# Patient Record
Sex: FEMALE | Race: OTHER | NOT HISPANIC OR LATINO | URBAN - METROPOLITAN AREA
[De-identification: names, ages, dates, MRNs, and addresses within clinical notes are randomized per-mention and may not be internally consistent; named-entity substitution may affect disease eponyms.]

---

## 2021-06-06 ENCOUNTER — INPATIENT (INPATIENT)
Facility: HOSPITAL | Age: 71
LOS: 16 days | Discharge: NURSING, REHAB OR LONG-TERM CARE | DRG: 872 | End: 2021-06-23
Attending: INTERNAL MEDICINE | Admitting: INTERNAL MEDICINE
Payer: MEDICARE

## 2021-06-06 VITALS
OXYGEN SATURATION: 97 % | WEIGHT: 293 LBS | HEART RATE: 94 BPM | HEIGHT: 65 IN | RESPIRATION RATE: 20 BRPM | TEMPERATURE: 100 F

## 2021-06-06 LAB
ALBUMIN SERPL ELPH-MCNC: 4.1 G/DL — SIGNIFICANT CHANGE UP (ref 3.3–5)
ALP SERPL-CCNC: 77 U/L — SIGNIFICANT CHANGE UP (ref 40–120)
ALT FLD-CCNC: 15 U/L — SIGNIFICANT CHANGE UP (ref 10–45)
ANION GAP SERPL CALC-SCNC: 13 MMOL/L — SIGNIFICANT CHANGE UP (ref 5–17)
APTT BLD: 31.4 SEC — SIGNIFICANT CHANGE UP (ref 27.5–35.5)
AST SERPL-CCNC: 39 U/L — SIGNIFICANT CHANGE UP (ref 10–40)
BASE EXCESS BLDV CALC-SCNC: 1.4 MMOL/L — SIGNIFICANT CHANGE UP (ref -2–2)
BASOPHILS # BLD AUTO: 0 K/UL — SIGNIFICANT CHANGE UP (ref 0–0.2)
BASOPHILS NFR BLD AUTO: 0 % — SIGNIFICANT CHANGE UP (ref 0–2)
BILIRUB SERPL-MCNC: 0.6 MG/DL — SIGNIFICANT CHANGE UP (ref 0.2–1.2)
BUN SERPL-MCNC: 14 MG/DL — SIGNIFICANT CHANGE UP (ref 7–23)
CA-I SERPL-SCNC: 1.1 MMOL/L — LOW (ref 1.12–1.3)
CALCIUM SERPL-MCNC: 9.4 MG/DL — SIGNIFICANT CHANGE UP (ref 8.4–10.5)
CHLORIDE BLDV-SCNC: 103 MMOL/L — SIGNIFICANT CHANGE UP (ref 96–108)
CHLORIDE SERPL-SCNC: 99 MMOL/L — SIGNIFICANT CHANGE UP (ref 96–108)
CO2 BLDV-SCNC: 27 MMOL/L — SIGNIFICANT CHANGE UP (ref 22–30)
CO2 SERPL-SCNC: 20 MMOL/L — LOW (ref 22–31)
CREAT SERPL-MCNC: 0.65 MG/DL — SIGNIFICANT CHANGE UP (ref 0.5–1.3)
EOSINOPHIL # BLD AUTO: 0 K/UL — SIGNIFICANT CHANGE UP (ref 0–0.5)
EOSINOPHIL NFR BLD AUTO: 0 % — SIGNIFICANT CHANGE UP (ref 0–6)
GAS PNL BLDV: 132 MMOL/L — LOW (ref 135–145)
GAS PNL BLDV: SIGNIFICANT CHANGE UP
GAS PNL BLDV: SIGNIFICANT CHANGE UP
GLUCOSE BLDV-MCNC: 145 MG/DL — HIGH (ref 70–99)
GLUCOSE SERPL-MCNC: 144 MG/DL — HIGH (ref 70–99)
HCO3 BLDV-SCNC: 26 MMOL/L — SIGNIFICANT CHANGE UP (ref 21–29)
HCT VFR BLD CALC: 40.1 % — SIGNIFICANT CHANGE UP (ref 34.5–45)
HCT VFR BLDA CALC: 41 % — SIGNIFICANT CHANGE UP (ref 39–50)
HGB BLD CALC-MCNC: 13.4 G/DL — SIGNIFICANT CHANGE UP (ref 11.5–15.5)
HGB BLD-MCNC: 12.7 G/DL — SIGNIFICANT CHANGE UP (ref 11.5–15.5)
INR BLD: 1.29 RATIO — HIGH (ref 0.88–1.16)
LACTATE BLDV-MCNC: 1.5 MMOL/L — SIGNIFICANT CHANGE UP (ref 0.7–2)
LYMPHOCYTES # BLD AUTO: 0 % — LOW (ref 13–44)
LYMPHOCYTES # BLD AUTO: 0 K/UL — LOW (ref 1–3.3)
MANUAL SMEAR VERIFICATION: SIGNIFICANT CHANGE UP
MCHC RBC-ENTMCNC: 28.6 PG — SIGNIFICANT CHANGE UP (ref 27–34)
MCHC RBC-ENTMCNC: 31.7 GM/DL — LOW (ref 32–36)
MCV RBC AUTO: 90.3 FL — SIGNIFICANT CHANGE UP (ref 80–100)
MONOCYTES # BLD AUTO: 1.83 K/UL — HIGH (ref 0–0.9)
MONOCYTES NFR BLD AUTO: 8.4 % — SIGNIFICANT CHANGE UP (ref 2–14)
NEUTROPHILS # BLD AUTO: 19.44 K/UL — HIGH (ref 1.8–7.4)
NEUTROPHILS NFR BLD AUTO: 89.1 % — HIGH (ref 43–77)
NRBC # BLD: 2 /100 — HIGH (ref 0–0)
PCO2 BLDV: 41 MMHG — SIGNIFICANT CHANGE UP (ref 35–50)
PH BLDV: 7.41 — SIGNIFICANT CHANGE UP (ref 7.35–7.45)
PLAT MORPH BLD: NORMAL — SIGNIFICANT CHANGE UP
PLATELET # BLD AUTO: 216 K/UL — SIGNIFICANT CHANGE UP (ref 150–400)
PO2 BLDV: 50 MMHG — HIGH (ref 25–45)
POTASSIUM BLDV-SCNC: 4.2 MMOL/L — SIGNIFICANT CHANGE UP (ref 3.5–5.3)
POTASSIUM SERPL-MCNC: 6.6 MMOL/L — CRITICAL HIGH (ref 3.5–5.3)
POTASSIUM SERPL-SCNC: 6.6 MMOL/L — CRITICAL HIGH (ref 3.5–5.3)
PROT SERPL-MCNC: 7.9 G/DL — SIGNIFICANT CHANGE UP (ref 6–8.3)
PROTHROM AB SERPL-ACNC: 15.3 SEC — HIGH (ref 10.6–13.6)
RBC # BLD: 4.44 M/UL — SIGNIFICANT CHANGE UP (ref 3.8–5.2)
RBC # FLD: 13.7 % — SIGNIFICANT CHANGE UP (ref 10.3–14.5)
RBC BLD AUTO: NORMAL — SIGNIFICANT CHANGE UP
SAO2 % BLDV: 86 % — SIGNIFICANT CHANGE UP (ref 67–88)
SODIUM SERPL-SCNC: 132 MMOL/L — LOW (ref 135–145)
VARIANT LYMPHS # BLD: 2.5 % — SIGNIFICANT CHANGE UP (ref 0–6)
WBC # BLD: 21.82 K/UL — HIGH (ref 3.8–10.5)
WBC # FLD AUTO: 21.82 K/UL — HIGH (ref 3.8–10.5)

## 2021-06-06 PROCEDURE — 73562 X-RAY EXAM OF KNEE 3: CPT | Mod: 26,LT

## 2021-06-06 PROCEDURE — 99284 EMERGENCY DEPT VISIT MOD MDM: CPT

## 2021-06-06 PROCEDURE — 71045 X-RAY EXAM CHEST 1 VIEW: CPT | Mod: 26

## 2021-06-06 RX ORDER — ACETAMINOPHEN 500 MG
975 TABLET ORAL ONCE
Refills: 0 | Status: COMPLETED | OUTPATIENT
Start: 2021-06-06 | End: 2021-06-06

## 2021-06-06 RX ORDER — SODIUM CHLORIDE 9 MG/ML
500 INJECTION INTRAMUSCULAR; INTRAVENOUS; SUBCUTANEOUS ONCE
Refills: 0 | Status: COMPLETED | OUTPATIENT
Start: 2021-06-06 | End: 2021-06-06

## 2021-06-06 RX ORDER — MORPHINE SULFATE 50 MG/1
4 CAPSULE, EXTENDED RELEASE ORAL ONCE
Refills: 0 | Status: DISCONTINUED | OUTPATIENT
Start: 2021-06-06 | End: 2021-06-06

## 2021-06-06 RX ADMIN — SODIUM CHLORIDE 500 MILLILITER(S): 9 INJECTION INTRAMUSCULAR; INTRAVENOUS; SUBCUTANEOUS at 23:13

## 2021-06-06 RX ADMIN — Medication 975 MILLIGRAM(S): at 23:12

## 2021-06-06 RX ADMIN — MORPHINE SULFATE 4 MILLIGRAM(S): 50 CAPSULE, EXTENDED RELEASE ORAL at 23:28

## 2021-06-06 NOTE — ED ADULT TRIAGE NOTE - BMI (KG/M2)
74.9
Constitutional: No fever, chills, unintended weight loss.  Eyes:  No visual changes, eye pain or discharge.  ENMT:  No hearing changes, pain, no sore throat or runny nose, no difficulty swallowing  Cardiac:  No chest pain, SOB or edema. No chest pain with exertion.  Respiratory:  No cough or respiratory distress. No hemoptysis. No history of asthma or RAD.  GI:  see hpi  : chronic indwelling us  MS:  No myalgia, muscle weakness, joint pain or back pain.  Neuro:  No headache or weakness.  No LOC.  Skin:  No skin rash.   Endocrine: +dm

## 2021-06-06 NOTE — ED PROVIDER NOTE - CLINICAL SUMMARY MEDICAL DECISION MAKING FREE TEXT BOX
70yo female lymphedema p/w 2 days L knee pain and fever. no warmth, TTP or focal swelling of L knee. Febrile here without other symptoms to suggest focal source, skin rash of LE's unchanged from baseline. Unlikely septic joint. Low suspicion for cellulitis. Possible viral syndrome such as covid. Knee pain possible 2/2 fx. XR's, labs, cultures, fluids and reassess. If unable to ambulate will need admission.

## 2021-06-06 NOTE — ED PROVIDER NOTE - PROGRESS NOTE DETAILS
Rosalina PGY2: XR reviewed, concern for distal femur fx and possible dislocation, strong dopplerable DP pulses of L foot, ortho paged Rosalina PGY2: discussed with radiology and ortho, XR findings consistent with old distal femur fx with OA and chronic remodeling. Will admit for inability to ambulate

## 2021-06-06 NOTE — ED PROVIDER NOTE - OBJECTIVE STATEMENT
72yo female b/l lymphedema, morbid obesity, HTN, hypothyrodism, ambulates with walker p/w 2 days L knee pain preventing her from walking today. Noted fever last night that resolved with tylenol. 1st dose moderna on 5/26, awaiting 2nd dose. Denies chills, abdominal pain, N/V/D, skin changes from baseline, cough, sick contacts, trauma/falls, prior knee surgeries, h/o DVT/PE.

## 2021-06-06 NOTE — ED PROVIDER NOTE - PHYSICAL EXAMINATION
Physical Exam:  Gen: NAD, AOx3, non-toxic appearing  Head: NCAT  HEENT: EOMI, PEERLA, normal conjunctiva, tongue midline, oral mucosa moist  Lung: CTAB, no respiratory distress, no wheezes/rhonchi/rales B/L, speaking in full sentences  CV: RRR, no murmurs, rubs or gallops, distal pulses 2+ b/l  Abd: soft, NT, ND, no guarding, no rigidity, no rebound tenderness, no CVA tenderness   MSK: no visible deformities, no warmth or erythema around L knee. no back TTP  Skin: Warm, well perfused, mild LE rash around ankles and shin's b/l (chronic per patient), significantly lymphedema b/l  Psych: normal affect, calm Physical Exam:  Gen: NAD, AOx3, non-toxic appearing  Head: NCAT  HEENT: EOMI, PEERLA, normal conjunctiva, tongue midline, oral mucosa moist  Lung: CTAB, no respiratory distress, no wheezes/rhonchi/rales B/L, speaking in full sentences  CV: RRR, no murmurs, rubs or gallops, distal pulses 2+ b/l  Abd: soft, NT, ND, no guarding, no rigidity, no rebound tenderness, no CVA tenderness   MSK: no visible deformities, no warmth, TTP or erythema around L knee, no hip TTP, no back TTP  Skin: Warm, well perfused, mild LE rash around ankles and shin's b/l (chronic per patient), significantly lymphedema b/l  Psych: normal affect, calm

## 2021-06-06 NOTE — ED ADULT TRIAGE NOTE - CHIEF COMPLAINT QUOTE
left leg pain, recent hospitalization of cellulitis to b/l legs  unable to obtain BP at triage d/t pt being in rigid moving sled, unable to remove arm from sled on EMS stretcher safely.

## 2021-06-06 NOTE — ED PROVIDER NOTE - ATTENDING CONTRIBUTION TO CARE
72yo female lymphedema p/w 2 days L knee pain and fever. no warmth, TTP or focal swelling of L knee, exam very limited by morbid obesity, will check labs, imaging, likely tba as patient will not be able to manage in outpatient setting. HILTON Mcintyre

## 2021-06-07 DIAGNOSIS — M25.562 PAIN IN LEFT KNEE: ICD-10-CM

## 2021-06-07 LAB
ANION GAP SERPL CALC-SCNC: 13 MMOL/L — SIGNIFICANT CHANGE UP (ref 5–17)
ANION GAP SERPL CALC-SCNC: 14 MMOL/L — SIGNIFICANT CHANGE UP (ref 5–17)
APPEARANCE UR: CLEAR — SIGNIFICANT CHANGE UP
APTT BLD: 33.5 SEC — SIGNIFICANT CHANGE UP (ref 27.5–35.5)
BACTERIA # UR AUTO: NEGATIVE — SIGNIFICANT CHANGE UP
BILIRUB UR-MCNC: NEGATIVE — SIGNIFICANT CHANGE UP
BLD GP AB SCN SERPL QL: NEGATIVE — SIGNIFICANT CHANGE UP
BLD GP AB SCN SERPL QL: NEGATIVE — SIGNIFICANT CHANGE UP
BUN SERPL-MCNC: 11 MG/DL — SIGNIFICANT CHANGE UP (ref 7–23)
BUN SERPL-MCNC: 13 MG/DL — SIGNIFICANT CHANGE UP (ref 7–23)
CALCIUM SERPL-MCNC: 9.2 MG/DL — SIGNIFICANT CHANGE UP (ref 8.4–10.5)
CALCIUM SERPL-MCNC: 9.2 MG/DL — SIGNIFICANT CHANGE UP (ref 8.4–10.5)
CHLORIDE SERPL-SCNC: 101 MMOL/L — SIGNIFICANT CHANGE UP (ref 96–108)
CHLORIDE SERPL-SCNC: 97 MMOL/L — SIGNIFICANT CHANGE UP (ref 96–108)
CK SERPL-CCNC: 26 U/L — SIGNIFICANT CHANGE UP (ref 25–170)
CO2 SERPL-SCNC: 22 MMOL/L — SIGNIFICANT CHANGE UP (ref 22–31)
CO2 SERPL-SCNC: 23 MMOL/L — SIGNIFICANT CHANGE UP (ref 22–31)
COLOR SPEC: SIGNIFICANT CHANGE UP
CREAT SERPL-MCNC: 0.65 MG/DL — SIGNIFICANT CHANGE UP (ref 0.5–1.3)
CREAT SERPL-MCNC: 0.72 MG/DL — SIGNIFICANT CHANGE UP (ref 0.5–1.3)
CRP SERPL-MCNC: 161 MG/L — HIGH (ref 0–4)
DIFF PNL FLD: NEGATIVE — SIGNIFICANT CHANGE UP
EPI CELLS # UR: 1 /HPF — SIGNIFICANT CHANGE UP
ERYTHROCYTE [SEDIMENTATION RATE] IN BLOOD: 61 MM/HR — HIGH (ref 0–20)
GLUCOSE SERPL-MCNC: 113 MG/DL — HIGH (ref 70–99)
GLUCOSE SERPL-MCNC: 141 MG/DL — HIGH (ref 70–99)
GLUCOSE UR QL: NEGATIVE — SIGNIFICANT CHANGE UP
GRAM STN FLD: SIGNIFICANT CHANGE UP
HCT VFR BLD CALC: 38.7 % — SIGNIFICANT CHANGE UP (ref 34.5–45)
HGB BLD-MCNC: 12.3 G/DL — SIGNIFICANT CHANGE UP (ref 11.5–15.5)
HYALINE CASTS # UR AUTO: 1 /LPF — SIGNIFICANT CHANGE UP (ref 0–2)
INR BLD: 1.32 RATIO — HIGH (ref 0.88–1.16)
KETONES UR-MCNC: ABNORMAL
LEUKOCYTE ESTERASE UR-ACNC: ABNORMAL
MCHC RBC-ENTMCNC: 29.1 PG — SIGNIFICANT CHANGE UP (ref 27–34)
MCHC RBC-ENTMCNC: 31.8 GM/DL — LOW (ref 32–36)
MCV RBC AUTO: 91.7 FL — SIGNIFICANT CHANGE UP (ref 80–100)
METHOD TYPE: SIGNIFICANT CHANGE UP
MSSA DNA SPEC QL NAA+PROBE: SIGNIFICANT CHANGE UP
NITRITE UR-MCNC: NEGATIVE — SIGNIFICANT CHANGE UP
NRBC # BLD: 0 /100 WBCS — SIGNIFICANT CHANGE UP (ref 0–0)
PH UR: 6.5 — SIGNIFICANT CHANGE UP (ref 5–8)
PLATELET # BLD AUTO: 211 K/UL — SIGNIFICANT CHANGE UP (ref 150–400)
POTASSIUM SERPL-MCNC: 3.8 MMOL/L — SIGNIFICANT CHANGE UP (ref 3.5–5.3)
POTASSIUM SERPL-MCNC: 4.5 MMOL/L — SIGNIFICANT CHANGE UP (ref 3.5–5.3)
POTASSIUM SERPL-SCNC: 3.8 MMOL/L — SIGNIFICANT CHANGE UP (ref 3.5–5.3)
POTASSIUM SERPL-SCNC: 4.5 MMOL/L — SIGNIFICANT CHANGE UP (ref 3.5–5.3)
PROT UR-MCNC: SIGNIFICANT CHANGE UP
PROTHROM AB SERPL-ACNC: 15.6 SEC — HIGH (ref 10.6–13.6)
RAPID RVP RESULT: SIGNIFICANT CHANGE UP
RBC # BLD: 4.22 M/UL — SIGNIFICANT CHANGE UP (ref 3.8–5.2)
RBC # FLD: 13.9 % — SIGNIFICANT CHANGE UP (ref 10.3–14.5)
RBC CASTS # UR COMP ASSIST: 4 /HPF — SIGNIFICANT CHANGE UP (ref 0–4)
RH IG SCN BLD-IMP: POSITIVE — SIGNIFICANT CHANGE UP
RH IG SCN BLD-IMP: POSITIVE — SIGNIFICANT CHANGE UP
SARS-COV-2 RNA SPEC QL NAA+PROBE: SIGNIFICANT CHANGE UP
SARS-COV-2 RNA SPEC QL NAA+PROBE: SIGNIFICANT CHANGE UP
SODIUM SERPL-SCNC: 134 MMOL/L — LOW (ref 135–145)
SODIUM SERPL-SCNC: 136 MMOL/L — SIGNIFICANT CHANGE UP (ref 135–145)
SP GR SPEC: 1.01 — SIGNIFICANT CHANGE UP (ref 1.01–1.02)
SPECIMEN SOURCE: SIGNIFICANT CHANGE UP
SPECIMEN SOURCE: SIGNIFICANT CHANGE UP
UROBILINOGEN FLD QL: NEGATIVE — SIGNIFICANT CHANGE UP
WBC # BLD: 22.58 K/UL — HIGH (ref 3.8–10.5)
WBC # FLD AUTO: 22.58 K/UL — HIGH (ref 3.8–10.5)
WBC UR QL: 3 /HPF — SIGNIFICANT CHANGE UP (ref 0–5)

## 2021-06-07 RX ORDER — MORPHINE SULFATE 50 MG/1
4 CAPSULE, EXTENDED RELEASE ORAL ONCE
Refills: 0 | Status: DISCONTINUED | OUTPATIENT
Start: 2021-06-07 | End: 2021-06-07

## 2021-06-07 RX ORDER — ACETAMINOPHEN 500 MG
1000 TABLET ORAL ONCE
Refills: 0 | Status: COMPLETED | OUTPATIENT
Start: 2021-06-07 | End: 2021-06-07

## 2021-06-07 RX ORDER — MEROPENEM 1 G/30ML
1000 INJECTION INTRAVENOUS EVERY 8 HOURS
Refills: 0 | Status: DISCONTINUED | OUTPATIENT
Start: 2021-06-07 | End: 2021-06-08

## 2021-06-07 RX ORDER — VANCOMYCIN HCL 1 G
1000 VIAL (EA) INTRAVENOUS ONCE
Refills: 0 | Status: COMPLETED | OUTPATIENT
Start: 2021-06-07 | End: 2021-06-07

## 2021-06-07 RX ORDER — SODIUM CHLORIDE 9 MG/ML
1000 INJECTION, SOLUTION INTRAVENOUS ONCE
Refills: 0 | Status: COMPLETED | OUTPATIENT
Start: 2021-06-07 | End: 2021-06-07

## 2021-06-07 RX ORDER — MORPHINE SULFATE 50 MG/1
2 CAPSULE, EXTENDED RELEASE ORAL ONCE
Refills: 0 | Status: DISCONTINUED | OUTPATIENT
Start: 2021-06-07 | End: 2021-06-07

## 2021-06-07 RX ORDER — ACETAMINOPHEN 500 MG
650 TABLET ORAL EVERY 6 HOURS
Refills: 0 | Status: DISCONTINUED | OUTPATIENT
Start: 2021-06-07 | End: 2021-06-23

## 2021-06-07 RX ADMIN — MEROPENEM 100 MILLIGRAM(S): 1 INJECTION INTRAVENOUS at 09:00

## 2021-06-07 RX ADMIN — MORPHINE SULFATE 4 MILLIGRAM(S): 50 CAPSULE, EXTENDED RELEASE ORAL at 16:39

## 2021-06-07 RX ADMIN — Medication 975 MILLIGRAM(S): at 05:20

## 2021-06-07 RX ADMIN — MORPHINE SULFATE 4 MILLIGRAM(S): 50 CAPSULE, EXTENDED RELEASE ORAL at 03:20

## 2021-06-07 RX ADMIN — MORPHINE SULFATE 4 MILLIGRAM(S): 50 CAPSULE, EXTENDED RELEASE ORAL at 19:33

## 2021-06-07 RX ADMIN — Medication 400 MILLIGRAM(S): at 05:38

## 2021-06-07 RX ADMIN — MORPHINE SULFATE 4 MILLIGRAM(S): 50 CAPSULE, EXTENDED RELEASE ORAL at 12:45

## 2021-06-07 RX ADMIN — SODIUM CHLORIDE 500 MILLILITER(S): 9 INJECTION INTRAMUSCULAR; INTRAVENOUS; SUBCUTANEOUS at 00:00

## 2021-06-07 RX ADMIN — Medication 250 MILLIGRAM(S): at 03:20

## 2021-06-07 RX ADMIN — MORPHINE SULFATE 4 MILLIGRAM(S): 50 CAPSULE, EXTENDED RELEASE ORAL at 12:28

## 2021-06-07 RX ADMIN — MORPHINE SULFATE 2 MILLIGRAM(S): 50 CAPSULE, EXTENDED RELEASE ORAL at 09:00

## 2021-06-07 RX ADMIN — MORPHINE SULFATE 4 MILLIGRAM(S): 50 CAPSULE, EXTENDED RELEASE ORAL at 05:20

## 2021-06-07 RX ADMIN — Medication 400 MILLIGRAM(S): at 18:38

## 2021-06-07 RX ADMIN — MEROPENEM 100 MILLIGRAM(S): 1 INJECTION INTRAVENOUS at 01:51

## 2021-06-07 RX ADMIN — MORPHINE SULFATE 2 MILLIGRAM(S): 50 CAPSULE, EXTENDED RELEASE ORAL at 09:15

## 2021-06-07 NOTE — CONSULT NOTE ADULT - SUBJECTIVE AND OBJECTIVE BOX
----------------------------------------------------------  Interventional Radiology Brief Consult Note  -----------------------------------------------------------    Reason for Referral:   Left knee aspiration     Clinical Summary: 71y Female with pmh of morbid obesity, chronic left distal femur fracture managed nonoperatively from many years ago, now presents with acute onset of atraumatic left knee pain. Subjective fevers at home. Unable to ambulate, although minimal ambulator at baseline. Elevated white count upon admission without clear etiology, orthopedics consulted for concerns of a septic joint. Unable to do bedside aspiration due to body habitus. IR is consulted for left knee aspiration.     Vitals:  T(C): 37.1 (06-07-21 @ 08:20), Max: 38.3 (06-07-21 @ 07:11)  HR: 87 (06-07-21 @ 08:20) (76 - 108)  BP: 123/57 (06-07-21 @ 08:20) (92/75 - 159/70)  RR: 20 (06-07-21 @ 08:20) (14 - 20)  SpO2: 95% (06-07-21 @ 08:20) (95% - 100%)    Labs:           12.7  21.82)-----(216     (06-06-21 @ 22:58)         40.1     136 | 101 | 13  --------------------< 141     (06-07-21 @ 01:33)  4.5 | 22 | 0.72       PT: 15.3<H> 06-06-21 @ 23:42  aPTT: 31.4 06-06-21 @ 23:42   INR: 1.29<H> 06-06-21 @ 23:42      Assessment: 71y Female with left knee pain. pending MRI. IR is consulted for left knee aspiration.     Recommendations:  - IR to follow MRI results. Potentially plan for procedure tomorrow pending MRI results from today. May put IR procedure order under Dr. Allred.   - NPO at midnight. Morning coags, cbc and bmp. Hold anticoagulation.   - Please call IR back with MRI results.        ----------------------------------------------------------  Interventional Radiology Brief Consult Note  -----------------------------------------------------------    Reason for Referral:   Left knee aspiration     Clinical Summary: 71y Female with pmh of morbid obesity, chronic left distal femur fracture managed nonoperatively from many years ago, now presents with acute onset of atraumatic left knee pain. Subjective fevers at home. Unable to ambulate, although minimal ambulator at baseline. Elevated white count upon admission without clear etiology, orthopedics consulted for concerns of a septic joint. Unable to do bedside aspiration due to body habitus. IR is consulted for left knee aspiration.     Vitals:  T(C): 37.1 (06-07-21 @ 08:20), Max: 38.3 (06-07-21 @ 07:11)  HR: 87 (06-07-21 @ 08:20) (76 - 108)  BP: 123/57 (06-07-21 @ 08:20) (92/75 - 159/70)  RR: 20 (06-07-21 @ 08:20) (14 - 20)  SpO2: 95% (06-07-21 @ 08:20) (95% - 100%)    Labs:           12.7  21.82)-----(216     (06-06-21 @ 22:58)         40.1     136 | 101 | 13  --------------------< 141     (06-07-21 @ 01:33)  4.5 | 22 | 0.72       PT: 15.3<H> 06-06-21 @ 23:42  aPTT: 31.4 06-06-21 @ 23:42   INR: 1.29<H> 06-06-21 @ 23:42      Assessment: 71y Female with left knee pain. pending MRI. IR is consulted for left knee aspiration.     Recommendations:  - IR to follow MRI results. Potentially plan for procedure tomorrow pending MRI results from today. May put IR procedure order under Dr. Allred.   - NPO at midnight. Morning coags, cbc and bmp. Hold anticoagulation.   - Please call IR back with MRI results.

## 2021-06-07 NOTE — CONSULT NOTE ADULT - SUBJECTIVE AND OBJECTIVE BOX
71yFemale, morbid obesity, chronic left distal femur fracture managed nonoperatively from many years ago, now presents with acute onset of atraumatic left knee pain. Subjective fevers at home. Unable to ambulate, although minimal ambulator at baseline. Elevated white count upon admission without clear etiology, orthopedics consulted for concerns of a septic joint. Patient denies falls/trauma. Denies numbness/tingling/weakness. No other orthopedic concerns.    PAST MEDICAL & SURGICAL HISTORY:  Lymphedema    HTN (hypertension)    Hypothyroidism    Morbid obesity    No significant past surgical history      Home Medications:    Allergies    No Known Allergies    Intolerances      Vital Signs Last 24 Hrs  T(C): 37.1 (07 Jun 2021 04:47), Max: 38 (06 Jun 2021 22:23)  T(F): 98.7 (07 Jun 2021 04:47), Max: 100.4 (06 Jun 2021 22:23)  HR: 76 (07 Jun 2021 06:00) (76 - 108)  BP: 111/40 (07 Jun 2021 06:00) (92/75 - 159/70)  BP(mean): 54 (07 Jun 2021 06:00) (54 - 98)  RR: 20 (07 Jun 2021 06:00) (16 - 20)  SpO2: 96% (07 Jun 2021 06:00) (95% - 100%)    PE  Gen: NAD, resting comfortably, morbid obesity  LLE:  Exam limited by body habitus, difficult to appreciate any bony anatomy  Skin intact, no erythema or drainage, lower extremity cellulitic changes  TTP over knee  ROM limited by body habitus, poor ROM at baseline, pain with micromotion  +TA/EHL/FHL/GS  SILT L2-S1  + pulses  Compartments soft and compressible      Radiology  XR L Knee: Chronic L DF Fracture, poorly visualized anatomy 2/2 deformity, Severe OA    A/P 71yFemale with atraumatic left knee pain concern for septic joint    - Medical admission  - Difficult examination 2' body habitus, however given elevated ESR/CRP, negative remaining w/u and severe knee pain compared c/l, would have moderate concern for infection  - Needs an MRI of the knee to evaluate for an effusion  - Would recommend IR aspiration pending effusion, body habitus to large for bedside aspiration. Please send cell count, crystals, gram stain, culture  - Would ideally hold abx for possible IR aspiration  - Multimodal analgesia  - WBAT  - PT/OT  - Care per primary team  - Manny attending

## 2021-06-07 NOTE — ED ADULT NURSE REASSESSMENT NOTE - NS ED NURSE REASSESS COMMENT FT1
BETTINA Gleason called at 0530 as pt requesting pain medications. NP ordered Ofirmev for the pt. Pt repositioned in bed and has no complaints at this time. Pt updated on POC and is awaiting a bed upstairs. Pt comfort measures in place, call bell is at bedside, and pt safety maintained.

## 2021-06-07 NOTE — ED ADULT NURSE NOTE - OBJECTIVE STATEMENT
Pt is a 71y Female c/o L knee pain and inability to ambulate x 2 days. Pt sent in by PMD due to pt inability to ambulate in her house with walker at home. Pt is a 71y Female c/o L knee pain and inability to ambulate with her walker x 2 days. Pt PMHx Lymphedema, Morbid Obesity, HTN, Hypothyroidism. Pt sent in by PMD due to pt inability to ambulate in her house with walker at home. Pt states she has 2x 24/7 aides at home who assist her with daily living. Pt states that 2 days ago she was going to he bathroom and felt a pop in her L knee and has since been unable to walk. Pt states her pain has not been well managed at home with Tylenol. Pt also noted a fever last night. Pt denies CP, NVD, abdominal pain, sick contacts, falls, trauma to site, SOB. Pt was brought in on 2L NC but pt O2 sat 95% upon arrival on RA. Pt transferred to hospital bariatric bed with transport mat and placed on AirTap. Pt prefers to go by Shantelle and uses She/Her/Hers pronouns. Pt resting comfortably in bed with MD, EMS, RN at bedside. Pt educated on call bell use and call bell placed at bedside. Pt safety maintained.

## 2021-06-07 NOTE — H&P ADULT - HISTORY OF PRESENT ILLNESS
70yo female b/l lymphedema, morbid obesity, HTN, hypothyrodism, ambulates with walker p/w 2 days L knee pain preventing her from walking today. 72yo female b/l lymphedema, morbid obesity, HTN, hypothyrodism, ambulates with walker p/w 2 days L knee pain preventing her from walking today.  No hx trauma/  fall/ fevers.

## 2021-06-07 NOTE — ED ADULT NURSE REASSESSMENT NOTE - NS ED NURSE REASSESS COMMENT FT1
Received report from Deondre BRITTON. pt. A&Ox3, sitting up in stretcher at this time, pt. does not appear to be in any acute distress - nonlabored breathing noted. Pt. repositioned for comfort. Pt. oral temp of 101 at this time, as per night shift RN pt. received IV Tylenol at 0538 and MD is aware, pt is being admitted for fever and l knee pain. Safety and comfort provided.

## 2021-06-07 NOTE — ED ADULT NURSE NOTE - INTERVENTIONS DEFINITIONS
[place] to call system/Call bell, personal items and telephone within reach/Instruct patient to call for assistance/Physically safe environment: no spills, clutter or unnecessary equipment/Provide visual cue, wrist band, yellow gown, etc./Reinforce activity limits and safety measures with patient and family/Provide visual clues: red socks

## 2021-06-07 NOTE — H&P ADULT - ASSESSMENT
70yo female b/l lymphedema, morbid obesity, HTN, hypothyrodism, ambulates with walker p/w 2 days L knee pain     Left knee pain- Reviewed imaging. Ortho consult appreciated.   Recommend MRI Left knee r/o septic arthritis     Hypothyroidism Continue with Synthroid     HTN

## 2021-06-07 NOTE — CHART NOTE - NSCHARTNOTEFT_GEN_A_CORE
Medicine Progress Note    cc fever and + BC    Patient is a 71y old  Female who presents with a chief complaint of Left knee pain today (2021 11:01) concerning for septic joint  arrived from ED with T102 and + BC gram positive cocci      SUBJECTIVE / OVERNIGHT EVENTS:    ADDITIONAL REVIEW OF SYSTEMS:    MEDICATIONS  (STANDING):  meropenem  IVPB 1000 milliGRAM(s) IV Intermittent every 8 hours  vancomycin  IVPB 1000 milliGRAM(s) IV Intermittent once    MEDICATIONS  (PRN):  acetaminophen   Tablet .. 650 milliGRAM(s) Oral every 6 hours PRN Temp greater or equal to 38C (100.4F)    CAPILLARY BLOOD GLUCOSE        I&O's Summary      PHYSICAL EXAM:  Vital Signs Last 24 Hrs  T(C): 39.2 (2021 17:08), Max: 39.2 (2021 17:08)  T(F): 102.5 (2021 17:08), Max: 102.5 (2021 17:08)  HR: 92 (2021 17:08) (76 - 108)  BP: 117/65 (2021 17:08) (92/75 - 165/85)  BP(mean): 74 (2021 07:11) (54 - 98)  RR: 18 (2021 17:08) (14 - 20)  SpO2: 96% (2021 17:08) (95% - 100%)  CONSTITUTIONAL:   Morbid obesity BMI 74.9  RESPIRATORY: decreased BS, no rales  CARDIOVASCULAR: Regular rate and rhythm,  ABDOMEN: Obese, nontender   Ext- Bilateral lymphedema  PSYCH: A+O to person, place, and time; affect appropriate  SKIN: No rashes; no palpable lesions, no erythema    LABS:                        12.3   22.58 )-----------( 211      ( 2021 11:16 )             38.7     06-07    134<L>  |  97  |  11  ----------------------------<  113<H>  3.8   |  23  |  0.65    Ca    9.2      2021 11:16    TPro  7.9  /  Alb  4.1  /  TBili  0.6  /  DBili  x   /  AST  39  /  ALT  15  /  AlkPhos  77  06-06    PT/INR - ( 2021 11:16 )   PT: 15.6 sec;   INR: 1.32 ratio         PTT - ( 2021 11:16 )  PTT:33.5 sec  CARDIAC MARKERS ( 2021 23:57 )  x     / x     / 26 U/L / x     / x          Urinalysis Basic - ( 2021 01:33 )    Color: Light Yellow / Appearance: Clear / S.012 / pH: x  Gluc: x / Ketone: Small  / Bili: Negative / Urobili: Negative   Blood: x / Protein: Trace / Nitrite: Negative   Leuk Esterase: Small / RBC: 4 /hpf / WBC 3 /HPF   Sq Epi: x / Non Sq Epi: 1 /hpf / Bacteria: Negative        Culture - Blood (collected 2021 00:33)  Source: .Blood Blood-Venous  Gram Stain (2021 18:38):    Growth in aerobic bottle: Gram Positive Cocci in Clusters  Preliminary Report (2021 18:38):    Growth in aerobic bottle: Gram Positive Cocci in Clusters    ***Blood Panel PCR results on this specimen are available    approximately 3 hours after the Gram stain result.***    Gram stain, PCR, and/or culture results may not always    correspond due to difference in methodologies.    ************************************************************    This PCR assay was performed by multiplex PCR. This    Assay tests for 66 bacterial and resistance gene targets.    Please refer to the [facility] test directory    at [email or web address] for details.    SARS-CoV-2: NotDetec (2021 23:59)  COVID-19 PCR: NotDetec (2021 23:42)    RADIOLOGY & ADDITIONAL TESTS: pending MRI left knee  Imaging from Last 24 Hours:    Assessment:  71 year old Female with PMH morbid obesity, BLE lymphedema, HTN, hypothyroidism, ambulates with walker presented to ED with  2 days L knee pain preventing her from walking. On arrival to medical floor, pt noted with T102 and + BC gram positive cocci, Leukocytosis, Sepsis, possible septic joint    Plan:  Sepsis protocol and labs ordered  pending MRI knee then IR drain, ortho following, ID consulted: Give another dose of Vanco IV and continue Meropenum, Tylenol IV for fever/pain. IVF's.  Repeat another two sets of BC x 2 in am       Care Discussed with Consultants/Other Providers: Dr Angulo and ID Dr Vazquez

## 2021-06-08 LAB
ALBUMIN SERPL ELPH-MCNC: 3.6 G/DL — SIGNIFICANT CHANGE UP (ref 3.3–5)
ALBUMIN SERPL ELPH-MCNC: 3.8 G/DL — SIGNIFICANT CHANGE UP (ref 3.3–5)
ALP SERPL-CCNC: 82 U/L — SIGNIFICANT CHANGE UP (ref 40–120)
ALP SERPL-CCNC: 90 U/L — SIGNIFICANT CHANGE UP (ref 40–120)
ALT FLD-CCNC: 11 U/L — SIGNIFICANT CHANGE UP (ref 10–45)
ALT FLD-CCNC: 11 U/L — SIGNIFICANT CHANGE UP (ref 10–45)
ANION GAP SERPL CALC-SCNC: 15 MMOL/L — SIGNIFICANT CHANGE UP (ref 5–17)
ANION GAP SERPL CALC-SCNC: 15 MMOL/L — SIGNIFICANT CHANGE UP (ref 5–17)
APTT BLD: 30.5 SEC — SIGNIFICANT CHANGE UP (ref 27.5–35.5)
APTT BLD: 33.9 SEC — SIGNIFICANT CHANGE UP (ref 27.5–35.5)
AST SERPL-CCNC: 16 U/L — SIGNIFICANT CHANGE UP (ref 10–40)
AST SERPL-CCNC: 21 U/L — SIGNIFICANT CHANGE UP (ref 10–40)
BASOPHILS # BLD AUTO: 0.05 K/UL — SIGNIFICANT CHANGE UP (ref 0–0.2)
BASOPHILS NFR BLD AUTO: 0.2 % — SIGNIFICANT CHANGE UP (ref 0–2)
BILIRUB SERPL-MCNC: 0.7 MG/DL — SIGNIFICANT CHANGE UP (ref 0.2–1.2)
BILIRUB SERPL-MCNC: 0.8 MG/DL — SIGNIFICANT CHANGE UP (ref 0.2–1.2)
BUN SERPL-MCNC: 12 MG/DL — SIGNIFICANT CHANGE UP (ref 7–23)
BUN SERPL-MCNC: 12 MG/DL — SIGNIFICANT CHANGE UP (ref 7–23)
CALCIUM SERPL-MCNC: 9.3 MG/DL — SIGNIFICANT CHANGE UP (ref 8.4–10.5)
CALCIUM SERPL-MCNC: 9.5 MG/DL — SIGNIFICANT CHANGE UP (ref 8.4–10.5)
CHLORIDE SERPL-SCNC: 94 MMOL/L — LOW (ref 96–108)
CHLORIDE SERPL-SCNC: 96 MMOL/L — SIGNIFICANT CHANGE UP (ref 96–108)
CO2 SERPL-SCNC: 20 MMOL/L — LOW (ref 22–31)
CO2 SERPL-SCNC: 21 MMOL/L — LOW (ref 22–31)
COVID-19 SPIKE DOMAIN AB INTERP: POSITIVE
COVID-19 SPIKE DOMAIN ANTIBODY RESULT: 3.9 U/ML — HIGH
CREAT SERPL-MCNC: 0.61 MG/DL — SIGNIFICANT CHANGE UP (ref 0.5–1.3)
CREAT SERPL-MCNC: 0.64 MG/DL — SIGNIFICANT CHANGE UP (ref 0.5–1.3)
CRP SERPL-MCNC: 290 MG/L — HIGH (ref 0–4)
EOSINOPHIL # BLD AUTO: 0 K/UL — SIGNIFICANT CHANGE UP (ref 0–0.5)
EOSINOPHIL NFR BLD AUTO: 0 % — SIGNIFICANT CHANGE UP (ref 0–6)
ERYTHROCYTE [SEDIMENTATION RATE] IN BLOOD: 119 MM/HR — HIGH (ref 0–20)
GLUCOSE SERPL-MCNC: 105 MG/DL — HIGH (ref 70–99)
GLUCOSE SERPL-MCNC: 127 MG/DL — HIGH (ref 70–99)
GRAM STN FLD: SIGNIFICANT CHANGE UP
GRAM STN FLD: SIGNIFICANT CHANGE UP
HCT VFR BLD CALC: 37.9 % — SIGNIFICANT CHANGE UP (ref 34.5–45)
HCT VFR BLD CALC: 38.1 % — SIGNIFICANT CHANGE UP (ref 34.5–45)
HGB BLD-MCNC: 12.3 G/DL — SIGNIFICANT CHANGE UP (ref 11.5–15.5)
HGB BLD-MCNC: 12.4 G/DL — SIGNIFICANT CHANGE UP (ref 11.5–15.5)
IMM GRANULOCYTES NFR BLD AUTO: 1.1 % — SIGNIFICANT CHANGE UP (ref 0–1.5)
INR BLD: 1.34 RATIO — HIGH (ref 0.88–1.16)
INR BLD: 1.4 RATIO — HIGH (ref 0.88–1.16)
LACTATE SERPL-SCNC: 1.2 MMOL/L — SIGNIFICANT CHANGE UP (ref 0.7–2)
LYMPHOCYTES # BLD AUTO: 1.08 K/UL — SIGNIFICANT CHANGE UP (ref 1–3.3)
LYMPHOCYTES # BLD AUTO: 4.4 % — LOW (ref 13–44)
MCHC RBC-ENTMCNC: 29.3 PG — SIGNIFICANT CHANGE UP (ref 27–34)
MCHC RBC-ENTMCNC: 29.9 PG — SIGNIFICANT CHANGE UP (ref 27–34)
MCHC RBC-ENTMCNC: 32.3 GM/DL — SIGNIFICANT CHANGE UP (ref 32–36)
MCHC RBC-ENTMCNC: 32.7 GM/DL — SIGNIFICANT CHANGE UP (ref 32–36)
MCV RBC AUTO: 90.7 FL — SIGNIFICANT CHANGE UP (ref 80–100)
MCV RBC AUTO: 91.3 FL — SIGNIFICANT CHANGE UP (ref 80–100)
MONOCYTES # BLD AUTO: 2.66 K/UL — HIGH (ref 0–0.9)
MONOCYTES NFR BLD AUTO: 10.8 % — SIGNIFICANT CHANGE UP (ref 2–14)
MRSA PCR RESULT.: SIGNIFICANT CHANGE UP
NEUTROPHILS # BLD AUTO: 20.56 K/UL — HIGH (ref 1.8–7.4)
NEUTROPHILS NFR BLD AUTO: 83.5 % — HIGH (ref 43–77)
NRBC # BLD: 0 /100 WBCS — SIGNIFICANT CHANGE UP (ref 0–0)
NRBC # BLD: 0 /100 WBCS — SIGNIFICANT CHANGE UP (ref 0–0)
PLATELET # BLD AUTO: 211 K/UL — SIGNIFICANT CHANGE UP (ref 150–400)
PLATELET # BLD AUTO: 229 K/UL — SIGNIFICANT CHANGE UP (ref 150–400)
POTASSIUM SERPL-MCNC: 4.4 MMOL/L — SIGNIFICANT CHANGE UP (ref 3.5–5.3)
POTASSIUM SERPL-MCNC: 4.8 MMOL/L — SIGNIFICANT CHANGE UP (ref 3.5–5.3)
POTASSIUM SERPL-SCNC: 4.4 MMOL/L — SIGNIFICANT CHANGE UP (ref 3.5–5.3)
POTASSIUM SERPL-SCNC: 4.8 MMOL/L — SIGNIFICANT CHANGE UP (ref 3.5–5.3)
PROCALCITONIN SERPL-MCNC: 0.43 NG/ML — HIGH (ref 0.02–0.1)
PROT SERPL-MCNC: 7.6 G/DL — SIGNIFICANT CHANGE UP (ref 6–8.3)
PROT SERPL-MCNC: 7.6 G/DL — SIGNIFICANT CHANGE UP (ref 6–8.3)
PROTHROM AB SERPL-ACNC: 15.9 SEC — HIGH (ref 10.6–13.6)
PROTHROM AB SERPL-ACNC: 16.5 SEC — HIGH (ref 10.6–13.6)
RBC # BLD: 4.15 M/UL — SIGNIFICANT CHANGE UP (ref 3.8–5.2)
RBC # BLD: 4.2 M/UL — SIGNIFICANT CHANGE UP (ref 3.8–5.2)
RBC # FLD: 13.7 % — SIGNIFICANT CHANGE UP (ref 10.3–14.5)
RBC # FLD: 13.7 % — SIGNIFICANT CHANGE UP (ref 10.3–14.5)
S AUREUS DNA NOSE QL NAA+PROBE: SIGNIFICANT CHANGE UP
SARS-COV-2 IGG+IGM SERPL QL IA: 3.9 U/ML — HIGH
SARS-COV-2 IGG+IGM SERPL QL IA: POSITIVE
SODIUM SERPL-SCNC: 130 MMOL/L — LOW (ref 135–145)
SODIUM SERPL-SCNC: 131 MMOL/L — LOW (ref 135–145)
SPECIMEN SOURCE: SIGNIFICANT CHANGE UP
VANCOMYCIN TROUGH SERPL-MCNC: 4.5 UG/ML — LOW (ref 10–20)
WBC # BLD: 24.62 K/UL — HIGH (ref 3.8–10.5)
WBC # BLD: 26.96 K/UL — HIGH (ref 3.8–10.5)
WBC # FLD AUTO: 24.62 K/UL — HIGH (ref 3.8–10.5)
WBC # FLD AUTO: 26.96 K/UL — HIGH (ref 3.8–10.5)

## 2021-06-08 PROCEDURE — 93306 TTE W/DOPPLER COMPLETE: CPT | Mod: 26

## 2021-06-08 PROCEDURE — 20611 DRAIN/INJ JOINT/BURSA W/US: CPT | Mod: LT

## 2021-06-08 PROCEDURE — 99222 1ST HOSP IP/OBS MODERATE 55: CPT

## 2021-06-08 PROCEDURE — 73701 CT LOWER EXTREMITY W/DYE: CPT | Mod: 26,LT

## 2021-06-08 RX ORDER — DULOXETINE HYDROCHLORIDE 30 MG/1
60 CAPSULE, DELAYED RELEASE ORAL DAILY
Refills: 0 | Status: DISCONTINUED | OUTPATIENT
Start: 2021-06-08 | End: 2021-06-23

## 2021-06-08 RX ORDER — LEVOTHYROXINE SODIUM 125 MCG
25 TABLET ORAL DAILY
Refills: 0 | Status: DISCONTINUED | OUTPATIENT
Start: 2021-06-08 | End: 2021-06-23

## 2021-06-08 RX ORDER — CEFAZOLIN SODIUM 1 G
2000 VIAL (EA) INJECTION ONCE
Refills: 0 | Status: COMPLETED | OUTPATIENT
Start: 2021-06-08 | End: 2021-06-08

## 2021-06-08 RX ORDER — HEPARIN SODIUM 5000 [USP'U]/ML
5000 INJECTION INTRAVENOUS; SUBCUTANEOUS EVERY 8 HOURS
Refills: 0 | Status: COMPLETED | OUTPATIENT
Start: 2021-06-08 | End: 2021-06-08

## 2021-06-08 RX ORDER — MORPHINE SULFATE 50 MG/1
2 CAPSULE, EXTENDED RELEASE ORAL EVERY 6 HOURS
Refills: 0 | Status: DISCONTINUED | OUTPATIENT
Start: 2021-06-08 | End: 2021-06-09

## 2021-06-08 RX ORDER — ATORVASTATIN CALCIUM 80 MG/1
10 TABLET, FILM COATED ORAL AT BEDTIME
Refills: 0 | Status: DISCONTINUED | OUTPATIENT
Start: 2021-06-08 | End: 2021-06-23

## 2021-06-08 RX ORDER — CEFAZOLIN SODIUM 1 G
VIAL (EA) INJECTION
Refills: 0 | Status: DISCONTINUED | OUTPATIENT
Start: 2021-06-08 | End: 2021-06-09

## 2021-06-08 RX ORDER — MORPHINE SULFATE 50 MG/1
2 CAPSULE, EXTENDED RELEASE ORAL ONCE
Refills: 0 | Status: DISCONTINUED | OUTPATIENT
Start: 2021-06-08 | End: 2021-06-08

## 2021-06-08 RX ORDER — CEFAZOLIN SODIUM 1 G
2000 VIAL (EA) INJECTION EVERY 8 HOURS
Refills: 0 | Status: DISCONTINUED | OUTPATIENT
Start: 2021-06-08 | End: 2021-06-09

## 2021-06-08 RX ADMIN — MORPHINE SULFATE 2 MILLIGRAM(S): 50 CAPSULE, EXTENDED RELEASE ORAL at 16:03

## 2021-06-08 RX ADMIN — MEROPENEM 100 MILLIGRAM(S): 1 INJECTION INTRAVENOUS at 08:34

## 2021-06-08 RX ADMIN — ATORVASTATIN CALCIUM 10 MILLIGRAM(S): 80 TABLET, FILM COATED ORAL at 22:14

## 2021-06-08 RX ADMIN — Medication 250 MILLIGRAM(S): at 00:07

## 2021-06-08 RX ADMIN — MORPHINE SULFATE 2 MILLIGRAM(S): 50 CAPSULE, EXTENDED RELEASE ORAL at 16:30

## 2021-06-08 RX ADMIN — Medication 100 MILLIGRAM(S): at 22:14

## 2021-06-08 RX ADMIN — MORPHINE SULFATE 2 MILLIGRAM(S): 50 CAPSULE, EXTENDED RELEASE ORAL at 02:39

## 2021-06-08 RX ADMIN — MORPHINE SULFATE 2 MILLIGRAM(S): 50 CAPSULE, EXTENDED RELEASE ORAL at 23:54

## 2021-06-08 RX ADMIN — MORPHINE SULFATE 2 MILLIGRAM(S): 50 CAPSULE, EXTENDED RELEASE ORAL at 09:00

## 2021-06-08 RX ADMIN — MORPHINE SULFATE 2 MILLIGRAM(S): 50 CAPSULE, EXTENDED RELEASE ORAL at 08:35

## 2021-06-08 RX ADMIN — SODIUM CHLORIDE 1000 MILLILITER(S): 9 INJECTION, SOLUTION INTRAVENOUS at 00:06

## 2021-06-08 RX ADMIN — Medication 100 MILLIGRAM(S): at 17:17

## 2021-06-08 RX ADMIN — MORPHINE SULFATE 2 MILLIGRAM(S): 50 CAPSULE, EXTENDED RELEASE ORAL at 22:15

## 2021-06-08 RX ADMIN — HEPARIN SODIUM 5000 UNIT(S): 5000 INJECTION INTRAVENOUS; SUBCUTANEOUS at 22:22

## 2021-06-08 RX ADMIN — MORPHINE SULFATE 2 MILLIGRAM(S): 50 CAPSULE, EXTENDED RELEASE ORAL at 04:44

## 2021-06-08 RX ADMIN — MEROPENEM 100 MILLIGRAM(S): 1 INJECTION INTRAVENOUS at 01:48

## 2021-06-08 RX ADMIN — Medication 100 MILLIGRAM(S): at 09:58

## 2021-06-08 NOTE — CHART NOTE - NSCHARTNOTEFT_GEN_A_CORE
Awaiting IR aspiration results.    Patient can resume diet for now.  Please keep NPO after midnight tonight.  Re-preop'd for 6/9, possible OR (pending IR aspiration results).

## 2021-06-08 NOTE — ADVANCED PRACTICE NURSE CONSULT - ASSESSMENT
The pt was encountered on 4DSU- she is in a Bariatric support surface. The Mobility team was at the bedside to assist with turning the pt for skin assessment- primary RN at bedside as well.  Staff have applied a Prima-fit catheter to divert urine from the skin. Pt reports comfort with this.  Pt reports that she ambulates at home with a walker but was unable to do so of late because of the left knee pain. She reports " I haven't been out of my house in 5 years."  Pt has lymphedema but denies having any treatment such as massage or wearing compression garments. Unable to palpate the pedal pulses due to swelling.  Ms Jaramillo was hesitant to turn but did so with encouragement,  Upon assessment she presents with the following:  Left lateral leg near the ankle was an area of purple discoloration measuring 16cm x 14cm x0cm- the skin is intact- question if this may be a deep tissue injury. A similar area was noted on the RLE.  Proximal to this area on the left lateral thigh is an intact deep purple bulla measuring 6cm x 4cm x0cm - it is intact at present- question if this may also be a deep tissue injury.  On the sacrum and b/l buttocks was an area of deep purple discoloration measuring 16cm x 14cm x0cm. The skin is intact with surrounding blanchable erythema. Will classify as a deep tissue injury as pt has been unable to move and walk at home for several days and the damage may further present itself.   Pericare was provided and Cavilon barrier film applied.   The pt was turned using a device from home- will recommend changing to the Air-tap.  The pt is morbidly obese with a BMI of 74.9. A nutrition consult is pending for further evaluation.  Education was provided to the pt regarding the condition of her skin and the tx used- education was provided regarding pressure injury prevention.

## 2021-06-08 NOTE — CONSULT NOTE ADULT - SUBJECTIVE AND OBJECTIVE BOX
Patient is a 71y old  Female who presents with a chief complaint of Left knee pain today (08 Jun 2021 13:23)      HPI:  72yo female b/l lymphedema, morbid obesity, HTN, hypothyrodism, ambulates with walker p/w 2 days L knee pain preventing her from walking today.  No hx trauma/  fall/ fevers.   Above reviewed:   Pt says she felt fine until 2-3 days ago, when all of a sudden she could not get up to go to the bathroom with severe pain in the knee. She usually uses a walker at baseline. + fever and chills at home initial day then only low grade temp. She tends to get cellulitis every 3 months but this time she has not had cellulitis for almost 9 months. No other injuries, cuts or boils or pustules. She is able to bend her rt knee but unable to move the lt knee.       PAST MEDICAL & SURGICAL HISTORY:  Lymphedema    HTN (hypertension)    Hypothyroidism    Morbid obesity    No significant past surgical history        REVIEW OF SYSTEMS    General: + Fevers     Skin: No rash  	  Ophthalmologic: Denies any discharge, redness.  	  ENT: No nasal congestion or throat pain.     Respiratory and Thorax: No cough, sputum. Denies shortness of breath.  	  Cardiovascular: No chest pain,     Gastrointestinal: No nausea, abdominal pain or diarrhea.    Genitourinary: No dysuria, frequency.    Musculoskeletal: per HPI     Neurological: No extremity weakness.    Psychiatric: No hallucinations	    Extremities: chronic lymphedema     Endocrine: No polyuria or polydipsia     Allergic/Immunologic: No hives         Social history:  lives alone, denies smoking       FAMILY HISTORY:  denies any family hx of DM in first degree relatives.         Allergies  No Known Allergies      Antimicrobials:  ceFAZolin   IVPB 2000 milliGRAM(s) IV Intermittent every 8 hours        Vital Signs Last 24 Hrs  T(C): 37.6 (08 Jun 2021 12:24), Max: 39.2 (07 Jun 2021 17:08)  T(F): 99.6 (08 Jun 2021 12:24), Max: 102.5 (07 Jun 2021 17:08)  HR: 95 (08 Jun 2021 12:24) (91 - 95)  BP: 147/64 (08 Jun 2021 12:24) (117/65 - 151/73)  BP(mean): --  RR: 18 (08 Jun 2021 12:24) (18 - 18)  SpO2: 94% (08 Jun 2021 12:24) (92% - 96%)      PHYSICAL EXAM: Patient in no acute distress.    Constitutional: Comfortable. Awake and alert, thirsty     Eyes: No discharge or conjunctival injection    ENT: No thrush. No pharyngeal exudate     Neck: Supple,     Respiratory: Good air entry bilaterally.    Cardiovascular: S1 S2 wnl, tachy    Gastrointestinal: Soft BS(+) no tenderness, non distended.    Genitourinary: No CVA tenderness     Extremities: + lymphedema. chronic skin changes with erythema, not much increased warmth of leg     Vascular: peripheral pulses felt    Neurological: AAO X 3. No grossly focal deficits.    Skin: No rash     Musculoskeletal: lt knee ROM minimal due pain. rt knee able to move without any pain.     Psychiatric: Affect normal.                              12.3   24.62 )-----------( 229      ( 08 Jun 2021 10:48 )             38.1       06-08    130<L>  |  94<L>  |  12  ----------------------------<  105<H>  4.4   |  21<L>  |  0.61    Ca    9.3      08 Jun 2021 10:52    TPro  7.6  /  Alb  3.8  /  TBili  0.7  /  DBili  x   /  AST  16  /  ALT  11  /  AlkPhos  90  06-08        Culture - Urine (collected 07 Jun 2021 06:27)  Source: .Urine Clean Catch (Midstream)  Preliminary Report (08 Jun 2021 10:52):    10,000 - 49,000 CFU/mL Pseudomonas aeruginosa    <10,000 CFU/ml Normal Urogenital armen present    Culture - Blood (collected 07 Jun 2021 00:33)  Source: .Blood Blood-Venous  Gram Stain (08 Jun 2021 00:49):    Growth in aerobic bottle: Gram Positive Cocci in Clusters    Growth in anaerobic bottle: Gram Positive Cocci in Clusters  Preliminary Report (08 Jun 2021 15:28):    Growth in aerobic and anaerobic bottles: Staphylococcus aureus    ***Blood Panel PCR results on this specimen are available    approximately 3 hours after the Gram stain result.***    Gram stain, PCR, and/or culture results may not always    correspond dueto difference in methodologies.    ************************************************************    This PCR assay was performed by multiplex PCR. This    Assay tests for 66 bacterial and resistance gene targets.    Please refer to the [facility] test directory    at [email or web address] for details.  Organism: Blood Culture PCR (07 Jun 2021 20:38)  Organism: Blood Culture PCR (07 Jun 2021 20:38)    Culture - Blood (collected 07 Jun 2021 00:33)  Source: .Blood Blood  Gram Stain (07 Jun 2021 22:14):    Growth in aerobic bottle: Gram Positive Cocci in Clusters    Growth in anaerobic bottle: Gram Positive Cocci in Clusters  Preliminary Report (08 Jun 2021 15:32):    Growth in aerobic and anaerobic bottles: Staphylococcus aureus    See previous culture [id number]        Radiology: Imaging reviewed and visualized personally [ x]    < from: Xray Knee 3 Views, Left (06.06.21 @ 23:33) >  IMPRESSION:  1. Chronic appearing deformity of the left knee with external rotation ofthe lower leg and chronic appearing lateral dislocation of the patella.  2. Severe associated osteoarthritis is present with osseous remodeling and osteophytes.  3. Correlation with prior imaging is suggested if available. CT may be helpful for further evaluation if warranted.  4. This represents a change in interpretation from the preliminary report. This was communicated electronically by [employer] to the emergency room providers as per departmental protocol.      < from: Xray Chest 1 View- PORTABLE-Urgent (Xray Chest 1 View- PORTABLE-Urgent .) (06.06.21 @ 23:33) >  IMPRESSION:    Clear lungs.      < from: CT Knee w/ IV Cont, Left (06.08.21 @ 11:07) >    IMPRESSION:    Severe tricompartmental arthrosis of the knee. Subchondral cystic change and nonspecific erosions along the lateral tibiofemoral compartment. Small knee joint effusion without synovial enhancement to suggest synovitis.    Nonspecific synovitis about the knee, most prominent medially. No peripherally enhancing fluid collection to suggest abscess. No subcutaneous air.

## 2021-06-08 NOTE — CHART NOTE - NSCHARTNOTEFT_GEN_A_CORE
Plan for US guided aspiration of the L knee today. Procedure order under Dr. Allred. Plan for US guided aspiration of the L knee today for diagnostic purposes. Procedure order under Dr. Allred.

## 2021-06-08 NOTE — PROGRESS NOTE ADULT - ASSESSMENT
72yo female b/l lymphedema, morbid obesity, HTN, hypothyrodism, ambulates with walker p/w 2 days L knee pain     Left knee pain- Reviewed imaging. Ortho consult appreciated.   Recommend MRI Left knee   s/p IR aspiration of Left Knee today, follow up.    Hypothyroidism Continue with Synthroid     HTN Continue with Lisinopril. Hold Norvasc.

## 2021-06-08 NOTE — ADVANCED PRACTICE NURSE CONSULT - RECOMMEDATIONS
Will recommend the followin. b/l thighs: apply Cavilon daily  2. B/l buttocks: Advanced Cavilon --, routine pericare between applications.  3. Continue to monitor for changes.  4. Continue to encourage mobility  5. complete CAir boots for off-loading  6. Continue with Prima-Fit  7. nutrition support as pt condition allows  8. consider Air-Tap for turning and positioning  Tx plan discussed with RN

## 2021-06-08 NOTE — ADVANCED PRACTICE NURSE CONSULT - REASON FOR CONSULT
Requested by staff to assess skin status of pt a/w multiple pressure injuries. PMH is noted:  70yo female b/l lymphedema, morbid obesity, HTN, hypothyrodism, ambulates with walker p/w 2 days L knee pain preventing her from walking today.  No hx trauma/  fall/ fevers.

## 2021-06-08 NOTE — PROGRESS NOTE ADULT - SUBJECTIVE AND OBJECTIVE BOX
Patient is a 71y old  Female who presents with a chief complaint of Left knee pain today (08 Jun 2021 17:01)      SUBJECTIVE / OVERNIGHT EVENTS: no events over night       T(C): 37.6 (06-08-21 @ 12:24), Max: 37.6 (06-08-21 @ 12:15)  HR: 95 (06-08-21 @ 12:24) (95 - 95)  BP: 147/64 (06-08-21 @ 12:24) (147/64 - 147/64)  RR: 18 (06-08-21 @ 12:24) (18 - 18)  SpO2: 94% (06-08-21 @ 12:24) (94% - 94%)      MEDICATIONS  (STANDING):  ceFAZolin   IVPB      ceFAZolin   IVPB 2000 milliGRAM(s) IV Intermittent every 8 hours    MEDICATIONS  (PRN):  acetaminophen   Tablet .. 650 milliGRAM(s) Oral every 6 hours PRN Temp greater or equal to 38C (100.4F)  morphine  - Injectable 2 milliGRAM(s) IV Push every 6 hours PRN Severe Pain (7 - 10)        PHYSICAL EXAM:  GENERAL: NAD, well-developed  HEAD:  Atraumatic, Normocephalic  EYES: EOMI, PERRLA, conjunctiva and sclera clear  NECK: Supple, No JVD  CHEST/LUNG: Clear to auscultation bilaterally; No wheeze  HEART: Regular rate and rhythm; No murmurs, rubs, or gallops  ABDOMEN: Soft, Nontender, Nondistended; Bowel sounds present  EXTREMITIES:  Left knee swollen, erythematous   2+ Peripheral Pulses, No clubbing, cyanosis, or edema  PSYCH: AAOx3  NEUROLOGY: non-focal  SKIN: No rashes or lesions                           12.3   24.62 )-----------( 229      ( 08 Jun 2021 10:48 )             38.1       CARDIAC MARKERS ( 06 Jun 2021 23:57 )  x     / x     / 26 U/L / x     / x          LIVER FUNCTIONS - ( 08 Jun 2021 10:52 )  Alb: 3.8 g/dL / Pro: 7.6 g/dL / ALK PHOS: 90 U/L / ALT: 11 U/L / AST: 16 U/L / GGT: x           PT/INR - ( 08 Jun 2021 10:52 )   PT: 15.9 sec;   INR: 1.34 ratio         PTT - ( 08 Jun 2021 10:52 )  PTT:33.9 sec  130|94|12<105  4.4|21|0.61  9.3,--,--  06-08 @ 10:52  131|96|12<127  4.8|20|0.64  9.5,--,--  06-08 @ 01:44      RADIOLOGY & ADDITIONAL TESTS:    Imaging Personally Reviewed:    Consultant(s) Notes Reviewed:      Care Discussed with Consultants/Other Providers:

## 2021-06-08 NOTE — CONSULT NOTE ADULT - SUBJECTIVE AND OBJECTIVE BOX
HISTORY OF PRESENT ILLNESS: HPI:  Patient is a 72 y/o female with PMH of b/l lymphedema, morbid obesity, HTN, and hypothyroidism who presented with L knee pain and inability to walk concerning for septic joint and found to have staph aureus bacteremia. Cardiology consulted for preop clearance. Patient c/o left knee pain. Denies prior cardiac history such as CAD/MI/CHF. Reports normal stress test many years ago. Denies chest pain, SOB, palpitations, dizziness, or syncope.     PAST MEDICAL & SURGICAL HISTORY:  Lymphedema    HTN (hypertension)    Hypothyroidism    Morbid obesity    No significant past surgical history      MEDICATIONS:  MEDICATIONS  (STANDING):  ceFAZolin   IVPB      ceFAZolin   IVPB 2000 milliGRAM(s) IV Intermittent every 8 hours      Allergies    No Known Allergies    Intolerances        FAMILY HISTORY:    Non-contributary for premature coronary disease or sudden cardiac death    SOCIAL HISTORY:    [ x] Non-smoker  [ ] Smoker  [ ] Alcohol    FLU VACCINE THIS YEAR STARTS IN AUGUST:  [ ] Yes    [ ] No    IF OVER 65 HAVE YOU EVER HAD A PNA VACCINE:  [ ] Yes    [ ] No       [ ] N/A      REVIEW OF SYSTEMS:  [ ]chest pain  [  ]shortness of breath  [  ]palpitations  [  ]syncope  [ ]near syncope [ ]upper extremity weakness   [ ] lower extremity weakness  [  ]diplopia  [  ]altered mental status   [  ]fevers  [ ]chills [ ]nausea  [ ]vomitting  [  ]dysphagia    [ ]abdominal pain  [ ]melena  [ ]BRBPR    [  ]epistaxis  [  ]rash    [x ]lower extremity edema    +L knee pain    [x ] All others negative	  [ ] Unable to obtain      LABS:	 	    CARDIAC MARKERS:  CARDIAC MARKERS ( 06 Jun 2021 23:57 )  x     / x     / 26 U/L / x     / x                                  12.3   24.62 )-----------( 229      ( 08 Jun 2021 10:48 )             38.1     Hb Trend: 12.3<--, 12.4<--    06-08    130<L>  |  94<L>  |  12  ----------------------------<  105<H>  4.4   |  21<L>  |  0.61    Ca    9.3      08 Jun 2021 10:52    TPro  7.6  /  Alb  3.8  /  TBili  0.7  /  DBili  x   /  AST  16  /  ALT  11  /  AlkPhos  90  06-08    Creatinine Trend: 0.61<--, 0.64<--, 0.65<--, 0.72<--, 0.65<--    Coags:  PT/INR - ( 08 Jun 2021 10:52 )   PT: 15.9 sec;   INR: 1.34 ratio         PTT - ( 08 Jun 2021 10:52 )  PTT:33.9 sec    proBNP:   Lipid Profile:   HgA1c:   TSH:         PHYSICAL EXAM:  T(C): 37.6 (06-08-21 @ 12:24), Max: 39.2 (06-07-21 @ 17:08)  HR: 95 (06-08-21 @ 12:24) (91 - 101)  BP: 147/64 (06-08-21 @ 12:24) (117/65 - 165/85)  RR: 18 (06-08-21 @ 12:24) (18 - 20)  SpO2: 94% (06-08-21 @ 12:24) (92% - 97%)  Wt(kg): --   BMI (kg/m2): 74.9 (06-07-21 @ 00:24)  I&O's Summary    07 Jun 2021 07:01  -  08 Jun 2021 07:00  --------------------------------------------------------  IN: 0 mL / OUT: 100 mL / NET: -100 mL      Gen: Appears well in NAD  HEENT:  (-)icterus (-)pallor  CV: N S1 S2 1/6 DIETER (+)2 Pulses B/l  Resp:  Clear to auscultation B/L, normal effort  GI: (+) BS Soft, NT, ND  Lymph:  (+) B/L chronic LE Edema, (-)obvious lymphadenopathy  Skin: Warm to touch, Normal turgor  Psych: Appropriate mood and affect    TELEMETRY: None	      ECG: NSR, low voltage QRS 	    RADIOLOGY:         CXR: Clear lungs    ASSESSMENT/PLAN: Patient is a 72 y/o female with PMH of b/l lymphedema, morbid obesity, HTN, and hypothyroidism who presented with L knee pain and inability to walk concerning for septic joint and found to have staph aureus bacteremia. Cardiology consulted for preop clearance.     - No evidence of clinical HF or anginal symptoms  - Ortho/IR f/u for L knee aspiration  - Abx per ID  - Check TTE to eval LV function and valvular abnormalities given gram positive bacteremia (may need to consider DENIS if high clinical suspicion for endocarditis pending ID recs)  - Further recs pending above    Paras Perez PA-C  Pager: [phone]   HISTORY OF PRESENT ILLNESS: HPI:  Patient is a 70 y/o female with PMH of b/l lymphedema, morbid obesity, HTN, and hypothyroidism who presented with L knee pain and inability to walk concerning for septic joint and found to have staph aureus bacteremia. Cardiology consulted for preop clearance. Patient c/o left knee pain. Denies prior cardiac history such as CAD/MI/CHF. Reports normal stress test many years ago. Denies chest pain, SOB, palpitations, dizziness, or syncope.     PAST MEDICAL & SURGICAL HISTORY:  Lymphedema    HTN (hypertension)    Hypothyroidism    Morbid obesity    No significant past surgical history      MEDICATIONS:  MEDICATIONS  (STANDING):  ceFAZolin   IVPB      ceFAZolin   IVPB 2000 milliGRAM(s) IV Intermittent every 8 hours      Allergies    No Known Allergies    Intolerances        FAMILY HISTORY:    Non-contributary for premature coronary disease or sudden cardiac death    SOCIAL HISTORY:    [ x] Non-smoker  [ ] Smoker  [ ] Alcohol    FLU VACCINE THIS YEAR STARTS IN AUGUST:  [ ] Yes    [ ] No    IF OVER 65 HAVE YOU EVER HAD A PNA VACCINE:  [ ] Yes    [ ] No       [ ] N/A      REVIEW OF SYSTEMS:  [ ]chest pain  [  ]shortness of breath  [  ]palpitations  [  ]syncope  [ ]near syncope [ ]upper extremity weakness   [ ] lower extremity weakness  [  ]diplopia  [  ]altered mental status   [  ]fevers  [ ]chills [ ]nausea  [ ]vomitting  [  ]dysphagia    [ ]abdominal pain  [ ]melena  [ ]BRBPR    [  ]epistaxis  [  ]rash    [x ]lower extremity edema    +L knee pain    [x ] All others negative	  [ ] Unable to obtain      LABS:	 	    CARDIAC MARKERS:  CARDIAC MARKERS ( 06 Jun 2021 23:57 )  x     / x     / 26 U/L / x     / x                                  12.3   24.62 )-----------( 229      ( 08 Jun 2021 10:48 )             38.1     Hb Trend: 12.3<--, 12.4<--    06-08    130<L>  |  94<L>  |  12  ----------------------------<  105<H>  4.4   |  21<L>  |  0.61    Ca    9.3      08 Jun 2021 10:52    TPro  7.6  /  Alb  3.8  /  TBili  0.7  /  DBili  x   /  AST  16  /  ALT  11  /  AlkPhos  90  06-08    Creatinine Trend: 0.61<--, 0.64<--, 0.65<--, 0.72<--, 0.65<--    Coags:  PT/INR - ( 08 Jun 2021 10:52 )   PT: 15.9 sec;   INR: 1.34 ratio         PTT - ( 08 Jun 2021 10:52 )  PTT:33.9 sec    proBNP:   Lipid Profile:   HgA1c:   TSH:         PHYSICAL EXAM:  T(C): 37.6 (06-08-21 @ 12:24), Max: 39.2 (06-07-21 @ 17:08)  HR: 95 (06-08-21 @ 12:24) (91 - 101)  BP: 147/64 (06-08-21 @ 12:24) (117/65 - 165/85)  RR: 18 (06-08-21 @ 12:24) (18 - 20)  SpO2: 94% (06-08-21 @ 12:24) (92% - 97%)  Wt(kg): --   BMI (kg/m2): 74.9 (06-07-21 @ 00:24)  I&O's Summary    07 Jun 2021 07:01  -  08 Jun 2021 07:00  --------------------------------------------------------  IN: 0 mL / OUT: 100 mL / NET: -100 mL      Gen: Appears well in NAD  HEENT:  (-)icterus (-)pallor  CV: N S1 S2 1/6 DIETER (+)2 Pulses B/l  Resp:  Clear to auscultation B/L, normal effort  GI: (+) BS Soft, NT, ND  Lymph:  (+) B/L chronic LE Edema, (-)obvious lymphadenopathy  Skin: Warm to touch, Normal turgor  Psych: Appropriate mood and affect    TELEMETRY: None	      ECG: NSR, low voltage QRS 	    RADIOLOGY:         CXR: Clear lungs    ASSESSMENT/PLAN: Patient is a 70 y/o female with PMH of b/l lymphedema, morbid obesity, HTN, and hypothyroidism who presented with L knee pain and inability to walk concerning for septic joint and found to have staph aureus bacteremia. Cardiology consulted for preop clearance.     - No evidence of clinical HF, anginal symptoms, or unstable cardiac syndromes  - Ortho/IR f/u for L knee aspiration  - Abx per ID  - Would Check baseline TTE to eval LV/RV function and valvular abnormalities given gram positive bacteremia (may need to consider DENIS if high clinical suspicion for endocarditis pending ID recs)  - Based on patient's current RCRI score, would consider her low cardiac risk.  - However, If planned for urgent non elective ortho procedure, would not delay procedure for further cardiac testing such as TTE. Would have anesthesia eval regarding airway if OR planned given morbid obesity    Paras Perez PA-C  Pager: [phone]

## 2021-06-08 NOTE — PRE PROCEDURE NOTE - PRE PROCEDURE EVALUATION
Interventional Radiology Pre Procedure Note    HPI: 71y Female with left knee pain. Left knee joint aspiration is requested.     Allergies:   Medications (Abx/Cardiac/Anticoagulation/Blood Products)    ceFAZolin   IVPB: 100 mL/Hr IV Intermittent (06-08 @ 09:58)  meropenem  IVPB: 100 mL/Hr IV Intermittent (06-08 @ 08:34)  vancomycin  IVPB: 250 mL/Hr IV Intermittent (06-08 @ 00:07)  vancomycin  IVPB: 250 mL/Hr IV Intermittent (06-07 @ 03:20)    Data:    T(C): 37.6  HR: 95  BP: 147/64  RR: 18  SpO2: 94%    Exam  General: No acute distress  Chest: Non labored breathing    -WBC 24.62 / HgB 12.3 / Hct 38.1 / Plt 229  -Na 130 / Cl 94 / BUN 12 / Glucose 105  -K 4.4 / CO2 21 / Cr 0.61  -ALT 11 / Alk Phos 90 / T.Bili 0.7  -INR1.34    Plan: 71y Female presents for left knee joint aspiration. Procedure and risks discussed with patient and she is agreeable to proceed.   -Risks/Benefits/alternatives explained with the patient and/or healthcare proxy and witnessed informed consent obtained.

## 2021-06-09 LAB
-  AMIKACIN: SIGNIFICANT CHANGE UP
-  AMPICILLIN/SULBACTAM: SIGNIFICANT CHANGE UP
-  AZTREONAM: SIGNIFICANT CHANGE UP
-  CEFAZOLIN: SIGNIFICANT CHANGE UP
-  CEFEPIME: SIGNIFICANT CHANGE UP
-  CEFTAZIDIME: SIGNIFICANT CHANGE UP
-  CIPROFLOXACIN: SIGNIFICANT CHANGE UP
-  CLINDAMYCIN: SIGNIFICANT CHANGE UP
-  ERYTHROMYCIN: SIGNIFICANT CHANGE UP
-  GENTAMICIN: SIGNIFICANT CHANGE UP
-  GENTAMICIN: SIGNIFICANT CHANGE UP
-  IMIPENEM: SIGNIFICANT CHANGE UP
-  LEVOFLOXACIN: SIGNIFICANT CHANGE UP
-  MEROPENEM: SIGNIFICANT CHANGE UP
-  OXACILLIN: SIGNIFICANT CHANGE UP
-  PENICILLIN: SIGNIFICANT CHANGE UP
-  PIPERACILLIN/TAZOBACTAM: SIGNIFICANT CHANGE UP
-  RIFAMPIN: SIGNIFICANT CHANGE UP
-  TETRACYCLINE: SIGNIFICANT CHANGE UP
-  TOBRAMYCIN: SIGNIFICANT CHANGE UP
-  TRIMETHOPRIM/SULFAMETHOXAZOLE: SIGNIFICANT CHANGE UP
-  VANCOMYCIN: SIGNIFICANT CHANGE UP
ANION GAP SERPL CALC-SCNC: 15 MMOL/L — SIGNIFICANT CHANGE UP (ref 5–17)
APTT BLD: 33.2 SEC — SIGNIFICANT CHANGE UP (ref 27.5–35.5)
BLD GP AB SCN SERPL QL: NEGATIVE — SIGNIFICANT CHANGE UP
BUN SERPL-MCNC: 12 MG/DL — SIGNIFICANT CHANGE UP (ref 7–23)
CALCIUM SERPL-MCNC: 9.3 MG/DL — SIGNIFICANT CHANGE UP (ref 8.4–10.5)
CHLORIDE SERPL-SCNC: 96 MMOL/L — SIGNIFICANT CHANGE UP (ref 96–108)
CO2 SERPL-SCNC: 23 MMOL/L — SIGNIFICANT CHANGE UP (ref 22–31)
CREAT SERPL-MCNC: 0.58 MG/DL — SIGNIFICANT CHANGE UP (ref 0.5–1.3)
CULTURE RESULTS: SIGNIFICANT CHANGE UP
GLUCOSE SERPL-MCNC: 99 MG/DL — SIGNIFICANT CHANGE UP (ref 70–99)
HCT VFR BLD CALC: 37.2 % — SIGNIFICANT CHANGE UP (ref 34.5–45)
HGB BLD-MCNC: 11.9 G/DL — SIGNIFICANT CHANGE UP (ref 11.5–15.5)
INR BLD: 1.33 RATIO — HIGH (ref 0.88–1.16)
MCHC RBC-ENTMCNC: 28.7 PG — SIGNIFICANT CHANGE UP (ref 27–34)
MCHC RBC-ENTMCNC: 32 GM/DL — SIGNIFICANT CHANGE UP (ref 32–36)
MCV RBC AUTO: 89.9 FL — SIGNIFICANT CHANGE UP (ref 80–100)
METHOD TYPE: SIGNIFICANT CHANGE UP
METHOD TYPE: SIGNIFICANT CHANGE UP
NRBC # BLD: 0 /100 WBCS — SIGNIFICANT CHANGE UP (ref 0–0)
ORGANISM # SPEC MICROSCOPIC CNT: SIGNIFICANT CHANGE UP
PLATELET # BLD AUTO: 232 K/UL — SIGNIFICANT CHANGE UP (ref 150–400)
POTASSIUM SERPL-MCNC: 4.1 MMOL/L — SIGNIFICANT CHANGE UP (ref 3.5–5.3)
POTASSIUM SERPL-SCNC: 4.1 MMOL/L — SIGNIFICANT CHANGE UP (ref 3.5–5.3)
PROTHROM AB SERPL-ACNC: 15.7 SEC — HIGH (ref 10.6–13.6)
RBC # BLD: 4.14 M/UL — SIGNIFICANT CHANGE UP (ref 3.8–5.2)
RBC # FLD: 13.6 % — SIGNIFICANT CHANGE UP (ref 10.3–14.5)
RH IG SCN BLD-IMP: POSITIVE — SIGNIFICANT CHANGE UP
SODIUM SERPL-SCNC: 134 MMOL/L — LOW (ref 135–145)
SPECIMEN SOURCE: SIGNIFICANT CHANGE UP
WBC # BLD: 23.74 K/UL — HIGH (ref 3.8–10.5)
WBC # FLD AUTO: 23.74 K/UL — HIGH (ref 3.8–10.5)

## 2021-06-09 PROCEDURE — 99233 SBSQ HOSP IP/OBS HIGH 50: CPT | Mod: GC

## 2021-06-09 RX ORDER — HEPARIN SODIUM 5000 [USP'U]/ML
5000 INJECTION INTRAVENOUS; SUBCUTANEOUS EVERY 8 HOURS
Refills: 0 | Status: DISCONTINUED | OUTPATIENT
Start: 2021-06-09 | End: 2021-06-10

## 2021-06-09 RX ORDER — MELOXICAM 15 MG/1
1 TABLET ORAL
Qty: 0 | Refills: 0 | DISCHARGE

## 2021-06-09 RX ORDER — OXYCODONE HYDROCHLORIDE 5 MG/1
10 TABLET ORAL EVERY 4 HOURS
Refills: 0 | Status: DISCONTINUED | OUTPATIENT
Start: 2021-06-09 | End: 2021-06-16

## 2021-06-09 RX ORDER — CEFAZOLIN SODIUM 1 G
2000 VIAL (EA) INJECTION EVERY 6 HOURS
Refills: 0 | Status: DISCONTINUED | OUTPATIENT
Start: 2021-06-09 | End: 2021-06-23

## 2021-06-09 RX ADMIN — OXYCODONE HYDROCHLORIDE 10 MILLIGRAM(S): 5 TABLET ORAL at 16:48

## 2021-06-09 RX ADMIN — ATORVASTATIN CALCIUM 10 MILLIGRAM(S): 80 TABLET, FILM COATED ORAL at 21:48

## 2021-06-09 RX ADMIN — Medication 25 MICROGRAM(S): at 05:12

## 2021-06-09 RX ADMIN — Medication 100 MILLIGRAM(S): at 12:18

## 2021-06-09 RX ADMIN — Medication 100 MILLIGRAM(S): at 23:20

## 2021-06-09 RX ADMIN — OXYCODONE HYDROCHLORIDE 10 MILLIGRAM(S): 5 TABLET ORAL at 21:30

## 2021-06-09 RX ADMIN — Medication 100 MILLIGRAM(S): at 16:56

## 2021-06-09 RX ADMIN — DULOXETINE HYDROCHLORIDE 60 MILLIGRAM(S): 30 CAPSULE, DELAYED RELEASE ORAL at 12:24

## 2021-06-09 RX ADMIN — MORPHINE SULFATE 2 MILLIGRAM(S): 50 CAPSULE, EXTENDED RELEASE ORAL at 11:45

## 2021-06-09 RX ADMIN — HEPARIN SODIUM 5000 UNIT(S): 5000 INJECTION INTRAVENOUS; SUBCUTANEOUS at 21:50

## 2021-06-09 RX ADMIN — MORPHINE SULFATE 2 MILLIGRAM(S): 50 CAPSULE, EXTENDED RELEASE ORAL at 11:13

## 2021-06-09 RX ADMIN — Medication 100 MILLIGRAM(S): at 05:11

## 2021-06-09 RX ADMIN — OXYCODONE HYDROCHLORIDE 10 MILLIGRAM(S): 5 TABLET ORAL at 20:53

## 2021-06-09 RX ADMIN — OXYCODONE HYDROCHLORIDE 10 MILLIGRAM(S): 5 TABLET ORAL at 17:15

## 2021-06-09 NOTE — DIETITIAN INITIAL EVALUATION ADULT. - OTHER INFO
Pt NPO at time of visit. Endorses good appetite, anticipating good PO intake with diet advancement as medically feasible. Pt provided education on importance of maintaining adequate calorie and protein intake in setting of wound healing. Pt receptive to education, all questions answered appropriately. Pt made aware RD remains available.    GI: Pt states that she has been consistently nauseous, however no episodes of emesis and states that she did not ask for anti-emetics. Reports last BM on 6/6; Pt is not currently on bowel regimen.    Current dosing weight: 204.1 Kg (6/7)  Pt unable to recall UBW; endorses weight has been stable. No reports of unintentional weight loss.  -- Of note, Pt with lymphedema and 4+ edema to b/l LE per nursing flowsheets. Degree of total weight may be subject to fluid.

## 2021-06-09 NOTE — DIETITIAN INITIAL EVALUATION ADULT. - PERTINENT MEDS FT
MEDICATIONS  (STANDING):  atorvastatin 10 milliGRAM(s) Oral at bedtime  ceFAZolin   IVPB      ceFAZolin   IVPB 2000 milliGRAM(s) IV Intermittent every 8 hours  DULoxetine 60 milliGRAM(s) Oral daily  levothyroxine 25 MICROGram(s) Oral daily    MEDICATIONS  (PRN):  acetaminophen   Tablet .. 650 milliGRAM(s) Oral every 6 hours PRN Temp greater or equal to 38C (100.4F)  morphine  - Injectable 2 milliGRAM(s) IV Push every 6 hours PRN Severe Pain (7 - 10)

## 2021-06-09 NOTE — PHARMACOTHERAPY INTERVENTION NOTE - COMMENTS
ARI Jaramillo is 70 yo F with most recent weight of 204kg on cefazolin for MSSA bacteremia.  Dr. Sainz asked about appropriate dosing because of patient's weight.  While we do give 3g of cefazolin for periop surgical prophylaxis in those patients >120kg, there is not much literature using those doses as standing.  However, cefazolin can be dosed up to 12g/day.      Suggested 2g q6h with possibility to increase more if blood cultures don't clear.      Marcela Longoria, PharmD, BCIDP  Clinical Pharmacist, Infectious Diseases  Cell: [phone]/Pager: [phone]
Medication reconciliation completed. Please refer to specifics in home medication list (outpatient medication review). Medications verified with patient. Confirmed primary pharmacy is [facility].    Home Medications:  amLODIPine 10 mg oral tablet: 1 tab(s) orally once a day   atorvastatin 10 mg oral tablet: 1 tab(s) orally once a day   benazepril 40 mg oral tablet: 1 tab(s) orally once a day  DULoxetine 60 mg oral delayed release capsule: 1 cap(s) orally once a day   levothyroxine 25 mcg (0.025 mg) oral tablet: 1 tab(s) orally once a day  Tylenol 325 mg oral tablet: 2 tab(s) orally 4 times a day, As Needed   Voltaren 1% topical gel: Apply topically to affected area , As Needed knee pain     Supplements:  Vitamin D3 1000 intl units (25 mcg) oral tablet: 1 tab(s) orally once a day   calcium (as carbonate) 500 mg oral tablet: 1 tab(s) orally once a day (caltrate)   Multiple Vitamins oral tablet: 1 tab(s) orally once a day (centrum)   Probiotic Formula oral capsule: 1 cap(s) orally once a day     Time spent: 20 minutes
70 yo F currently on meropenem for overnight fever and positive blood cultures for gram positive cocci; s/p vanco x1. BCx PCR showing MSSA 4/4. Recommend switching meropenem to cefazolin 2g IV Q8H. Discussed with NP and ID pharmacist.    Malik Alonso, PharmD, BCPS  [phone]

## 2021-06-09 NOTE — DIETITIAN INITIAL EVALUATION ADULT. - CHIEF COMPLAINT
Per chart, "72yo female b/l lymphedema, morbid obesity, HTN, hypothyrodism, ambulates with walker p/w 2 days L knee pain preventing her from walking ... Pt found to have sepsis, fever, leucocytosis, tachycardia, bacteremia, staph aureus infection, high concern for lt knee septic arthritis."

## 2021-06-09 NOTE — PROGRESS NOTE ADULT - ASSESSMENT
72yo female b/l lymphedema, morbid obesity, HTN, hypothyrodism, ambulates with walker p/w 2 days L knee pain     Left knee pain- Reviewed imaging. Ortho consult appreciated.     Recommend MRI Left knee   s/p IR aspiration of Left Knee.    Patient for OR aspiration of Left Knee today.   Patient high risk for intermediate risk surgery.   Continue with Ancef as per ID.   ID following.   TTE shows Endocardial visualization enhanced with intravenous  injection of Ultrasonic Enhancing Agent (Definity), preserved left ventricular ejection fraction.    Hypothyroidism Continue with Synthroid     HTN Continue with Lisinopril. Hold Norvasc.

## 2021-06-09 NOTE — PROGRESS NOTE ADULT - SUBJECTIVE AND OBJECTIVE BOX
S: c/o L knee pain. Denies chest pain or SOB. Review of systems otherwise (-)    Review of Systems:   Constitutional: [ ] fevers, [ ] chills.   Skin: [ ] dry skin. [ ] rashes.  Psychiatric: [ ] depression, [ ] anxiety.   Gastrointestinal: [ ] BRBPR, [ ] melena.   Neurological: [ ] confusion. [ ] seizures. [ ] shuffling gait.   Ears,Nose,Mouth and Throat: [ ] ear pain [ ] sore throat.   Eyes: [ ] diplopia.   Respiratory: [ ] hemoptysis. [ ] shortness of breath  Cardiovascular: See HPI above  Hematologic/Lymphatic: [ ] anemia. [ ] painful nodes. [ ] prolonged bleeding.   Genitourinary: [ ] hematuria. [ ] flank pain.   Endocrine: [ ] significant change in weight. [ ] intolerance to heat and cold.     Review of systems [ x] otherwise negative, [ ] otherwise unable to obtain    FH: no family history of sudden cardiac death in first degree relatives    SH: [ ] tobacco, [ ] alcohol, [ ] drugs    acetaminophen   Tablet .. 650 milliGRAM(s) Oral every 6 hours PRN  atorvastatin 10 milliGRAM(s) Oral at bedtime  ceFAZolin   IVPB 2000 milliGRAM(s) IV Intermittent every 6 hours  DULoxetine 60 milliGRAM(s) Oral daily  levothyroxine 25 MICROGram(s) Oral daily  morphine  - Injectable 2 milliGRAM(s) IV Push every 6 hours PRN  oxyCODONE    IR 10 milliGRAM(s) Oral every 4 hours PRN                            11.9   23.74 )-----------( 232      ( 09 Jun 2021 03:29 )             37.2       06-09    134<L>  |  96  |  12  ----------------------------<  99  4.1   |  23  |  0.58    Ca    9.3      09 Jun 2021 03:29    TPro  7.6  /  Alb  3.8  /  TBili  0.7  /  DBili  x   /  AST  16  /  ALT  11  /  AlkPhos  90  06-08      CARDIAC MARKERS ( 06 Jun 2021 23:57 )  x     / x     / 26 U/L / x     / x            T(C): 37.3 (06-09-21 @ 12:30), Max: 37.3 (06-08-21 @ 21:14)  HR: 73 (06-09-21 @ 12:30) (73 - 95)  BP: 137/61 (06-09-21 @ 12:30) (110/62 - 137/61)  RR: 17 (06-09-21 @ 12:30) (17 - 17)  SpO2: 95% (06-09-21 @ 12:30) (95% - 96%)  Wt(kg): --    I&O's Summary    08 Jun 2021 07:01  -  09 Jun 2021 07:00  --------------------------------------------------------  IN: 0 mL / OUT: 1300 mL / NET: -1300 mL      Gen: Appears well in NAD  HEENT:  (-)icterus (-)pallor  CV: N S1 S2 1/6 DIETER (+)2 Pulses B/l  Resp:  Clear to auscultation B/L, normal effort  GI: (+) BS Soft, NT, ND  Lymph:  (+) B/L chronic LE Edema, (-)obvious lymphadenopathy  Skin: Warm to touch, Normal turgor  Psych: Appropriate mood and affect    TELEMETRY: None	      ECG: NSR, low voltage QRS     < from: TTE with Doppler (w/Cont) (06.08.21 @ 16:27) >  Conclusions:  Technically difficult study-valves not well visualized.  1. Endocardial visualization enhanced with intravenous  injection of Ultrasonic Enhancing Agent (Definity). Overall  preserved left ventricular ejection fraction. Wall motion  evaluation is limited.  2. No Valves visualized. Unable to exclude valvular  vegetations in the setting of a technically difficult  study.  3. DENIS could be obtained for further evaluation of valvular  vegetations, if clinical suspicion of endocarditis is high.    *** Noprevious Echo exam.    < end of copied text >  	    RADIOLOGY:         CXR: Clear lungs    ASSESSMENT/PLAN: Patient is a 72 y/o female with PMH of b/l lymphedema, morbid obesity, HTN, and hypothyroidism who presented with L knee pain and inability to walk concerning for septic joint and found to have staph aureus bacteremia. Cardiology consulted for preop clearance.     - No evidence of clinical HF, anginal symptoms, or unstable cardiac syndromes  - Ortho follow up  - Abx per ID  - TTE noted difficult study, preserved LVEF (may need to consider eventual DENIS if high clinical suspicion for endocarditis pending ID recs)  - Based on patient's current RCRI score, would consider her low cardiac risk. Patient is optimized from CV perspective for ortho procedures.  - No further cardiac testing needed prior to OR  - Would have anesthesia eval regarding airway if OR planned given morbid obesity    Paras Perez PA-C  Pager: [phone]

## 2021-06-09 NOTE — PROGRESS NOTE ADULT - SUBJECTIVE AND OBJECTIVE BOX
Follow Up:  MSSA bacteremia, concern for septic joint    Inverval History/ROS:Patient is a 71y old  Female who presents with a chief complaint of Left knee pain today (09 Jun 2021 14:49)    Pt denies fever, chills, still complaining of significant left knee pain. Unable to move left leg secondary to knee pain. Denies significant point tenderness back pain though does admit to some discomfort in back after her transportation to hospital.    Allergies    No Known Allergies    Intolerances        ANTIMICROBIALS:  ceFAZolin   IVPB 2000 every 6 hours      OTHER MEDS:  acetaminophen   Tablet .. 650 milliGRAM(s) Oral every 6 hours PRN  atorvastatin 10 milliGRAM(s) Oral at bedtime  DULoxetine 60 milliGRAM(s) Oral daily  levothyroxine 25 MICROGram(s) Oral daily  morphine  - Injectable 2 milliGRAM(s) IV Push every 6 hours PRN  oxyCODONE    IR 10 milliGRAM(s) Oral every 4 hours PRN      Vital Signs Last 24 Hrs  T(C): 37.3 (09 Jun 2021 12:30), Max: 37.3 (08 Jun 2021 21:14)  T(F): 99.1 (09 Jun 2021 12:30), Max: 99.2 (08 Jun 2021 21:14)  HR: 73 (09 Jun 2021 12:30) (73 - 95)  BP: 137/61 (09 Jun 2021 12:30) (110/62 - 137/61)  BP(mean): --  RR: 17 (09 Jun 2021 12:30) (17 - 17)  SpO2: 95% (09 Jun 2021 12:30) (95% - 96%)    Physical Exam  Gen: morbidly obese, non-toxic, alert and oriented, uncomfortable  Lungs: CTA B/L, no w/r/r  Heart: RRR, no m/r/g  Abdomen: soft, non-ttp  Ext: b/l lymphedema, overlying chronic venous changes but no cellulitis. Given obesity, LE anatomy difficult to define, cannot clear appreciate joint swelling. ROM LLE extremely limited 2/2 knee pain  Skin: as above                          11.9   23.74 )-----------( 232      ( 09 Jun 2021 03:29 )             37.2       06-09    134<L>  |  96  |  12  ----------------------------<  99  4.1   |  23  |  0.58    Ca    9.3      09 Jun 2021 03:29    TPro  7.6  /  Alb  3.8  /  TBili  0.7  /  DBili  x   /  AST  16  /  ALT  11  /  AlkPhos  90  06-08          MICROBIOLOGY:Culture Results:   No growth to date. (06-08 @ 14:36)  Culture Results:   No growth to date. (06-08 @ 14:36)  Culture Results:   10,000 - 49,000 CFU/mL Pseudomonas aeruginosa  <10,000 CFU/ml Normal Urogenital armen present (06-07 @ 06:27)  Culture Results:   Growth in aerobic and anaerobic bottles: Staphylococcus aureus  See previous culture [id number] (06-07 @ 00:33)  Culture Results:   Growth in aerobic and anaerobic bottles: Staphylococcus aureus  ***Blood Panel PCR results on this specimen are available  approximately 3 hours after the Gram stain result.***  Gram stain, PCR, and/or culture results may not always  correspond dueto difference in methodologies.  ************************************************************  This PCR assay was performed by multiplex PCR. This  Assay tests for 66 bacterial and resistance gene targets.  Please refer to the [facility] test directory  at [email or web address] for details. (06-07 @ 00:33)    Culture - Body Fluid with Gram Stain (06.08.21 @ 22:17)   Gram Stain:   No polymorphonuclear cells seen   No organisms seen   by cytocentrifuge   Specimen Source: .Body Fluid Synovial Fluid Culture - Blood in AM (06.08.21 @ 14:36)   Specimen Source: .Blood Blood   Culture Results:   No growth to date. Culture - Blood (06.07.21 @ 00:33)   Gram Stain:   Growth in aerobic bottle: Gram Positive Cocci in Clusters   Growth in anaerobic bottle: Gram Positive Cocci in Clusters   - Staphylococcus aureus: Detec Any isolate of Staphylococcus aureus from a blood culture is NOT considered a contaminant.     RADIOLOGY:    < from: Xray Knee 3 Views, Left (06.06.21 @ 23:33) >  IMPRESSION:  1. Chronic appearing deformity of the left knee with external rotation ofthe lower leg and chronic appearing lateral dislocation of the patella.  2. Severe associated osteoarthritis is present with osseous remodeling and osteophytes.  3. Correlation with prior imaging is suggested if available. CT may be helpful for further evaluation if warranted.  4. This represents a change in interpretation from the preliminary report. This was communicated electronically by [email or web address] to the emergency room providers as per departmental protocol.      < from: Xray Chest 1 View- PORTABLE-Urgent (Xray Chest 1 View- PORTABLE-Urgent .) (06.06.21 @ 23:33) >  IMPRESSION:    Clear lungs.      < from: CT Knee w/ IV Cont, Left (06.08.21 @ 11:07) >    IMPRESSION:    Severe tricompartmental arthrosis of the knee. Subchondral cystic change and nonspecific erosions along the lateral tibiofemoral compartment. Small knee joint effusion without synovial enhancement to suggest synovitis.    Nonspecific synovitis about the knee, most prominent medially. No peripherally enhancing fluid collection to suggest abscess. No subcutaneous air.           Follow Up:  MSSA bacteremia, concern for septic joint    Inverval History/ROS:Patient is a 71y old  Female who presents with a chief complaint of Left knee pain today (09 Jun 2021 14:49)    Pt denies fever, chills, still complaining of significant left knee pain. Unable to move left leg secondary to knee pain. Denies significant point tenderness back pain though does admit to some discomfort in back after her transportation to hospital.  no sob, no abdominal pain, no chest pain.       Allergies    No Known Allergies    Intolerances        ANTIMICROBIALS:  ceFAZolin   IVPB 2000 every 6 hours      OTHER MEDS:  acetaminophen   Tablet .. 650 milliGRAM(s) Oral every 6 hours PRN  atorvastatin 10 milliGRAM(s) Oral at bedtime  DULoxetine 60 milliGRAM(s) Oral daily  levothyroxine 25 MICROGram(s) Oral daily  morphine  - Injectable 2 milliGRAM(s) IV Push every 6 hours PRN  oxyCODONE    IR 10 milliGRAM(s) Oral every 4 hours PRN      Vital Signs Last 24 Hrs  T(C): 37.3 (09 Jun 2021 12:30), Max: 37.3 (08 Jun 2021 21:14)  T(F): 99.1 (09 Jun 2021 12:30), Max: 99.2 (08 Jun 2021 21:14)  HR: 73 (09 Jun 2021 12:30) (73 - 95)  BP: 137/61 (09 Jun 2021 12:30) (110/62 - 137/61)  BP(mean): --  RR: 17 (09 Jun 2021 12:30) (17 - 17)  SpO2: 95% (09 Jun 2021 12:30) (95% - 96%)    Physical Exam  Gen: morbidly obese, non-toxic, alert and oriented, uncomfortable  Lungs: CTA B/L,   Heart: RRR,   Abdomen: soft, non-ttp  Ext: b/l lymphedema, overlying chronic venous changes but no cellulitis. Given obesity, LE anatomy difficult to define, cannot clear appreciate joint swelling. ROM LLE extremely limited 2/2 knee pain  Skin: as above                            11.9   23.74 )-----------( 232      ( 09 Jun 2021 03:29 )             37.2       06-09    134<L>  |  96  |  12  ----------------------------<  99  4.1   |  23  |  0.58    Ca    9.3      09 Jun 2021 03:29    TPro  7.6  /  Alb  3.8  /  TBili  0.7  /  DBili  x   /  AST  16  /  ALT  11  /  AlkPhos  90  06-08          MICROBIOLOGY:Culture Results:   No growth to date. (06-08 @ 14:36)  Culture Results:   No growth to date. (06-08 @ 14:36)  Culture Results:   10,000 - 49,000 CFU/mL Pseudomonas aeruginosa  <10,000 CFU/ml Normal Urogenital armen present (06-07 @ 06:27)  Culture Results:   Growth in aerobic and anaerobic bottles: Staphylococcus aureus  See previous culture [id number] (06-07 @ 00:33)  Culture Results:   Growth in aerobic and anaerobic bottles: Staphylococcus aureus  ***Blood Panel PCR results on this specimen are available  approximately 3 hours after the Gram stain result.***  Gram stain, PCR, and/or culture results may not always  correspond dueto difference in methodologies.  ************************************************************  This PCR assay was performed by multiplex PCR. This  Assay tests for 66 bacterial and resistance gene targets.  Please refer to the [facility] test directory  at [email or web address] for details. (06-07 @ 00:33)    Culture - Body Fluid with Gram Stain (06.08.21 @ 22:17)   Gram Stain:   No polymorphonuclear cells seen   No organisms seen   by cytocentrifuge   Specimen Source: .Body Fluid Synovial Fluid Culture - Blood in AM (06.08.21 @ 14:36)   Specimen Source: .Blood Blood   Culture Results:   No growth to date. Culture - Blood (06.07.21 @ 00:33)   Gram Stain:   Growth in aerobic bottle: Gram Positive Cocci in Clusters   Growth in anaerobic bottle: Gram Positive Cocci in Clusters   - Staphylococcus aureus: Detec Any isolate of Staphylococcus aureus from a blood culture is NOT considered a contaminant.     RADIOLOGY:    < from: Xray Knee 3 Views, Left (06.06.21 @ 23:33) >  IMPRESSION:  1. Chronic appearing deformity of the left knee with external rotation ofthe lower leg and chronic appearing lateral dislocation of the patella.  2. Severe associated osteoarthritis is present with osseous remodeling and osteophytes.  3. Correlation with prior imaging is suggested if available. CT may be helpful for further evaluation if warranted.  4. This represents a change in interpretation from the preliminary report. This was communicated electronically by [employer] to the emergency room providers as per departmental protocol.      < from: Xray Chest 1 View- PORTABLE-Urgent (Xray Chest 1 View- PORTABLE-Urgent .) (06.06.21 @ 23:33) >  IMPRESSION:    Clear lungs.      < from: CT Knee w/ IV Cont, Left (06.08.21 @ 11:07) >    IMPRESSION:    Severe tricompartmental arthrosis of the knee. Subchondral cystic change and nonspecific erosions along the lateral tibiofemoral compartment. Small knee joint effusion without synovial enhancement to suggest synovitis.    Nonspecific synovitis about the knee, most prominent medially. No peripherally enhancing fluid collection to suggest abscess. No subcutaneous air.

## 2021-06-09 NOTE — PROGRESS NOTE ADULT - SUBJECTIVE AND OBJECTIVE BOX
Patient seen and evaluated this AM.  Pain under control.  Went for IR aspiration on 6/8.  Understands tentative plan for OR pending aspiration results.        ICU Vital Signs Last 24 Hrs  T(C): 37.3 (08 Jun 2021 21:14), Max: 37.6 (08 Jun 2021 12:15)  T(F): 99.2 (08 Jun 2021 21:14), Max: 99.6 (08 Jun 2021 12:15)  HR: 91 (08 Jun 2021 21:14) (91 - 95)  BP: 127/73 (08 Jun 2021 21:14) (127/73 - 147/64)  BP(mean): --  ABP: --  ABP(mean): --  RR: 17 (08 Jun 2021 21:14) (17 - 18)  SpO2: 96% (08 Jun 2021 21:14) (92% - 96%)      PE  Gen: NAD, resting comfortably, morbid obesity  LLE:  Exam limited by body habitus, difficult to appreciate any bony anatomy  Skin intact, no erythema or drainage, lower extremity cellulitic changes  TTP over knee  ROM limited by body habitus, poor ROM at baseline, pain with micromotion  +TA/EHL/FHL/GS  SILT L2-S1  + pulses  Compartments soft and compressible      Assessment: 71F, concerns for septic knee.    Plan:  - WBAT  - Await IR aspiration CC and CXs  - Possible OR for washout pending IR Asp results  - Please document medical clearance for OR  - Cards clearance documented  - DENIS

## 2021-06-09 NOTE — PROGRESS NOTE ADULT - ASSESSMENT
72yo female b/l lymphedema, morbid obesity, HTN, hypothyrodism, ambulates with walker p/w 2 days L knee pain preventing her from walking today. Pt found to have MSSA bacteremia with c/f septic left knee.    MSSA Bacteremia  -unclear original source, possibly through chronic lymphedema though patient without evidence of cellulitis at this time. Previous frequent cellulitis but none in last 9 months per patient.  -f/u repeat Bcx  -f/u TTE  -mild back discomfort after transportation to hospital but no significant localizing tenderness to suggest spinal abscess/OM as met foci  -left knee incredibly painful, severe limitation in ROM. CT imaging without significant effusion, study possibly limited by body habitus. Unable to obtain MRI given body habitus  - IR performed drainage, limited fluid, unable to get enough for cell count, only culture. Gram stain without PMN's, unclear if fluid is from knee given difficulty of procedure given body habitus  -high clinical concern for joint infection given pain, limited ROM in s/o MSSA bacteremia  -f/u with Ortho regarding OR, washout of left knee  -no hardware or artificial valves    Asymptomatic bacteriuria  -PA in Ucx, UA without pyuria and pt without symptoms, would not treat at this time

## 2021-06-09 NOTE — DIETITIAN INITIAL EVALUATION ADULT. - PHYSCIAL ASSESSMENT
IBW per upper 10% of IBW     Skin per nursing flowsheets: DTI to R and L posterior thighs, DTI to L lower posterior leg, stage I to L and R heels, suspected DTI to coccyx

## 2021-06-09 NOTE — DIETITIAN NUTRITION RISK NOTIFICATION - TREATMENT: THE FOLLOWING DIET HAS BEEN RECOMMENDED
Diet, Regular:   Toni(7 Gm Arginine/7 Gm Glut/1.2 Gm HMB     Qty per Day:  2 (06-09-21 @ 13:00) [Active]  Diet, NPO after Midnight:      NPO Start Date: 08-Jun-2021,   NPO Start Time: 23:59  Except Medications (06-08-21 @ 21:07) [Active]

## 2021-06-09 NOTE — PROGRESS NOTE ADULT - SUBJECTIVE AND OBJECTIVE BOX
Patient is a 71y old  Female who presents with a chief complaint of Left knee pain today (08 Jun 2021 17:01)      SUBJECTIVE / OVERNIGHT EVENTS: no events over night       T(C): 37.3 (06-09-21 @ 12:30), Max: 37.3 (06-09-21 @ 12:30)  HR: 73 (06-09-21 @ 12:30) (73 - 95)  BP: 137/61 (06-09-21 @ 12:30) (110/62 - 137/61)  RR: 17 (06-09-21 @ 12:30) (17 - 17)  SpO2: 95% (06-09-21 @ 12:30) (95% - 96%)      MEDICATIONS  (STANDING):  atorvastatin 10 milliGRAM(s) Oral at bedtime  ceFAZolin   IVPB 2000 milliGRAM(s) IV Intermittent every 6 hours  DULoxetine 60 milliGRAM(s) Oral daily  levothyroxine 25 MICROGram(s) Oral daily    MEDICATIONS  (PRN):  acetaminophen   Tablet .. 650 milliGRAM(s) Oral every 6 hours PRN Temp greater or equal to 38C (100.4F)  morphine  - Injectable 2 milliGRAM(s) IV Push every 6 hours PRN Severe Pain (7 - 10)  oxyCODONE    IR 10 milliGRAM(s) Oral every 4 hours PRN Moderate Pain (4 - 6)    PHYSICAL EXAM:  GENERAL: NAD, well-developed  HEAD:  Atraumatic, Normocephalic  EYES: EOMI, conjunctiva and sclera clear  NECK: Supple, No JVD  CHEST/LUNG: Clear to auscultation bilaterally; No wheeze  HEART: Regular rate and rhythm; No murmurs, rubs, or gallops  ABDOMEN: Soft, Nontender, Nondistended; Bowel sounds present  EXTREMITIES:  Left knee swollen, erythematous   2+ Peripheral Pulses, No clubbing, cyanosis, or edema  PSYCH: AAOx3  NEUROLOGY: non-focal  SKIN: No rashes or lesions                                                 11.9   23.74 )-----------( 232      ( 09 Jun 2021 03:29 )             37.2           LIVER FUNCTIONS - ( 08 Jun 2021 10:52 )  Alb: 3.8 g/dL / Pro: 7.6 g/dL / ALK PHOS: 90 U/L / ALT: 11 U/L / AST: 16 U/L / GGT: x           PT/INR - ( 09 Jun 2021 03:29 )   PT: 15.7 sec;   INR: 1.33 ratio         PTT - ( 09 Jun 2021 03:29 )  PTT:33.2 sec  134|96|12<99  4.1|23|0.58  9.3,--,--  06-09 @ 03:29    CAPILLARY BLOOD GLUCOSE          RADIOLOGY & ADDITIONAL TESTS:    Imaging Personally Reviewed:    Consultant(s) Notes Reviewed:      Care Discussed with Consultants/Other Providers:

## 2021-06-09 NOTE — DIETITIAN INITIAL EVALUATION ADULT. - ORAL INTAKE PTA/DIET HISTORY
Pt visited at bedside who reports good appetite/PO intake PTA; endorses consuming x2 meals/day + "one fruit." Pt reports following a regular diet PTA. Denies any difficulties chewing or swallowing. Endorses taking a multivitamin, calcium, and vitamin D3 PTA. Confirms NKFA.

## 2021-06-09 NOTE — DIETITIAN INITIAL EVALUATION ADULT. - ADD RECOMMEND
1. 1. Continue NPO as ordered, advance to Regular Diet as medically feasible 2. Recommend Multivitamin and 500mg Vitamin C to aid in wound healing 3. Recommend Toni x2/day (provides 90 Kcal, 2.5 g collagen protein, 14g of essential amino acids per 8 oz. serving) 4. Monitor weight trends, diet advancement, PO intake, GI function, electrolytes, and skin integrity

## 2021-06-09 NOTE — DIETITIAN INITIAL EVALUATION ADULT. - REASON
Pt with no overt signs of body fat or muscle mass depletion. Appearance is consistent with age status.

## 2021-06-10 LAB
-  AMPICILLIN/SULBACTAM: SIGNIFICANT CHANGE UP
-  CEFAZOLIN: SIGNIFICANT CHANGE UP
-  CLINDAMYCIN: SIGNIFICANT CHANGE UP
-  ERYTHROMYCIN: SIGNIFICANT CHANGE UP
-  GENTAMICIN: SIGNIFICANT CHANGE UP
-  OXACILLIN: SIGNIFICANT CHANGE UP
-  PENICILLIN: SIGNIFICANT CHANGE UP
-  RIFAMPIN: SIGNIFICANT CHANGE UP
-  TETRACYCLINE: SIGNIFICANT CHANGE UP
-  TRIMETHOPRIM/SULFAMETHOXAZOLE: SIGNIFICANT CHANGE UP
-  VANCOMYCIN: SIGNIFICANT CHANGE UP
ANION GAP SERPL CALC-SCNC: 15 MMOL/L — SIGNIFICANT CHANGE UP (ref 5–17)
APTT BLD: 33.8 SEC — SIGNIFICANT CHANGE UP (ref 27.5–35.5)
BUN SERPL-MCNC: 15 MG/DL — SIGNIFICANT CHANGE UP (ref 7–23)
CALCIUM SERPL-MCNC: 9.4 MG/DL — SIGNIFICANT CHANGE UP (ref 8.4–10.5)
CHLORIDE SERPL-SCNC: 95 MMOL/L — LOW (ref 96–108)
CO2 SERPL-SCNC: 23 MMOL/L — SIGNIFICANT CHANGE UP (ref 22–31)
CREAT SERPL-MCNC: 0.61 MG/DL — SIGNIFICANT CHANGE UP (ref 0.5–1.3)
GLUCOSE SERPL-MCNC: 119 MG/DL — HIGH (ref 70–99)
HCT VFR BLD CALC: 42.4 % — SIGNIFICANT CHANGE UP (ref 34.5–45)
HGB BLD-MCNC: 13 G/DL — SIGNIFICANT CHANGE UP (ref 11.5–15.5)
INR BLD: 1.19 RATIO — HIGH (ref 0.88–1.16)
MCHC RBC-ENTMCNC: 29 PG — SIGNIFICANT CHANGE UP (ref 27–34)
MCHC RBC-ENTMCNC: 30.7 GM/DL — LOW (ref 32–36)
MCV RBC AUTO: 94.4 FL — SIGNIFICANT CHANGE UP (ref 80–100)
METHOD TYPE: SIGNIFICANT CHANGE UP
NRBC # BLD: 0 /100 WBCS — SIGNIFICANT CHANGE UP (ref 0–0)
PLATELET # BLD AUTO: 202 K/UL — SIGNIFICANT CHANGE UP (ref 150–400)
POTASSIUM SERPL-MCNC: 4.2 MMOL/L — SIGNIFICANT CHANGE UP (ref 3.5–5.3)
POTASSIUM SERPL-SCNC: 4.2 MMOL/L — SIGNIFICANT CHANGE UP (ref 3.5–5.3)
PROTHROM AB SERPL-ACNC: 14.2 SEC — HIGH (ref 10.6–13.6)
RBC # BLD: 4.49 M/UL — SIGNIFICANT CHANGE UP (ref 3.8–5.2)
RBC # FLD: 13.8 % — SIGNIFICANT CHANGE UP (ref 10.3–14.5)
SODIUM SERPL-SCNC: 133 MMOL/L — LOW (ref 135–145)
WBC # BLD: 17.25 K/UL — HIGH (ref 3.8–10.5)
WBC # FLD AUTO: 17.25 K/UL — HIGH (ref 3.8–10.5)

## 2021-06-10 PROCEDURE — 99232 SBSQ HOSP IP/OBS MODERATE 35: CPT

## 2021-06-10 RX ORDER — CHLORHEXIDINE GLUCONATE 213 G/1000ML
1 SOLUTION TOPICAL DAILY
Refills: 0 | Status: DISCONTINUED | OUTPATIENT
Start: 2021-06-10 | End: 2021-06-23

## 2021-06-10 RX ORDER — ONDANSETRON 8 MG/1
4 TABLET, FILM COATED ORAL ONCE
Refills: 0 | Status: DISCONTINUED | OUTPATIENT
Start: 2021-06-10 | End: 2021-06-10

## 2021-06-10 RX ORDER — HYDROMORPHONE HYDROCHLORIDE 2 MG/ML
0.25 INJECTION INTRAMUSCULAR; INTRAVENOUS; SUBCUTANEOUS
Refills: 0 | Status: DISCONTINUED | OUTPATIENT
Start: 2021-06-10 | End: 2021-06-10

## 2021-06-10 RX ORDER — HEPARIN SODIUM 5000 [USP'U]/ML
5000 INJECTION INTRAVENOUS; SUBCUTANEOUS EVERY 8 HOURS
Refills: 0 | Status: DISCONTINUED | OUTPATIENT
Start: 2021-06-10 | End: 2021-06-23

## 2021-06-10 RX ADMIN — Medication 100 MILLIGRAM(S): at 10:29

## 2021-06-10 RX ADMIN — HEPARIN SODIUM 5000 UNIT(S): 5000 INJECTION INTRAVENOUS; SUBCUTANEOUS at 18:39

## 2021-06-10 RX ADMIN — Medication 100 MILLIGRAM(S): at 23:20

## 2021-06-10 RX ADMIN — DULOXETINE HYDROCHLORIDE 60 MILLIGRAM(S): 30 CAPSULE, DELAYED RELEASE ORAL at 11:38

## 2021-06-10 RX ADMIN — Medication 25 MICROGRAM(S): at 05:19

## 2021-06-10 RX ADMIN — ATORVASTATIN CALCIUM 10 MILLIGRAM(S): 80 TABLET, FILM COATED ORAL at 23:20

## 2021-06-10 RX ADMIN — OXYCODONE HYDROCHLORIDE 10 MILLIGRAM(S): 5 TABLET ORAL at 21:00

## 2021-06-10 RX ADMIN — OXYCODONE HYDROCHLORIDE 10 MILLIGRAM(S): 5 TABLET ORAL at 20:30

## 2021-06-10 RX ADMIN — OXYCODONE HYDROCHLORIDE 10 MILLIGRAM(S): 5 TABLET ORAL at 05:20

## 2021-06-10 RX ADMIN — Medication 100 MILLIGRAM(S): at 04:34

## 2021-06-10 RX ADMIN — HEPARIN SODIUM 5000 UNIT(S): 5000 INJECTION INTRAVENOUS; SUBCUTANEOUS at 05:20

## 2021-06-10 RX ADMIN — OXYCODONE HYDROCHLORIDE 10 MILLIGRAM(S): 5 TABLET ORAL at 04:34

## 2021-06-10 NOTE — PROGRESS NOTE ADULT - ASSESSMENT
72yo female b/l lymphedema, morbid obesity, HTN, hypothyrodism, ambulates with walker p/w 2 days L knee pain preventing her from walking today. Pt found to have MSSA bacteremia c/b septic left knee    MSSA Bacteremia  -c/w Cefazolin. Total dose increased, now 2g q6 given significant obesity.  -unclear original source, possibly through chronic lymphedema though patient without evidence of cellulitis at this time. Previous frequent cellulitis but none in last 9 months per patient.  -repeat Bcx 6/8 NTD x 2  -TTE: Valves not well visualized, cannot rule out vegetation. If repeat Bcx not clear consider DENIS.  -left knee incredibly painful, severe limitation in ROM. CT imaging without significant effusion, study possibly limited by body habitus. Unable to obtain MRI given body habitus  - IR performed drainage, limited fluid, unable to get enough for cell count, how culture growing Staph aureus, confirming clinical suspicion of Septic Native Joint. Pending OR for washout by Ortho today  -Continued to endorse back pain, point tenderness to palpation midline thoraco-lumbar junction. Will likely need further imaging. MRI ideal but cannot obtain 2/2 pt's body habitus. Consideration of CT with contrast vs NM study to assess for OM/Discitis/Paravertebral abscess in s/o MSSA bacteremia  -no hardware or artificial valves    Asymptomatic bacteriuria  -PA in Ucx, UA without pyuria and pt without symptoms, would not treat at this time

## 2021-06-10 NOTE — PROGRESS NOTE ADULT - ASSESSMENT
70yo female b/l lymphedema, morbid obesity, HTN, hypothyrodism, ambulates with walker p/w 2 days L knee pain     Left knee pain- Reviewed imaging. Ortho consult appreciated.     Recommend MRI Left knee   s/p IR aspiration of Left Knee.    Patient for OR aspiration of Left Knee today.   Patient high risk for intermediate risk surgery.   Continue with Ancef as per ID.   ID following.   TTE shows Endocardial visualization enhanced with intravenous  injection of Ultrasonic Enhancing Agent (Definity), preserved left ventricular ejection fraction.    Hypothyroidism Continue with Synthroid     HTN Continue with Lisinopril. Hold Norvasc.

## 2021-06-10 NOTE — CHART NOTE - NSCHARTNOTEFT_GEN_A_CORE
Patient seen in PACU resting without complaints.  No Chest Pain, SOB, N/V.    T(C): 36.3 (06-10-21 @ 18:24), Max: 37.6 (06-10-21 @ 14:11)  HR: 75 (06-10-21 @ 18:24) (70 - 99)  BP: 139/64 (06-10-21 @ 18:00) (106/76 - 157/79)  RR: 16 (06-10-21 @ 18:24) (15 - 18)  SpO2: 92% (06-10-21 @ 18:24) (92% - 100%)  Wt(kg): --    Exam:  Alert and oriented, no acute distress  Laterality: LLE dressing in place C/D/I with HMV in place maintaining good suction  Calves soft, non-tender bilaterally  +PF/DF/EHL/FHL  SILT  + DP pulse    Xray: in chart                          13.0   17.25 )-----------( 202      ( 10 Jorge 2021 07:21 )             42.4    06-10    133<L>  |  95<L>  |  15  ----------------------------<  119<H>  4.2   |  23  |  0.61    Ca    9.4      10 Jorge 2021 08:55        A/P: 71yFemale S/p  L Knee I&D. VSS. NAD  -PT/OT-WBAT LLE, OOB when tolerated  -IS encouraged  -DVT PPx with heparin  -Followup AM labs  -Pain Control  -Abx as per ID  -Care as per primary team    Wendy Navarro PA-C  Team Pager #[phone]

## 2021-06-10 NOTE — BRIEF OPERATIVE NOTE - OPERATION/FINDINGS
Irrigation and debridement of left knee performed. Gross purulence encountered in knee joint. Normal saline with polymixin irrigated through joint. Large hemovac placed in knee.

## 2021-06-10 NOTE — PROGRESS NOTE ADULT - SUBJECTIVE AND OBJECTIVE BOX
Patient is a 71y old  Female who presents with a chief complaint of Left knee pain today (08 Jun 2021 17:01)      SUBJECTIVE / OVERNIGHT EVENTS: no events over night     T(C): 36.8 (06-10-21 @ 19:09), Max: 37.6 (06-10-21 @ 14:11)  HR: 77 (06-10-21 @ 19:09) (70 - 89)  BP: 161/80 (06-10-21 @ 19:09) (106/76 - 161/80)  RR: 16 (06-10-21 @ 19:09) (15 - 18)  SpO2: 98% (06-10-21 @ 19:09) (92% - 100%)    MEDICATIONS  (STANDING):  atorvastatin 10 milliGRAM(s) Oral at bedtime  ceFAZolin   IVPB 2000 milliGRAM(s) IV Intermittent every 6 hours  chlorhexidine 4% Liquid 1 Application(s) Topical daily  DULoxetine 60 milliGRAM(s) Oral daily  heparin   Injectable 5000 Unit(s) SubCutaneous every 8 hours  levothyroxine 25 MICROGram(s) Oral daily    MEDICATIONS  (PRN):  acetaminophen   Tablet .. 650 milliGRAM(s) Oral every 6 hours PRN Temp greater or equal to 38C (100.4F)  morphine  - Injectable 2 milliGRAM(s) IV Push every 6 hours PRN Severe Pain (7 - 10)  oxyCODONE    IR 10 milliGRAM(s) Oral every 4 hours PRN Moderate Pain (4 - 6)            PHYSICAL EXAM:  GENERAL: NAD, well-developed  HEAD:  Atraumatic, Normocephalic  EYES: EOMI, conjunctiva and sclera clear  NECK: Supple, No JVD  CHEST/LUNG: Clear to auscultation bilaterally; No wheeze  HEART: Regular rate and rhythm; No murmurs, rubs, or gallops  ABDOMEN: Soft, Nontender, Nondistended; Bowel sounds present  EXTREMITIES:  Left knee swollen, erythematous   2+ Peripheral Pulses, No clubbing, cyanosis, or edema  PSYCH: AAOx3  NEUROLOGY: non-focal  SKIN: No rashes or lesions                                              13.0   17.25 )-----------( 202      ( 10 Jorge 2021 07:21 )             42.4             PT/INR - ( 10 Jorge 2021 08:55 )   PT: 14.2 sec;   INR: 1.19 ratio         PTT - ( 10 Jorge 2021 08:55 )  PTT:33.8 sec  133|95|15<119  4.2|23|0.61  9.4,--,--  06-10 @ 08:55          RADIOLOGY & ADDITIONAL TESTS:    Imaging Personally Reviewed:    Consultant(s) Notes Reviewed:      Care Discussed with Consultants/Other Providers:

## 2021-06-10 NOTE — PROGRESS NOTE ADULT - SUBJECTIVE AND OBJECTIVE BOX
S: c/o persistent L knee pain. Denies chest pain or SOB. Review of systems otherwise (-)    Review of Systems:   Constitutional: [ ] fevers, [ ] chills.   Skin: [ ] dry skin. [ ] rashes.  Psychiatric: [ ] depression, [ ] anxiety.   Gastrointestinal: [ ] BRBPR, [ ] melena.   Neurological: [ ] confusion. [ ] seizures. [ ] shuffling gait.   Ears,Nose,Mouth and Throat: [ ] ear pain [ ] sore throat.   Eyes: [ ] diplopia.   Respiratory: [ ] hemoptysis. [ ] shortness of breath  Cardiovascular: See HPI above  Hematologic/Lymphatic: [ ] anemia. [ ] painful nodes. [ ] prolonged bleeding.   Genitourinary: [ ] hematuria. [ ] flank pain.   Endocrine: [ ] significant change in weight. [ ] intolerance to heat and cold.     Review of systems [ x] otherwise negative, [ ] otherwise unable to obtain    FH: no family history of sudden cardiac death in first degree relatives    SH: [ ] tobacco, [ ] alcohol, [ ] drugs      MEDICATIONS  (STANDING):  atorvastatin 10 milliGRAM(s) Oral at bedtime  ceFAZolin   IVPB 2000 milliGRAM(s) IV Intermittent every 6 hours  DULoxetine 60 milliGRAM(s) Oral daily  levothyroxine 25 MICROGram(s) Oral daily    MEDICATIONS  (PRN):  acetaminophen   Tablet .. 650 milliGRAM(s) Oral every 6 hours PRN Temp greater or equal to 38C (100.4F)  morphine  - Injectable 2 milliGRAM(s) IV Push every 6 hours PRN Severe Pain (7 - 10)  oxyCODONE    IR 10 milliGRAM(s) Oral every 4 hours PRN Moderate Pain (4 - 6)      LABS:                          13.0   17.25 )-----------( 202      ( 10 Jorge 2021 07:21 )             42.4     Hemoglobin: 13.0 g/dL (06-10 @ 07:21)  Hemoglobin: 11.9 g/dL (06-09 @ 03:29)  Hemoglobin: 12.3 g/dL (06-08 @ 10:48)  Hemoglobin: 12.4 g/dL (06-08 @ 01:44)  Hemoglobin: 12.3 g/dL (06-07 @ 11:16)    06-10    133<L>  |  95<L>  |  15  ----------------------------<  119<H>  4.2   |  23  |  0.61    Ca    9.4      10 Jorge 2021 08:55      Creatinine Trend: 0.61<--, 0.58<--, 0.61<--, 0.64<--, 0.65<--, 0.72<--   PT/INR - ( 10 Jorge 2021 08:55 )   PT: 14.2 sec;   INR: 1.19 ratio         PTT - ( 10 Jorge 2021 08:55 )  PTT:33.8 sec          06-09-21 @ 07:01  -  06-10-21 @ 07:00  --------------------------------------------------------  IN: 300 mL / OUT: 0 mL / NET: 300 mL    06-10-21 @ 07:01  -  06-10-21 @ 15:20  --------------------------------------------------------  IN: 50 mL / OUT: 0 mL / NET: 50 mL        PHYSICAL EXAM  Vital Signs Last 24 Hrs  T(C): 37 (10 Jorge 2021 14:18), Max: 37.7 (09 Jun 2021 17:09)  T(F): 99.7 (10 Jorge 2021 14:11), Max: 99.9 (09 Jun 2021 17:09)  HR: 82 (10 Jorge 2021 14:18) (74 - 99)  BP: 106/76 (10 Jorge 2021 14:18) (106/76 - 157/79)  BP(mean): --  RR: 17 (10 Jorge 2021 14:18) (16 - 18)  SpO2: 99% (10 Jorge 2021 14:18) (93% - 99%)        Gen: Appears well in NAD  HEENT:  (-)icterus (-)pallor  CV: N S1 S2 1/6 DIETER (+)2 Pulses B/l  Resp:  Clear to auscultation B/L, normal effort  GI: (+) BS Soft, NT, ND  Lymph:  (+) B/L chronic LE Edema, (-)obvious lymphadenopathy  Skin: Warm to touch, Normal turgor  Psych: Appropriate mood and affect    TELEMETRY: None	      ECG: NSR, low voltage QRS     < from: TTE with Doppler (w/Cont) (06.08.21 @ 16:27) >  Conclusions:  Technically difficult study-valves not well visualized.  1. Endocardial visualization enhanced with intravenous  injection of Ultrasonic Enhancing Agent (Definity). Overall  preserved left ventricular ejection fraction. Wall motion  evaluation is limited.  2. No Valves visualized. Unable to exclude valvular  vegetations in the setting of a technically difficult  study.  3. DENIS could be obtained for further evaluation of valvular  vegetations, if clinical suspicion of endocarditis is high.    *** Noprevious Echo exam.    < end of copied text >  	    RADIOLOGY:         CXR: Clear lungs    ASSESSMENT/PLAN: Patient is a 70 y/o female with PMH of b/l lymphedema, morbid obesity, HTN, and hypothyroidism who presented with L knee pain and inability to walk concerning for septic joint and found to have staph aureus bacteremia. Cardiology consulted for preop clearance.     - No evidence of clinical HF, anginal symptoms, or unstable cardiac syndromes  - Ortho follow up - pending OR  - Abx per ID  - TTE noted difficult study, preserved LVEF (may need to consider eventual DENIS if high clinical suspicion for endocarditis pending ID recs)  - Based on patient's current RCRI score, would consider her low cardiac risk. Patient is optimized from CV perspective for ortho procedures.  - No further cardiac testing needed prior to OR  - Would have anesthesia eval regarding airway if OR planned given morbid obesity    Paras Perez PA-C  Pager: [phone]

## 2021-06-10 NOTE — CHART NOTE - NSCHARTNOTEFT_GEN_A_CORE
Knee asp + for SA.  Please hold DVT ppx and keep NPO for now in case of possible OR today  Will update the team ASAP with final OR plan/recs either today or tomorrow Knee asp + for SA.  Plan for OR this AM.  Please hold DVT ppx and keep NPO

## 2021-06-10 NOTE — PROGRESS NOTE ADULT - SUBJECTIVE AND OBJECTIVE BOX
Follow Up:  MSSA BSI, Septic Knee    Inverval History/ROS:Patient is a 71y old  Female who presents with a chief complaint of Left knee pain today (09 Jun 2021 16:40)    Pt is waiting for OR. Denies fever, chills. Pain in left knee unchanged. Continued to complain of back pain.    Afebrile with decreasing leukocytosis. Synovial fluid cx returned positive for Staph aureus.    Allergies    No Known Allergies    Intolerances        ANTIMICROBIALS:  ceFAZolin   IVPB 2000 every 6 hours      OTHER MEDS:  acetaminophen   Tablet .. 650 milliGRAM(s) Oral every 6 hours PRN  atorvastatin 10 milliGRAM(s) Oral at bedtime  DULoxetine 60 milliGRAM(s) Oral daily  levothyroxine 25 MICROGram(s) Oral daily  morphine  - Injectable 2 milliGRAM(s) IV Push every 6 hours PRN  oxyCODONE    IR 10 milliGRAM(s) Oral every 4 hours PRN      Vital Signs Last 24 Hrs  T(C): 37 (10 Jorge 2021 10:37), Max: 37.7 (09 Jun 2021 17:09)  T(F): 98.6 (10 Jorge 2021 10:37), Max: 99.9 (09 Jun 2021 17:09)  HR: 82 (10 Jorge 2021 10:37) (74 - 99)  BP: 106/76 (10 Jorge 2021 10:37) (106/76 - 157/79)  BP(mean): --  RR: 17 (10 Jorge 2021 10:37) (17 - 18)  SpO2: 99% (10 Jorge 2021 10:37) (93% - 99%)    Physical Exam  Gen: morbidly obese, non-toxic, slightly anxious  Lungs: CTA B/L, no w/r/r  Heart: RRR, no m/r/g  Back: Moderate point tender to palpation around thoraco-lumbar junction.  Abdomen: soft, non-ttp  Ext: chronic b/l lymphedema, chronic venous changes without cellulitis. LE anatomy difficult to delineate given significant obesity and lymphedema. LLE ROM severely limited by pain.  Skin: no rashes                          13.0   17.25 )-----------( 202      ( 10 Jorge 2021 07:21 )             42.4       06-10    133<L>  |  95<L>  |  15  ----------------------------<  119<H>  4.2   |  23  |  0.61    Ca    9.4      10 Jorge 2021 08:55            MICROBIOLOGY:Culture Results:   Culture - Body Fluid with Gram Stain (06.08.21 @ 22:17)   Gram Stain:   No polymorphonuclear cells seen   No organisms seen   by cytocentrifuge   Specimen Source: .Body Fluid Synovial Fluid   Culture Results:   Rare Staphylococcus aureus Culture - Blood in AM (06.08.21 @ 14:36)   Specimen Source: .Blood Blood   Culture Results:   No growth to date.   Culture Results:   No growth to date. (06-08 @ 14:36)  Culture Results:   10,000 - 49,000 CFU/mL Pseudomonas aeruginosa  <10,000 CFU/ml Normal Urogenital armen present (06-07 @ 06:27)  Culture Results:   Growth in aerobic and anaerobic bottles: Staphylococcus aureus  See previous culture [id number] (06-07 @ 00:33)  Culture Results:   Growth in aerobic and anaerobic bottles: Staphylococcus aureus  ***Blood Panel PCR results on this specimen are available  approximately 3 hours after the Gram stain result.***  Gram stain, PCR, and/or culture results may not always  correspond dueto difference in methodologies.  ************************************************************  This PCR assay was performed by multiplex PCR. This  Assay tests for 66 bacterial and resistance gene targets.  Please refer to the [facility] test directory  at [email or web address] for details. (06-07 @ 00:33)      RADIOLOGY:  < from: TTE with Doppler (w/Cont) (06.08.21 @ 16:27) >  Observations:  Mitral Valve: Mitral valve not well visualized. Mild mitral  regurgitation.  Aortic Valve/Aorta: Aortic valve not well visualized.  not well visualized.  Left Atrium: Left atrium not well visualized.  Left Ventricle: Endocardial visualization enhanced with  intravenous injection of Ultrasonic Enhancing Agent  (Definity). Overall preserved left ventricular ejection  fraction. Wall motion evaluation is limited. not well  visualized. Unable to evaluate diastology.  Right Heart: Right atrium not well visualized. The right  ventricle is not well visualized; grossly normal right  ventricular systolic function. Tricuspid valve not well  visualized. Pulmonic valve not well visualized.  Pericardium/Pleura: No pericardial effusion seen.  ------------------------------------------------------------------------  Conclusions:  Technically difficult study-valves not well visualized.  1. Endocardial visualization enhanced with intravenous  injection of Ultrasonic Enhancing Agent (Definity). Overall  preserved left ventricular ejection fraction. Wall motion  evaluation is limited.  2. No Valves visualized. Unable to exclude valvular  vegetations in the setting of a technically difficult  study.  3. DENIS could be obtained for further evaluation of valvular  vegetations, if clinical suspicion of endocarditis is high.      < end of copied text >      < from: Xray Knee 3 Views, Left (06.06.21 @ 23:33) >  IMPRESSION:  1. Chronic appearing deformity of the left knee with external rotation ofthe lower leg and chronic appearing lateral dislocation of the patella.  2. Severe associated osteoarthritis is present with osseous remodeling and osteophytes.  3. Correlation with prior imaging is suggested if available. CT may be helpful for further evaluation if warranted.  4. This represents a change in interpretation from the preliminary report. This was communicated electronically by [employer] to the emergency room providers as per departmental protocol.      < from: Xray Chest 1 View- PORTABLE-Urgent (Xray Chest 1 View- PORTABLE-Urgent .) (06.06.21 @ 23:33) >  IMPRESSION:    Clear lungs.      < from: CT Knee w/ IV Cont, Left (06.08.21 @ 11:07) >    IMPRESSION:    Severe tricompartmental arthrosis of the knee. Subchondral cystic change and nonspecific erosions along the lateral tibiofemoral compartment. Small knee joint effusion without synovial enhancement to suggest synovitis.    Nonspecific synovitis about the knee, most prominent medially. No peripherally enhancing fluid collection to suggest abscess. No subcutaneous air.         Follow Up:  MSSA BSI, Septic Knee    Inverval History/ROS:Patient is a 71y old  Female who presents with a chief complaint of Left knee pain today (09 Jun 2021 16:40)    Pt is waiting for OR. Denies fever, chills. Pain in left knee unchanged. Continued to complain of back pain.    Afebrile with decreasing leukocytosis. Synovial fluid cx returned positive for Staph aureus. no SOB, no abdominal pain.       Allergies  No Known Allergies        ANTIMICROBIALS:  ceFAZolin   IVPB 2000 every 6 hours        Vital Signs Last 24 Hrs  T(C): 37 (10 Jorge 2021 10:37), Max: 37.7 (09 Jun 2021 17:09)  T(F): 98.6 (10 Jorge 2021 10:37), Max: 99.9 (09 Jun 2021 17:09)  HR: 82 (10 Jorge 2021 10:37) (74 - 99)  BP: 106/76 (10 Jorge 2021 10:37) (106/76 - 157/79)  BP(mean): --  RR: 17 (10 Jorge 2021 10:37) (17 - 18)  SpO2: 99% (10 Jorge 2021 10:37) (93% - 99%)      Physical Exam  Gen: morbidly obese, non-toxic, slightly anxious  Lungs: CTA B/L,   Heart: RRR,   Back: Moderate point tender to palpation around thoraco-lumbar junction.  Abdomen: soft, non-ttp  Ext: chronic b/l lymphedema, chronic venous changes without cellulitis. LE anatomy difficult to delineate given significant obesity and lymphedema. LLE ROM severely limited by pain.  Skin: no rashes                            13.0   17.25 )-----------( 202      ( 10 Jorge 2021 07:21 )             42.4       06-10    133<L>  |  95<L>  |  15  ----------------------------<  119<H>  4.2   |  23  |  0.61    Ca    9.4      10 Jorge 2021 08:55        MICROBIOLOGY: Culture Results:   Culture - Body Fluid with Gram Stain (06.08.21 @ 22:17)   Gram Stain:   No polymorphonuclear cells seen   No organisms seen   by cytocentrifuge   Specimen Source: .Body Fluid Synovial Fluid   Culture Results:   Rare Staphylococcus aureus Culture - Blood in AM (06.08.21 @ 14:36)   Specimen Source: .Blood Blood   Culture Results:   No growth to date.   Culture Results:   No growth to date. (06-08 @ 14:36)  Culture Results:   10,000 - 49,000 CFU/mL Pseudomonas aeruginosa  <10,000 CFU/ml Normal Urogenital armen present (06-07 @ 06:27)  Culture Results:   Growth in aerobic and anaerobic bottles: Staphylococcus aureus  See previous culture [id number] (06-07 @ 00:33)  Culture Results:   Growth in aerobic and anaerobic bottles: Staphylococcus aureus  ***Blood Panel PCR results on this specimen are available  approximately 3 hours after the Gram stain result.***  Gram stain, PCR, and/or culture results may not always  correspond dueto difference in methodologies.  ************************************************************  This PCR assay was performed by multiplex PCR. This  Assay tests for 66 bacterial and resistance gene targets.  Please refer to the [facility] test directory  at [email or web address] for details. (06-07 @ 00:33)      RADIOLOGY:  < from: TTE with Doppler (w/Cont) (06.08.21 @ 16:27) >  Observations:  Mitral Valve: Mitral valve not well visualized. Mild mitral  regurgitation.  Aortic Valve/Aorta: Aortic valve not well visualized.  not well visualized.  Left Atrium: Left atrium not well visualized.  Left Ventricle: Endocardial visualization enhanced with  intravenous injection of Ultrasonic Enhancing Agent  (Definity). Overall preserved left ventricular ejection  fraction. Wall motion evaluation is limited. not well  visualized. Unable to evaluate diastology.  Right Heart: Right atrium not well visualized. The right  ventricle is not well visualized; grossly normal right  ventricular systolic function. Tricuspid valve not well  visualized. Pulmonic valve not well visualized.  Pericardium/Pleura: No pericardial effusion seen.  ------------------------------------------------------------------------  Conclusions:  Technically difficult study-valves not well visualized.  1. Endocardial visualization enhanced with intravenous  injection of Ultrasonic Enhancing Agent (Definity). Overall  preserved left ventricular ejection fraction. Wall motion  evaluation is limited.  2. No Valves visualized. Unable to exclude valvular  vegetations in the setting of a technically difficult  study.  3. DENIS could be obtained for further evaluation of valvular  vegetations, if clinical suspicion of endocarditis is high.            < from: Xray Knee 3 Views, Left (06.06.21 @ 23:33) >  IMPRESSION:  1. Chronic appearing deformity of the left knee with external rotation ofthe lower leg and chronic appearing lateral dislocation of the patella.  2. Severe associated osteoarthritis is present with osseous remodeling and osteophytes.  3. Correlation with prior imaging is suggested if available. CT may be helpful for further evaluation if warranted.  4. This represents a change in interpretation from the preliminary report. This was communicated electronically by [employer] to the emergency room providers as per departmental protocol.      < from: Xray Chest 1 View- PORTABLE-Urgent (Xray Chest 1 View- PORTABLE-Urgent .) (06.06.21 @ 23:33) >  IMPRESSION:    Clear lungs.      < from: CT Knee w/ IV Cont, Left (06.08.21 @ 11:07) >    IMPRESSION:    Severe tricompartmental arthrosis of the knee. Subchondral cystic change and nonspecific erosions along the lateral tibiofemoral compartment. Small knee joint effusion without synovial enhancement to suggest synovitis.    Nonspecific synovitis about the knee, most prominent medially. No peripherally enhancing fluid collection to suggest abscess. No subcutaneous air.

## 2021-06-11 LAB
ALBUMIN SERPL ELPH-MCNC: 3.5 G/DL — SIGNIFICANT CHANGE UP (ref 3.3–5)
ALP SERPL-CCNC: 115 U/L — SIGNIFICANT CHANGE UP (ref 40–120)
ALT FLD-CCNC: 18 U/L — SIGNIFICANT CHANGE UP (ref 10–45)
ANION GAP SERPL CALC-SCNC: 13 MMOL/L — SIGNIFICANT CHANGE UP (ref 5–17)
ANION GAP SERPL CALC-SCNC: 14 MMOL/L — SIGNIFICANT CHANGE UP (ref 5–17)
APTT BLD: 31.3 SEC — SIGNIFICANT CHANGE UP (ref 27.5–35.5)
AST SERPL-CCNC: 29 U/L — SIGNIFICANT CHANGE UP (ref 10–40)
BASOPHILS # BLD AUTO: 0 K/UL — SIGNIFICANT CHANGE UP (ref 0–0.2)
BASOPHILS NFR BLD AUTO: 0 % — SIGNIFICANT CHANGE UP (ref 0–2)
BILIRUB SERPL-MCNC: 0.6 MG/DL — SIGNIFICANT CHANGE UP (ref 0.2–1.2)
BUN SERPL-MCNC: 12 MG/DL — SIGNIFICANT CHANGE UP (ref 7–23)
BUN SERPL-MCNC: 13 MG/DL — SIGNIFICANT CHANGE UP (ref 7–23)
CALCIUM SERPL-MCNC: 9.4 MG/DL — SIGNIFICANT CHANGE UP (ref 8.4–10.5)
CALCIUM SERPL-MCNC: 9.6 MG/DL — SIGNIFICANT CHANGE UP (ref 8.4–10.5)
CHLORIDE SERPL-SCNC: 95 MMOL/L — LOW (ref 96–108)
CHLORIDE SERPL-SCNC: 97 MMOL/L — SIGNIFICANT CHANGE UP (ref 96–108)
CO2 SERPL-SCNC: 26 MMOL/L — SIGNIFICANT CHANGE UP (ref 22–31)
CO2 SERPL-SCNC: 27 MMOL/L — SIGNIFICANT CHANGE UP (ref 22–31)
CREAT SERPL-MCNC: 0.51 MG/DL — SIGNIFICANT CHANGE UP (ref 0.5–1.3)
CREAT SERPL-MCNC: 0.53 MG/DL — SIGNIFICANT CHANGE UP (ref 0.5–1.3)
EOSINOPHIL # BLD AUTO: 0 K/UL — SIGNIFICANT CHANGE UP (ref 0–0.5)
EOSINOPHIL NFR BLD AUTO: 0 % — SIGNIFICANT CHANGE UP (ref 0–6)
GLUCOSE SERPL-MCNC: 104 MG/DL — HIGH (ref 70–99)
GLUCOSE SERPL-MCNC: 119 MG/DL — HIGH (ref 70–99)
HCT VFR BLD CALC: 35.9 % — SIGNIFICANT CHANGE UP (ref 34.5–45)
HCT VFR BLD CALC: 36.9 % — SIGNIFICANT CHANGE UP (ref 34.5–45)
HGB BLD-MCNC: 11.4 G/DL — LOW (ref 11.5–15.5)
HGB BLD-MCNC: 11.7 G/DL — SIGNIFICANT CHANGE UP (ref 11.5–15.5)
INR BLD: 1.26 RATIO — HIGH (ref 0.88–1.16)
LYMPHOCYTES # BLD AUTO: 1.55 K/UL — SIGNIFICANT CHANGE UP (ref 1–3.3)
LYMPHOCYTES # BLD AUTO: 8.8 % — LOW (ref 13–44)
MAGNESIUM SERPL-MCNC: 2.3 MG/DL — SIGNIFICANT CHANGE UP (ref 1.6–2.6)
MANUAL SMEAR VERIFICATION: SIGNIFICANT CHANGE UP
MCHC RBC-ENTMCNC: 28.9 PG — SIGNIFICANT CHANGE UP (ref 27–34)
MCHC RBC-ENTMCNC: 29.3 PG — SIGNIFICANT CHANGE UP (ref 27–34)
MCHC RBC-ENTMCNC: 31.7 GM/DL — LOW (ref 32–36)
MCHC RBC-ENTMCNC: 31.8 GM/DL — LOW (ref 32–36)
MCV RBC AUTO: 91.1 FL — SIGNIFICANT CHANGE UP (ref 80–100)
MCV RBC AUTO: 92.3 FL — SIGNIFICANT CHANGE UP (ref 80–100)
MONOCYTES # BLD AUTO: 2.3 K/UL — HIGH (ref 0–0.9)
MONOCYTES NFR BLD AUTO: 13.1 % — SIGNIFICANT CHANGE UP (ref 2–14)
NEUTROPHILS # BLD AUTO: 13.71 K/UL — HIGH (ref 1.8–7.4)
NEUTROPHILS NFR BLD AUTO: 78.1 % — HIGH (ref 43–77)
NEUTS HYPERSEG # BLD: PRESENT — SIGNIFICANT CHANGE UP
NRBC # BLD: 0 /100 WBCS — SIGNIFICANT CHANGE UP (ref 0–0)
PHOSPHATE SERPL-MCNC: 3.1 MG/DL — SIGNIFICANT CHANGE UP (ref 2.5–4.5)
PLAT MORPH BLD: NORMAL — SIGNIFICANT CHANGE UP
PLATELET # BLD AUTO: 299 K/UL — SIGNIFICANT CHANGE UP (ref 150–400)
PLATELET # BLD AUTO: 300 K/UL — SIGNIFICANT CHANGE UP (ref 150–400)
POTASSIUM SERPL-MCNC: 3.9 MMOL/L — SIGNIFICANT CHANGE UP (ref 3.5–5.3)
POTASSIUM SERPL-MCNC: 4.7 MMOL/L — SIGNIFICANT CHANGE UP (ref 3.5–5.3)
POTASSIUM SERPL-SCNC: 3.9 MMOL/L — SIGNIFICANT CHANGE UP (ref 3.5–5.3)
POTASSIUM SERPL-SCNC: 4.7 MMOL/L — SIGNIFICANT CHANGE UP (ref 3.5–5.3)
PROT SERPL-MCNC: 7.6 G/DL — SIGNIFICANT CHANGE UP (ref 6–8.3)
PROTHROM AB SERPL-ACNC: 14.9 SEC — HIGH (ref 10.6–13.6)
RBC # BLD: 3.94 M/UL — SIGNIFICANT CHANGE UP (ref 3.8–5.2)
RBC # BLD: 4 M/UL — SIGNIFICANT CHANGE UP (ref 3.8–5.2)
RBC # FLD: 13.4 % — SIGNIFICANT CHANGE UP (ref 10.3–14.5)
RBC # FLD: 13.4 % — SIGNIFICANT CHANGE UP (ref 10.3–14.5)
RBC BLD AUTO: SIGNIFICANT CHANGE UP
SODIUM SERPL-SCNC: 136 MMOL/L — SIGNIFICANT CHANGE UP (ref 135–145)
SODIUM SERPL-SCNC: 136 MMOL/L — SIGNIFICANT CHANGE UP (ref 135–145)
WBC # BLD: 17.56 K/UL — HIGH (ref 3.8–10.5)
WBC # BLD: 17.67 K/UL — HIGH (ref 3.8–10.5)
WBC # FLD AUTO: 17.56 K/UL — HIGH (ref 3.8–10.5)
WBC # FLD AUTO: 17.67 K/UL — HIGH (ref 3.8–10.5)

## 2021-06-11 PROCEDURE — 99232 SBSQ HOSP IP/OBS MODERATE 35: CPT

## 2021-06-11 RX ADMIN — Medication 100 MILLIGRAM(S): at 17:20

## 2021-06-11 RX ADMIN — Medication 100 MILLIGRAM(S): at 23:39

## 2021-06-11 RX ADMIN — OXYCODONE HYDROCHLORIDE 10 MILLIGRAM(S): 5 TABLET ORAL at 21:55

## 2021-06-11 RX ADMIN — Medication 25 MICROGRAM(S): at 05:29

## 2021-06-11 RX ADMIN — Medication 100 MILLIGRAM(S): at 10:45

## 2021-06-11 RX ADMIN — HEPARIN SODIUM 5000 UNIT(S): 5000 INJECTION INTRAVENOUS; SUBCUTANEOUS at 01:46

## 2021-06-11 RX ADMIN — OXYCODONE HYDROCHLORIDE 10 MILLIGRAM(S): 5 TABLET ORAL at 02:30

## 2021-06-11 RX ADMIN — HEPARIN SODIUM 5000 UNIT(S): 5000 INJECTION INTRAVENOUS; SUBCUTANEOUS at 17:20

## 2021-06-11 RX ADMIN — Medication 100 MILLIGRAM(S): at 05:29

## 2021-06-11 RX ADMIN — OXYCODONE HYDROCHLORIDE 10 MILLIGRAM(S): 5 TABLET ORAL at 13:33

## 2021-06-11 RX ADMIN — DULOXETINE HYDROCHLORIDE 60 MILLIGRAM(S): 30 CAPSULE, DELAYED RELEASE ORAL at 11:37

## 2021-06-11 RX ADMIN — OXYCODONE HYDROCHLORIDE 10 MILLIGRAM(S): 5 TABLET ORAL at 22:25

## 2021-06-11 RX ADMIN — HEPARIN SODIUM 5000 UNIT(S): 5000 INJECTION INTRAVENOUS; SUBCUTANEOUS at 10:35

## 2021-06-11 RX ADMIN — ATORVASTATIN CALCIUM 10 MILLIGRAM(S): 80 TABLET, FILM COATED ORAL at 21:55

## 2021-06-11 RX ADMIN — CHLORHEXIDINE GLUCONATE 1 APPLICATION(S): 213 SOLUTION TOPICAL at 11:35

## 2021-06-11 RX ADMIN — OXYCODONE HYDROCHLORIDE 10 MILLIGRAM(S): 5 TABLET ORAL at 14:33

## 2021-06-11 RX ADMIN — OXYCODONE HYDROCHLORIDE 10 MILLIGRAM(S): 5 TABLET ORAL at 01:45

## 2021-06-11 NOTE — PROGRESS NOTE ADULT - SUBJECTIVE AND OBJECTIVE BOX
S: L knee pain sig improved s/p OR. Denies chest pain or SOB. Review of systems otherwise (-)    Review of Systems:   Constitutional: [ ] fevers, [ ] chills.   Skin: [ ] dry skin. [ ] rashes.  Psychiatric: [ ] depression, [ ] anxiety.   Gastrointestinal: [ ] BRBPR, [ ] melena.   Neurological: [ ] confusion. [ ] seizures. [ ] shuffling gait.   Ears,Nose,Mouth and Throat: [ ] ear pain [ ] sore throat.   Eyes: [ ] diplopia.   Respiratory: [ ] hemoptysis. [ ] shortness of breath  Cardiovascular: See HPI above  Hematologic/Lymphatic: [ ] anemia. [ ] painful nodes. [ ] prolonged bleeding.   Genitourinary: [ ] hematuria. [ ] flank pain.   Endocrine: [ ] significant change in weight. [ ] intolerance to heat and cold.     Review of systems [ x] otherwise negative, [ ] otherwise unable to obtain    FH: no family history of sudden cardiac death in first degree relatives    SH: [ ] tobacco, [ ] alcohol, [ ] drugs      MEDICATIONS  (STANDING):  atorvastatin 10 milliGRAM(s) Oral at bedtime  ceFAZolin   IVPB 2000 milliGRAM(s) IV Intermittent every 6 hours  chlorhexidine 4% Liquid 1 Application(s) Topical daily  DULoxetine 60 milliGRAM(s) Oral daily  heparin   Injectable 5000 Unit(s) SubCutaneous every 8 hours  levothyroxine 25 MICROGram(s) Oral daily    MEDICATIONS  (PRN):  acetaminophen   Tablet .. 650 milliGRAM(s) Oral every 6 hours PRN Temp greater or equal to 38C (100.4F)  morphine  - Injectable 2 milliGRAM(s) IV Push every 6 hours PRN Severe Pain (7 - 10)  oxyCODONE    IR 10 milliGRAM(s) Oral every 4 hours PRN Moderate Pain (4 - 6)      LABS:                          11.7   17.56 )-----------( 300      ( 11 Jun 2021 10:22 )             36.9     Hemoglobin: 11.7 g/dL (06-11 @ 10:22)  Hemoglobin: 11.4 g/dL (06-11 @ 07:14)  Hemoglobin: 13.0 g/dL (06-10 @ 07:21)  Hemoglobin: 11.9 g/dL (06-09 @ 03:29)  Hemoglobin: 12.3 g/dL (06-08 @ 10:48)    06-11    136  |  95<L>  |  12  ----------------------------<  104<H>  3.9   |  27  |  0.51    Ca    9.4      11 Jun 2021 10:22  Phos  3.1     06-11  Mg     2.3     06-11    TPro  7.6  /  Alb  3.5  /  TBili  0.6  /  DBili  x   /  AST  29  /  ALT  18  /  AlkPhos  115  06-11    Creatinine Trend: 0.51<--, 0.53<--, 0.61<--, 0.58<--, 0.61<--, 0.64<--   PT/INR - ( 11 Jun 2021 07:17 )   PT: 14.9 sec;   INR: 1.26 ratio         PTT - ( 11 Jun 2021 07:17 )  PTT:31.3 sec          06-10-21 @ 07:01  -  06-11-21 @ 07:00  --------------------------------------------------------  IN: 730 mL / OUT: 955 mL / NET: -225 mL    06-11-21 @ 07:01  -  06-11-21 @ 13:25  --------------------------------------------------------  IN: 240 mL / OUT: 0 mL / NET: 240 mL        PHYSICAL EXAM  Vital Signs Last 24 Hrs  T(C): 37 (11 Jun 2021 10:27), Max: 37.6 (10 Jorge 2021 14:11)  T(F): 98.6 (11 Jun 2021 10:27), Max: 99.7 (10 Jorge 2021 14:11)  HR: 82 (11 Jun 2021 10:27) (70 - 89)  BP: 158/85 (11 Jun 2021 10:27) (106/76 - 161/80)  BP(mean): 102 (10 Jorge 2021 18:30) (92 - 102)  RR: 16 (11 Jun 2021 10:27) (15 - 18)  SpO2: 95% (11 Jun 2021 10:27) (92% - 100%)        Gen: Appears well in NAD  HEENT:  (-)icterus (-)pallor  CV: N S1 S2 1/6 DIETER (+)2 Pulses B/l  Resp:  Clear to auscultation B/L, normal effort  GI: (+) BS Soft, NT, ND  Lymph:  (+) B/L chronic LE Edema, (-)obvious lymphadenopathy  Skin: Warm to touch, Normal turgor  Psych: Appropriate mood and affect    TELEMETRY: None	      ECG: NSR, low voltage QRS     < from: TTE with Doppler (w/Cont) (06.08.21 @ 16:27) >  Conclusions:  Technically difficult study-valves not well visualized.  1. Endocardial visualization enhanced with intravenous  injection of Ultrasonic Enhancing Agent (Definity). Overall  preserved left ventricular ejection fraction. Wall motion  evaluation is limited.  2. No Valves visualized. Unable to exclude valvular  vegetations in the setting of a technically difficult  study.  3. DENIS could be obtained for further evaluation of valvular  vegetations, if clinical suspicion of endocarditis is high.    *** Noprevious Echo exam.    < end of copied text >  	    RADIOLOGY:         CXR: Clear lungs    ASSESSMENT/PLAN: Patient is a 70 y/o female with PMH of b/l lymphedema, morbid obesity, HTN, and hypothyroidism who presented with L knee pain and inability to walk concerning for septic joint and found to have staph aureus bacteremia. Cardiology consulted for preop clearance.     - Tolerated procedure well from CV perspective  - No evidence of clinical HF, anginal symptoms, or unstable cardiac syndromes  - Ortho follow up - s/p L knee I&D  - Abx per ID  - TTE noted difficult study, preserved LVEF (may need to consider eventual DENIS if high clinical suspicion for endocarditis pending ID recs)    Paras Perez PA-C  Pager: [phone]

## 2021-06-11 NOTE — PROVIDER CONTACT NOTE (CRITICAL VALUE NOTIFICATION) - BACKGROUND
pt admitted with knee pain
70yo female b/l lymphedema, morbid obesity, HTN, hypothyrodism, ambulates with walker p/w 2 days L knee pain preventing her from walking today.

## 2021-06-11 NOTE — PROGRESS NOTE ADULT - SUBJECTIVE AND OBJECTIVE BOX
71y old  Female who presents with a chief complaint of Left knee pain today (11 Jun 2021 13:24)      Interval history:  Afebrile, knee pain improved, s/p washout.       Allergies:   No Known Allergies      Antimicrobials:  ceFAZolin   IVPB 2000 milliGRAM(s) IV Intermittent every 6 hours      REVIEW OF SYSTEMS:  No chest pain   No SOB  No abdominal pain  No rash.       Vital Signs Last 24 Hrs  T(C): 36.9 (06-11-21 @ 14:37), Max: 37.2 (06-10-21 @ 23:49)  T(F): 98.4 (06-11-21 @ 14:37), Max: 98.9 (06-10-21 @ 23:49)  HR: 80 (06-11-21 @ 14:37) (70 - 87)  BP: 139/62 (06-11-21 @ 14:37) (121/81 - 161/80)  BP(mean): 102 (06-10-21 @ 18:30) (92 - 102)  RR: 17 (06-11-21 @ 14:37) (15 - 18)  SpO2: 94% (06-11-21 @ 14:37) (92% - 100%)      PHYSICAL EXAM:  Gen: morbidly obese, non-toxic,   Lungs: CTA B/L,   Heart: S1 S2 normal  Abdomen: soft, non-ttp  Ext: chronic b/l lymphedema, chronic venous changes   Lt knee with hemovac.   Skin: no rashes                          11.7   17.56 )-----------( 300      ( 11 Jun 2021 10:22 )             36.9   06-11    136  |  95<L>  |  12  ----------------------------<  104<H>  3.9   |  27  |  0.51    Ca    9.4      11 Jun 2021 10:22  Phos  3.1     06-11  Mg     2.3     06-11    TPro  7.6  /  Alb  3.5  /  TBili  0.6  /  DBili  x   /  AST  29  /  ALT  18  /  AlkPhos  115  06-11      LIVER FUNCTIONS - ( 11 Jun 2021 10:22 )  Alb: 3.5 g/dL / Pro: 7.6 g/dL / ALK PHOS: 115 U/L / ALT: 18 U/L / AST: 29 U/L / GGT: x               Culture - Body Fluid with Gram Stain (collected 08 Jun 2021 22:17)  Source: .Body Fluid Synovial Fluid  Gram Stain (08 Jun 2021 23:49):    No polymorphonuclear cells seen    No organisms seen    by cytocentrifuge  Preliminary Report (09 Jun 2021 19:55):    Rare Staphylococcus aureus  Organism: Staphylococcus aureus (10 Jorge 2021 17:59)  Organism: Staphylococcus aureus (10 Jorge 2021 17:59)

## 2021-06-11 NOTE — PROGRESS NOTE ADULT - ASSESSMENT
72yo female b/l lymphedema, morbid obesity, HTN, hypothyrodism, ambulates with walker p/w 2 days L knee pain preventing her from walking today. Pt found to have MSSA bacteremia c/b septic left knee    MSSA Bacteremia, leucocytosis, lt knee septic arthritis: s/p wash out yesterday   -c/w Cefazolin at 2g q6   -repeat Bcx 6/8 NTD x 2  -TTE: Valves not well visualized, cannot rule out vegetation. Will need DENIS.   -Ortho on board   -trend CBC for leucocytosis, continues to be elevated but pt clinically improved.       Asymptomatic bacteriuria  -PA in Ucx, UA without pyuria and pt without symptoms, would not treat at this time      Christiano [place]  Pager: [phone]. If no response or past 5 pm call [phone].     Please call ID service for questions over weekend at [phone].

## 2021-06-11 NOTE — PROGRESS NOTE ADULT - SUBJECTIVE AND OBJECTIVE BOX
Ortho Progress Note    S: Patient seen and examined. No acute events overnight. Pain well controlled with current regimen. Denies lightheadedness/dizziness, CP/SOB. Tolerating diet. Feeling better after I&D yesterday       O:  Physical Exam:  Gen: Laying in bed, NAD, alert and oriented.   Resp: Unlabored breathing  Ext: LLE- dressing c/d/i          Hemovac w/ serosanguinous output          EHL/FHL/TA/Sol intact          + SILT DP/SP/FRYE/Sa/T          extremity WWP, DP not palpable due to body habitus    Vital Signs Last 24 Hrs  T(C): 37.2 (10 Jorge 2021 23:49), Max: 37.6 (10 Jorge 2021 14:11)  T(F): 98.9 (10 Jorge 2021 23:49), Max: 99.7 (10 Jorge 2021 14:11)  HR: 73 (10 Jorge 2021 23:49) (70 - 89)  BP: 150/73 (10 Jorge 2021 23:49) (106/76 - 161/80)  BP(mean): 102 (10 Jorge 2021 18:30) (92 - 102)  RR: 17 (10 Jorge 2021 23:49) (15 - 18)  SpO2: 93% (10 Jorge 2021 23:49) (92% - 100%)                          13.0   17.25 )-----------( 202      ( 10 Jorge 2021 07:21 )             42.4       06-10    133<L>  |  95<L>  |  15  ----------------------------<  119<H>  4.2   |  23  |  0.61        PT/INR - ( 10 Jorge 2021 08:55 )   PT: 14.2 sec;   INR: 1.19 ratio         PTT - ( 10 Jorge 2021 08:55 )  PTT:33.8 sec

## 2021-06-11 NOTE — PROVIDER CONTACT NOTE (CRITICAL VALUE NOTIFICATION) - SITUATION
body fluid 06/08 /21-rare staphylococcus Aureus
Lab called for critical result blood culture, gram positive cocci growth in clusters in aerobic bottle.

## 2021-06-11 NOTE — PROGRESS NOTE ADULT - ASSESSMENT
A/P: 71F POD1 s/p I&D L knee    - Pain control  - Monitor hemovac output  - Antibiotics pending speciation  - DVT ppx per primary team  - Appreciate care per primary team

## 2021-06-11 NOTE — PROGRESS NOTE ADULT - SUBJECTIVE AND OBJECTIVE BOX
Patient is a 71y old  Female who presents with a chief complaint of Left knee pain today (08 Jun 2021 17:01)      SUBJECTIVE / OVERNIGHT EVENTS: no events over night     T(C): 36.9 (06-11-21 @ 14:37), Max: 37 (06-11-21 @ 10:27)  HR: 80 (06-11-21 @ 14:37) (80 - 82)  BP: 139/62 (06-11-21 @ 14:37) (139/62 - 158/85)  RR: 17 (06-11-21 @ 14:37) (16 - 17)  SpO2: 94% (06-11-21 @ 14:37) (94% - 95%)      MEDICATIONS  (STANDING):  atorvastatin 10 milliGRAM(s) Oral at bedtime  ceFAZolin   IVPB 2000 milliGRAM(s) IV Intermittent every 6 hours  chlorhexidine 4% Liquid 1 Application(s) Topical daily  DULoxetine 60 milliGRAM(s) Oral daily  heparin   Injectable 5000 Unit(s) SubCutaneous every 8 hours  levothyroxine 25 MICROGram(s) Oral daily    MEDICATIONS  (PRN):  acetaminophen   Tablet .. 650 milliGRAM(s) Oral every 6 hours PRN Temp greater or equal to 38C (100.4F)  morphine  - Injectable 2 milliGRAM(s) IV Push every 6 hours PRN Severe Pain (7 - 10)  oxyCODONE    IR 10 milliGRAM(s) Oral every 4 hours PRN Moderate Pain (4 - 6)          PHYSICAL EXAM:  GENERAL: NAD, well-developed  HEAD:  Atraumatic, Normocephalic  EYES: EOMI, conjunctiva and sclera clear  NECK: Supple, No JVD  CHEST/LUNG: Clear to auscultation bilaterally; No wheeze  HEART: Regular rate and rhythm; No murmurs, rubs, or gallops  ABDOMEN: Soft, Nontender, Nondistended; Bowel sounds present  EXTREMITIES:  Left knee swollen, erythematous   2+ Peripheral Pulses, No clubbing, cyanosis, or edema  PSYCH: AAOx3  NEUROLOGY: non-focal  SKIN: No rashes or lesions                                          11.7   17.56 )-----------( 300      ( 11 Jun 2021 10:22 )             36.9           LIVER FUNCTIONS - ( 11 Jun 2021 10:22 )  Alb: 3.5 g/dL / Pro: 7.6 g/dL / ALK PHOS: 115 U/L / ALT: 18 U/L / AST: 29 U/L / GGT: x           PT/INR - ( 11 Jun 2021 07:17 )   PT: 14.9 sec;   INR: 1.26 ratio         PTT - ( 11 Jun 2021 07:17 )  PTT:31.3 sec  136|95|12<104  3.9|27|0.51  9.4,2.3,3.1  06-11 @ 10:22  136|97|13<119  4.7|26|0.53  9.6,--,--  06-11 @ 07:13          RADIOLOGY & ADDITIONAL TESTS:    Imaging Personally Reviewed:    Consultant(s) Notes Reviewed:      Care Discussed with Consultants/Other Providers:

## 2021-06-12 RX ADMIN — Medication 25 MICROGRAM(S): at 06:49

## 2021-06-12 RX ADMIN — Medication 100 MILLIGRAM(S): at 17:08

## 2021-06-12 RX ADMIN — OXYCODONE HYDROCHLORIDE 10 MILLIGRAM(S): 5 TABLET ORAL at 16:36

## 2021-06-12 RX ADMIN — OXYCODONE HYDROCHLORIDE 10 MILLIGRAM(S): 5 TABLET ORAL at 01:44

## 2021-06-12 RX ADMIN — DULOXETINE HYDROCHLORIDE 60 MILLIGRAM(S): 30 CAPSULE, DELAYED RELEASE ORAL at 11:55

## 2021-06-12 RX ADMIN — OXYCODONE HYDROCHLORIDE 10 MILLIGRAM(S): 5 TABLET ORAL at 11:42

## 2021-06-12 RX ADMIN — CHLORHEXIDINE GLUCONATE 1 APPLICATION(S): 213 SOLUTION TOPICAL at 11:56

## 2021-06-12 RX ADMIN — Medication 100 MILLIGRAM(S): at 22:04

## 2021-06-12 RX ADMIN — HEPARIN SODIUM 5000 UNIT(S): 5000 INJECTION INTRAVENOUS; SUBCUTANEOUS at 10:36

## 2021-06-12 RX ADMIN — ATORVASTATIN CALCIUM 10 MILLIGRAM(S): 80 TABLET, FILM COATED ORAL at 21:52

## 2021-06-12 RX ADMIN — Medication 100 MILLIGRAM(S): at 06:47

## 2021-06-12 RX ADMIN — OXYCODONE HYDROCHLORIDE 10 MILLIGRAM(S): 5 TABLET ORAL at 10:42

## 2021-06-12 RX ADMIN — HEPARIN SODIUM 5000 UNIT(S): 5000 INJECTION INTRAVENOUS; SUBCUTANEOUS at 01:44

## 2021-06-12 RX ADMIN — OXYCODONE HYDROCHLORIDE 10 MILLIGRAM(S): 5 TABLET ORAL at 15:35

## 2021-06-12 RX ADMIN — HEPARIN SODIUM 5000 UNIT(S): 5000 INJECTION INTRAVENOUS; SUBCUTANEOUS at 17:09

## 2021-06-12 RX ADMIN — OXYCODONE HYDROCHLORIDE 10 MILLIGRAM(S): 5 TABLET ORAL at 21:52

## 2021-06-12 RX ADMIN — OXYCODONE HYDROCHLORIDE 10 MILLIGRAM(S): 5 TABLET ORAL at 23:35

## 2021-06-12 RX ADMIN — Medication 100 MILLIGRAM(S): at 10:42

## 2021-06-12 RX ADMIN — OXYCODONE HYDROCHLORIDE 10 MILLIGRAM(S): 5 TABLET ORAL at 02:15

## 2021-06-12 NOTE — PROGRESS NOTE ADULT - SUBJECTIVE AND OBJECTIVE BOX
Orthopaedic Surgery Progress Note    Subjective:   Patient seen and examined. No acute events overnight. Pain well controlled with current regimen. Denies lightheadedness/dizziness, CP/SOB. Tolerating diet. Feeling better after I&D.    Objective:  T(C): 37.3 (06-12-21 @ 08:33), Max: 37.3 (06-12-21 @ 08:33)  HR: 79 (06-12-21 @ 08:33) (79 - 85)  BP: 145/62 (06-12-21 @ 08:33) (123/61 - 158/85)  RR: 18 (06-12-21 @ 08:33) (16 - 18)  SpO2: 95% (06-12-21 @ 08:33) (94% - 97%)  Wt(kg): --    06-11 @ 07:01  -  06-12 @ 07:00  --------------------------------------------------------  IN: 580 mL / OUT: 900 mL / NET: -320 mL        Physical Exam:  Gen: Laying in bed, NAD, alert and oriented.   Resp: Unlabored breathing  Ext: LLE- dressing c/d/i          Hemovac w/ serosanguinous output          EHL/FHL/TA/Sol intact          + SILT DP/SP/FRYE/Sa/T          extremity WWP, DP not palpable due to body habitus                          11.7   17.56 )-----------( 300      ( 11 Jun 2021 10:22 )             36.9     06-11    136  |  95<L>  |  12  ----------------------------<  104<H>  3.9   |  27  |  0.51    Ca    9.4      11 Jun 2021 10:22  Phos  3.1     06-11  Mg     2.3     06-11    TPro  7.6  /  Alb  3.5  /  TBili  0.6  /  DBili  x   /  AST  29  /  ALT  18  /  AlkPhos  115  06-11    PT/INR - ( 11 Jun 2021 07:17 )   PT: 14.9 sec;   INR: 1.26 ratio         PTT - ( 11 Jun 2021 07:17 )  PTT:31.3 sec

## 2021-06-12 NOTE — PROGRESS NOTE ADULT - ASSESSMENT
70yo female b/l lymphedema, morbid obesity, HTN, hypothyrodism, ambulates with walker p/w 2 days L knee pain     Septic Knee   Left knee pain- Reviewed imaging. Ortho consult appreciated.     Recommend MRI Left knee       s/p IR aspiration of Left Knee.    Patient s/p aspiration of Left Knee hemo vac drain in place.   Patient high risk for intermediate risk surgery. Continue with Ancef as per ID.   ID following.   TTE shows Endocardial visualization enhanced with intravenous  injection of Ultrasonic Enhancing Agent (Definity), preserved left ventricular ejection fraction.    Hypothyroidism Continue with Synthroid     HTN Continue with Lisinopril. Hold Norvasc.

## 2021-06-12 NOTE — PROGRESS NOTE ADULT - SUBJECTIVE AND OBJECTIVE BOX
Patient is a 71y old  Female who presents with a chief complaint of Left knee pain today (08 Jun 2021 17:01)      SUBJECTIVE / OVERNIGHT EVENTS: no events over night     T(C): 37.7 (06-12-21 @ 20:40), Max: 37.7 (06-12-21 @ 20:40)  HR: 88 (06-12-21 @ 20:40) (84 - 88)  BP: 117/65 (06-12-21 @ 20:40) (117/65 - 153/67)  RR: 17 (06-12-21 @ 20:40) (17 - 18)  SpO2: 96% (06-12-21 @ 20:40) (96% - 96%)      MEDICATIONS  (STANDING):  atorvastatin 10 milliGRAM(s) Oral at bedtime  ceFAZolin   IVPB 2000 milliGRAM(s) IV Intermittent every 6 hours  chlorhexidine 4% Liquid 1 Application(s) Topical daily  DULoxetine 60 milliGRAM(s) Oral daily  heparin   Injectable 5000 Unit(s) SubCutaneous every 8 hours  levothyroxine 25 MICROGram(s) Oral daily    MEDICATIONS  (PRN):  acetaminophen   Tablet .. 650 milliGRAM(s) Oral every 6 hours PRN Temp greater or equal to 38C (100.4F)  morphine  - Injectable 2 milliGRAM(s) IV Push every 6 hours PRN Severe Pain (7 - 10)  oxyCODONE    IR 10 milliGRAM(s) Oral every 4 hours PRN Moderate Pain (4 - 6)        PHYSICAL EXAM:  GENERAL: NAD, well-developed  HEAD:  Atraumatic, Normocephalic  EYES: EOMI, conjunctiva and sclera clear  NECK: Supple, No JVD  CHEST/LUNG: Clear to auscultation bilaterally; No wheeze  HEART: Regular rate and rhythm; No murmurs, rubs, or gallops  ABDOMEN: Soft, Nontender, Nondistended; Bowel sounds present  EXTREMITIES:  Left knee swollen, erythematous   2+ Peripheral Pulses, No clubbing, cyanosis, or edema  PSYCH: AAOx3  NEUROLOGY: non-focal  SKIN: No rashes or lesions                                          11.7   17.56 )-----------( 300      ( 11 Jun 2021 10:22 )             36.9           LIVER FUNCTIONS - ( 11 Jun 2021 10:22 )  Alb: 3.5 g/dL / Pro: 7.6 g/dL / ALK PHOS: 115 U/L / ALT: 18 U/L / AST: 29 U/L / GGT: x           PT/INR - ( 11 Jun 2021 07:17 )   PT: 14.9 sec;   INR: 1.26 ratio         PTT - ( 11 Jun 2021 07:17 )  PTT:31.3 sec  136|95|12<104  3.9|27|0.51  9.4,2.3,3.1  06-11 @ 10:22  136|97|13<119  4.7|26|0.53  9.6,--,--  06-11 @ 07:13          RADIOLOGY & ADDITIONAL TESTS:    Imaging Personally Reviewed:    Consultant(s) Notes Reviewed:      Care Discussed with Consultants/Other Providers:

## 2021-06-12 NOTE — PROGRESS NOTE ADULT - ASSESSMENT
A/P: 71F POD2 s/p I&D L knee    - Pain control  - Monitor hemovac output  - Antibiotics pending speciation  - DVT ppx per primary team  - Appreciate care per primary team

## 2021-06-12 NOTE — PROGRESS NOTE ADULT - SUBJECTIVE AND OBJECTIVE BOX
S:  pt seen and examined, no complaints, ROS - .     Constitutional: [ ] fevers, [ ] chills.   Skin: [ ] dry skin. [ ] rashes.  Psychiatric: [ ] depression, [ ] anxiety.   Gastrointestinal: [ ] BRBPR, [ ] melena.   Neurological: [ ] confusion. [ ] seizures. [ ] shuffling gait.   Ears,Nose,Mouth and Throat: [ ] ear pain [ ] sore throat.   Eyes: [ ] diplopia.   Respiratory: [ ] hemoptysis. [ ] shortness of breath  Cardiovascular: See HPI above  Hematologic/Lymphatic: [ ] anemia. [ ] painful nodes. [ ] prolonged bleeding.   Genitourinary: [ ] hematuria. [ ] flank pain.   Endocrine: [ ] significant change in weight. [ ] intolerance to heat and cold.     Review of systems [ x] otherwise negative, [ ] otherwise unable to obtain    FH: no family history of sudden cardiac death in first degree relatives    SH: [ ] tobacco, [ ] alcohol, [ ] drugs         acetaminophen   Tablet .. 650 milliGRAM(s) Oral every 6 hours PRN  atorvastatin 10 milliGRAM(s) Oral at bedtime  ceFAZolin   IVPB 2000 milliGRAM(s) IV Intermittent every 6 hours  chlorhexidine 4% Liquid 1 Application(s) Topical daily  DULoxetine 60 milliGRAM(s) Oral daily  heparin   Injectable 5000 Unit(s) SubCutaneous every 8 hours  levothyroxine 25 MICROGram(s) Oral daily  morphine  - Injectable 2 milliGRAM(s) IV Push every 6 hours PRN  oxyCODONE    IR 10 milliGRAM(s) Oral every 4 hours PRN                            11.7   17.56 )-----------( 300      ( 11 Jun 2021 10:22 )             36.9       Hemoglobin: 11.7 g/dL (06-11 @ 10:22)  Hemoglobin: 11.4 g/dL (06-11 @ 07:14)  Hemoglobin: 13.0 g/dL (06-10 @ 07:21)  Hemoglobin: 11.9 g/dL (06-09 @ 03:29)  Hemoglobin: 12.3 g/dL (06-08 @ 10:48)      06-11    136  |  95<L>  |  12  ----------------------------<  104<H>  3.9   |  27  |  0.51    Ca    9.4      11 Jun 2021 10:22  Phos  3.1     06-11  Mg     2.3     06-11    TPro  7.6  /  Alb  3.5  /  TBili  0.6  /  DBili  x   /  AST  29  /  ALT  18  /  AlkPhos  115  06-11    Creatinine Trend: 0.51<--, 0.53<--, 0.61<--, 0.58<--, 0.61<--, 0.64<--    COAGS:           T(C): 37.2 (06-12-21 @ 04:20), Max: 37.2 (06-12-21 @ 04:20)  HR: 83 (06-12-21 @ 04:20) (80 - 85)  BP: 157/82 (06-12-21 @ 04:20) (123/61 - 158/85)  RR: 17 (06-12-21 @ 04:20) (16 - 17)  SpO2: 94% (06-12-21 @ 04:20) (94% - 97%)  Wt(kg): --    I&O's Summary    11 Jun 2021 07:01  -  12 Jun 2021 07:00  --------------------------------------------------------  IN: 580 mL / OUT: 900 mL / NET: -320 mL      Gen: Appears well in NAD  HEENT:  (-)icterus (-)pallor  CV: N S1 S2 1/6 DIETER (+)2 Pulses B/l  Resp:  Clear to auscultation B/L, normal effort  GI: (+) BS Soft, NT, ND  Lymph:  (+) B/L chronic LE Edema, (-)obvious lymphadenopathy  Skin: Warm to touch, Normal turgor  Psych: Appropriate mood and affect    TELEMETRY: None	      ECG: NSR, low voltage QRS     < from: TTE with Doppler (w/Cont) (06.08.21 @ 16:27) >  Conclusions:  Technically difficult study-valves not well visualized.  1. Endocardial visualization enhanced with intravenous  injection of Ultrasonic Enhancing Agent (Definity). Overall  preserved left ventricular ejection fraction. Wall motion  evaluation is limited.  2. No Valves visualized. Unable to exclude valvular  vegetations in the setting of a technically difficult  study.  3. DENIS could be obtained for further evaluation of valvular  vegetations, if clinical suspicion of endocarditis is high.    *** Noprevious Echo exam.    < end of copied text >  	    RADIOLOGY:         CXR: Clear lungs    ASSESSMENT/PLAN: Patient is a 70 y/o female with PMH of b/l lymphedema, morbid obesity, HTN, and hypothyroidism who presented with L knee pain and inability to walk concerning for septic joint and found to have staph aureus bacteremia. Cardiology consulted for preop clearance.     - Tolerated procedure well from CV perspective  - No evidence of clinical HF, anginal symptoms, or unstable cardiac syndromes  - Ortho follow up - s/p L knee I&D  - Abx per ID  - TTE noted difficult study, preserved LVEF

## 2021-06-13 LAB
CULTURE RESULTS: SIGNIFICANT CHANGE UP
CULTURE RESULTS: SIGNIFICANT CHANGE UP
SPECIMEN SOURCE: SIGNIFICANT CHANGE UP
SPECIMEN SOURCE: SIGNIFICANT CHANGE UP

## 2021-06-13 RX ADMIN — DULOXETINE HYDROCHLORIDE 60 MILLIGRAM(S): 30 CAPSULE, DELAYED RELEASE ORAL at 11:46

## 2021-06-13 RX ADMIN — CHLORHEXIDINE GLUCONATE 1 APPLICATION(S): 213 SOLUTION TOPICAL at 11:46

## 2021-06-13 RX ADMIN — OXYCODONE HYDROCHLORIDE 10 MILLIGRAM(S): 5 TABLET ORAL at 16:14

## 2021-06-13 RX ADMIN — Medication 25 MICROGRAM(S): at 06:05

## 2021-06-13 RX ADMIN — Medication 100 MILLIGRAM(S): at 16:16

## 2021-06-13 RX ADMIN — Medication 100 MILLIGRAM(S): at 06:05

## 2021-06-13 RX ADMIN — HEPARIN SODIUM 5000 UNIT(S): 5000 INJECTION INTRAVENOUS; SUBCUTANEOUS at 11:44

## 2021-06-13 RX ADMIN — OXYCODONE HYDROCHLORIDE 10 MILLIGRAM(S): 5 TABLET ORAL at 06:52

## 2021-06-13 RX ADMIN — ATORVASTATIN CALCIUM 10 MILLIGRAM(S): 80 TABLET, FILM COATED ORAL at 22:15

## 2021-06-13 RX ADMIN — Medication 100 MILLIGRAM(S): at 11:44

## 2021-06-13 RX ADMIN — OXYCODONE HYDROCHLORIDE 10 MILLIGRAM(S): 5 TABLET ORAL at 12:45

## 2021-06-13 RX ADMIN — OXYCODONE HYDROCHLORIDE 10 MILLIGRAM(S): 5 TABLET ORAL at 06:13

## 2021-06-13 RX ADMIN — OXYCODONE HYDROCHLORIDE 10 MILLIGRAM(S): 5 TABLET ORAL at 11:43

## 2021-06-13 RX ADMIN — HEPARIN SODIUM 5000 UNIT(S): 5000 INJECTION INTRAVENOUS; SUBCUTANEOUS at 02:37

## 2021-06-13 RX ADMIN — Medication 100 MILLIGRAM(S): at 22:07

## 2021-06-13 RX ADMIN — HEPARIN SODIUM 5000 UNIT(S): 5000 INJECTION INTRAVENOUS; SUBCUTANEOUS at 17:37

## 2021-06-13 RX ADMIN — OXYCODONE HYDROCHLORIDE 10 MILLIGRAM(S): 5 TABLET ORAL at 17:36

## 2021-06-13 NOTE — PROGRESS NOTE ADULT - SUBJECTIVE AND OBJECTIVE BOX
S:  pt seen and examined, no chest pain or sob on exam     Constitutional: [ ] fevers, [ ] chills.   Skin: [ ] dry skin. [ ] rashes.  Psychiatric: [ ] depression, [ ] anxiety.   Gastrointestinal: [ ] BRBPR, [ ] melena.   Neurological: [ ] confusion. [ ] seizures. [ ] shuffling gait.   Ears,Nose,Mouth and Throat: [ ] ear pain [ ] sore throat.   Eyes: [ ] diplopia.   Respiratory: [ ] hemoptysis. [ ] shortness of breath  Cardiovascular: See HPI above  Hematologic/Lymphatic: [ ] anemia. [ ] painful nodes. [ ] prolonged bleeding.   Genitourinary: [ ] hematuria. [ ] flank pain.   Endocrine: [ ] significant change in weight. [ ] intolerance to heat and cold.     Review of systems [ x] otherwise negative, [ ] otherwise unable to obtain    FH: no family history of sudden cardiac death in first degree relatives    SH: [ ] tobacco, [ ] alcohol, [ ] drugs         acetaminophen   Tablet .. 650 milliGRAM(s) Oral every 6 hours PRN  atorvastatin 10 milliGRAM(s) Oral at bedtime  ceFAZolin   IVPB 2000 milliGRAM(s) IV Intermittent every 6 hours  chlorhexidine 4% Liquid 1 Application(s) Topical daily  DULoxetine 60 milliGRAM(s) Oral daily  heparin   Injectable 5000 Unit(s) SubCutaneous every 8 hours  levothyroxine 25 MICROGram(s) Oral daily  morphine  - Injectable 2 milliGRAM(s) IV Push every 6 hours PRN  oxyCODONE    IR 10 milliGRAM(s) Oral every 4 hours PRN                            11.7   17.56 )-----------( 300      ( 11 Jun 2021 10:22 )             36.9       Hemoglobin: 11.7 g/dL (06-11 @ 10:22)  Hemoglobin: 11.4 g/dL (06-11 @ 07:14)  Hemoglobin: 13.0 g/dL (06-10 @ 07:21)  Hemoglobin: 11.9 g/dL (06-09 @ 03:29)  Hemoglobin: 12.3 g/dL (06-08 @ 10:48)      06-11    136  |  95<L>  |  12  ----------------------------<  104<H>  3.9   |  27  |  0.51    Ca    9.4      11 Jun 2021 10:22  Phos  3.1     06-11  Mg     2.3     06-11    TPro  7.6  /  Alb  3.5  /  TBili  0.6  /  DBili  x   /  AST  29  /  ALT  18  /  AlkPhos  115  06-11    Creatinine Trend: 0.51<--, 0.53<--, 0.61<--, 0.58<--, 0.61<--, 0.64<--    COAGS:           T(C): 37.6 (06-13-21 @ 04:29), Max: 37.7 (06-12-21 @ 20:40)  HR: 80 (06-13-21 @ 04:29) (80 - 88)  BP: 137/75 (06-13-21 @ 04:29) (117/65 - 153/67)  RR: 17 (06-13-21 @ 04:29) (17 - 18)  SpO2: 94% (06-13-21 @ 04:29) (94% - 96%)  Wt(kg): --    I&O's Summary    12 Jun 2021 07:01  -  13 Jun 2021 07:00  --------------------------------------------------------  IN: 1284 mL / OUT: 3910 mL / NET: -2626 mL           Gen: Appears well in NAD  HEENT:  (-)icterus (-)pallor  CV: N S1 S2 1/6 DIETER (+)2 Pulses B/l  Resp:  Clear to auscultation B/L, normal effort  GI: (+) BS Soft, NT, ND  Lymph:  (+) B/L chronic LE Edema, (-)obvious lymphadenopathy  Skin: Warm to touch, Normal turgor  Psych: Appropriate mood and affect    TELEMETRY: None	      ECG: NSR, low voltage QRS     < from: TTE with Doppler (w/Cont) (06.08.21 @ 16:27) >  Conclusions:  Technically difficult study-valves not well visualized.  1. Endocardial visualization enhanced with intravenous  injection of Ultrasonic Enhancing Agent (Definity). Overall  preserved left ventricular ejection fraction. Wall motion  evaluation is limited.  2. No Valves visualized. Unable to exclude valvular  vegetations in the setting of a technically difficult  study.  3. DENIS could be obtained for further evaluation of valvular  vegetations, if clinical suspicion of endocarditis is high.    *** Noprevious Echo exam.    < end of copied text >  	    RADIOLOGY:         CXR: Clear lungs    ASSESSMENT/PLAN: Patient is a 70 y/o female with PMH of b/l lymphedema, morbid obesity, HTN, and hypothyroidism who presented with L knee pain and inability to walk concerning for septic joint and found to have staph aureus bacteremia. Cardiology consulted for preop clearance.     - Tolerated procedure well from CV perspective  - No evidence of clinical HF, anginal symptoms, or unstable cardiac syndromes  - Ortho follow up - s/p L knee I&D  - Abx per ID  - TTE noted difficult study, preserved LVEF

## 2021-06-13 NOTE — PROGRESS NOTE ADULT - ASSESSMENT
70yo female b/l lymphedema, morbid obesity, HTN, hypothyrodism, ambulates with walker p/w 2 days L knee pain     Septic Knee   Left knee pain- Reviewed imaging. Ortho consult appreciated.     Recommend MRI Left knee       s/p IR aspiration of Left Knee.    Patient s/p aspiration of Left Knee hemo vac drain removed today.    Patient high risk for intermediate risk surgery. Continue with Ancef as per ID.   ID following.   TTE shows Endocardial visualization enhanced with intravenous  injection of Ultrasonic Enhancing Agent (Definity), preserved left ventricular ejection fraction.    Hypothyroidism Continue with Synthroid     HTN Continue with Lisinopril. Hold Norvasc.

## 2021-06-13 NOTE — PHYSICAL THERAPY INITIAL EVALUATION ADULT - MD ORDER
Arthritis, septic, knee 10-Jorge-2021 17:06:12  Burton Pereyra.    PROCEDURES: Irrigation and debridement of left knee 10-Jorge-2021 17:05:45  Burton Pereyra.   Operative Findings: Irrigation and debridement of left knee performed. Gross purulence encountered in knee joint. Normal saline with polymixin irrigated through joint. Large hemovac placed in knee.

## 2021-06-13 NOTE — PHYSICAL THERAPY INITIAL EVALUATION ADULT - PERTINENT HX OF CURRENT PROBLEM, REHAB EVAL
70yo female b/l lymphedema, morbid obesity, HTN, hypothyrodism, ambulates with walker p/w 2 days L knee pain POD 3 s/p I&D L knee. Critical Value Notification: body fluid 06/08 /21-rare staphylococcus Aureus 70yo female b/l lymphedema, morbid obesity (BMI 74), HTN, hypothyrodism, ambulates with walker p/w 2 days L knee pain POD 3 s/p I&D L knee. Critical Value Notification: body fluid 06/08 /21-rare staphylococcus Aureus

## 2021-06-13 NOTE — PROGRESS NOTE ADULT - SUBJECTIVE AND OBJECTIVE BOX
Patient is a 71y old  Female who presents with a chief complaint of Left knee pain today (08 Jun 2021 17:01)      SUBJECTIVE / OVERNIGHT EVENTS: no events over night     T(C): 36.9 (06-13-21 @ 21:26), Max: 36.9 (06-13-21 @ 21:26)  HR: 80 (06-13-21 @ 21:26) (80 - 84)  BP: 127/68 (06-13-21 @ 21:26) (127/68 - 137/77)  RR: 17 (06-13-21 @ 21:26) (17 - 17)  SpO2: 97% (06-13-21 @ 21:26) (96% - 97%)      MEDICATIONS  (STANDING):  atorvastatin 10 milliGRAM(s) Oral at bedtime  ceFAZolin   IVPB 2000 milliGRAM(s) IV Intermittent every 6 hours  chlorhexidine 4% Liquid 1 Application(s) Topical daily  DULoxetine 60 milliGRAM(s) Oral daily  heparin   Injectable 5000 Unit(s) SubCutaneous every 8 hours  levothyroxine 25 MICROGram(s) Oral daily    MEDICATIONS  (PRN):  acetaminophen   Tablet .. 650 milliGRAM(s) Oral every 6 hours PRN Temp greater or equal to 38C (100.4F)  morphine  - Injectable 2 milliGRAM(s) IV Push every 6 hours PRN Severe Pain (7 - 10)  oxyCODONE    IR 10 milliGRAM(s) Oral every 4 hours PRN Moderate Pain (4 - 6)    PHYSICAL EXAM:  GENERAL: NAD, well-developed  HEAD:  Atraumatic, Normocephalic  EYES: EOMI, conjunctiva and sclera clear  NECK: Supple, No JVD  CHEST/LUNG: Clear to auscultation bilaterally; No wheeze  HEART: Regular rate and rhythm; No murmurs, rubs, or gallops  ABDOMEN: Soft, Nontender, Nondistended; Bowel sounds present  EXTREMITIES:  Left knee swollen, erythematous   2+ Peripheral Pulses, No clubbing, cyanosis, or edema  PSYCH: AAOx3  NEUROLOGY: non-focal  SKIN: No rashes or lesions                                    no new labs         RADIOLOGY & ADDITIONAL TESTS:    Imaging Personally Reviewed:    Consultant(s) Notes Reviewed:      Care Discussed with Consultants/Other Providers:

## 2021-06-13 NOTE — PROGRESS NOTE ADULT - SUBJECTIVE AND OBJECTIVE BOX
Orthopaedic Surgery Progress Note    Subjective:   Patient seen and examined. No acute events overnight. Pain well controlled with current regimen. Denies lightheadedness/dizziness, CP/SOB. Tolerating diet. Feeling better after I&D. Drain removed.    Objective:  T(C): 36.8 (06-13-21 @ 11:42), Max: 37.7 (06-12-21 @ 20:40)  HR: 84 (06-13-21 @ 11:42) (80 - 88)  BP: 137/77 (06-13-21 @ 11:42) (117/65 - 137/77)  RR: 17 (06-13-21 @ 11:42) (17 - 17)  SpO2: 96% (06-13-21 @ 11:42) (94% - 96%)  Wt(kg): --    06-12 @ 07:01  -  06-13 @ 07:00  --------------------------------------------------------  IN: 1284 mL / OUT: 3910 mL / NET: -2626 mL        Physical Exam:  Gen: Laying in bed, NAD, alert and oriented.   Resp: Unlabored breathing  Ext: LLE- dressing c/d/i          Hemovac w/ serosanguinous output          EHL/FHL/TA/Sol intact          + SILT DP/SP/FRYE/Sa/T          extremity WWP, DP not palpable due to body habitus

## 2021-06-13 NOTE — PROGRESS NOTE ADULT - ASSESSMENT
A/P: 71F POD3 s/p I&D L knee    - Pain control  - Hemovac d/c'd  - Antibiotics pending speciation  - DVT ppx per primary team  - Appreciate care per primary team

## 2021-06-13 NOTE — PHYSICAL THERAPY INITIAL EVALUATION ADULT - ACTIVE RANGE OF MOTION EXAMINATION, REHAB EVAL
2/2 to severe lymphodema pt is unable to move b/l LE (except ankle and toes) in supine./emi. upper extremity Active ROM was WNL (within normal limits)/deficits as listed below

## 2021-06-13 NOTE — PHYSICAL THERAPY INITIAL EVALUATION ADULT - ADDITIONAL COMMENTS
Pt states she lives with a friend who is her health care aid in a private home with 5 steps to enter with rail, then 1 step off porch into home and 6 steps inside for which she has a chair lift. Pt is morbidly obese (BMI 74) with severe lymphodema of B/L legs and has not left her home since 2016. Pt has a physician come visit her. Pt sleeps in a recliner in her living room. Pt reports being able to ambulate with a RW 30ft to bathroom  and then another 45ft to shower and then back to living room. Pt spends most of the day in recliner.

## 2021-06-13 NOTE — PHYSICAL THERAPY INITIAL EVALUATION ADULT - GENERAL OBSERVATIONS, REHAB EVAL
Pt received seated in chair position in bed in NAD, +IV Lock, +primafit. Pt AOx4, pleasant and cooperative.

## 2021-06-14 LAB
ANION GAP SERPL CALC-SCNC: 15 MMOL/L — SIGNIFICANT CHANGE UP (ref 5–17)
BUN SERPL-MCNC: 11 MG/DL — SIGNIFICANT CHANGE UP (ref 7–23)
CALCIUM SERPL-MCNC: 9.5 MG/DL — SIGNIFICANT CHANGE UP (ref 8.4–10.5)
CHLORIDE SERPL-SCNC: 91 MMOL/L — LOW (ref 96–108)
CO2 SERPL-SCNC: 26 MMOL/L — SIGNIFICANT CHANGE UP (ref 22–31)
CREAT SERPL-MCNC: 0.51 MG/DL — SIGNIFICANT CHANGE UP (ref 0.5–1.3)
GLUCOSE SERPL-MCNC: 120 MG/DL — HIGH (ref 70–99)
HCT VFR BLD CALC: 37.1 % — SIGNIFICANT CHANGE UP (ref 34.5–45)
HGB BLD-MCNC: 11.6 G/DL — SIGNIFICANT CHANGE UP (ref 11.5–15.5)
MCHC RBC-ENTMCNC: 28.9 PG — SIGNIFICANT CHANGE UP (ref 27–34)
MCHC RBC-ENTMCNC: 31.3 GM/DL — LOW (ref 32–36)
MCV RBC AUTO: 92.3 FL — SIGNIFICANT CHANGE UP (ref 80–100)
NRBC # BLD: 0 /100 WBCS — SIGNIFICANT CHANGE UP (ref 0–0)
PLATELET # BLD AUTO: 339 K/UL — SIGNIFICANT CHANGE UP (ref 150–400)
POTASSIUM SERPL-MCNC: 4.6 MMOL/L — SIGNIFICANT CHANGE UP (ref 3.5–5.3)
POTASSIUM SERPL-SCNC: 4.6 MMOL/L — SIGNIFICANT CHANGE UP (ref 3.5–5.3)
RBC # BLD: 4.02 M/UL — SIGNIFICANT CHANGE UP (ref 3.8–5.2)
RBC # FLD: 13.5 % — SIGNIFICANT CHANGE UP (ref 10.3–14.5)
SODIUM SERPL-SCNC: 132 MMOL/L — LOW (ref 135–145)
WBC # BLD: 21.41 K/UL — HIGH (ref 3.8–10.5)
WBC # FLD AUTO: 21.41 K/UL — HIGH (ref 3.8–10.5)

## 2021-06-14 PROCEDURE — 99232 SBSQ HOSP IP/OBS MODERATE 35: CPT

## 2021-06-14 RX ORDER — PANTOPRAZOLE SODIUM 20 MG/1
40 TABLET, DELAYED RELEASE ORAL
Refills: 0 | Status: DISCONTINUED | OUTPATIENT
Start: 2021-06-14 | End: 2021-06-23

## 2021-06-14 RX ADMIN — Medication 100 MILLIGRAM(S): at 16:20

## 2021-06-14 RX ADMIN — PANTOPRAZOLE SODIUM 40 MILLIGRAM(S): 20 TABLET, DELAYED RELEASE ORAL at 23:22

## 2021-06-14 RX ADMIN — OXYCODONE HYDROCHLORIDE 10 MILLIGRAM(S): 5 TABLET ORAL at 17:17

## 2021-06-14 RX ADMIN — OXYCODONE HYDROCHLORIDE 10 MILLIGRAM(S): 5 TABLET ORAL at 07:11

## 2021-06-14 RX ADMIN — OXYCODONE HYDROCHLORIDE 10 MILLIGRAM(S): 5 TABLET ORAL at 10:57

## 2021-06-14 RX ADMIN — Medication 100 MILLIGRAM(S): at 03:50

## 2021-06-14 RX ADMIN — HEPARIN SODIUM 5000 UNIT(S): 5000 INJECTION INTRAVENOUS; SUBCUTANEOUS at 17:18

## 2021-06-14 RX ADMIN — OXYCODONE HYDROCHLORIDE 10 MILLIGRAM(S): 5 TABLET ORAL at 00:12

## 2021-06-14 RX ADMIN — ATORVASTATIN CALCIUM 10 MILLIGRAM(S): 80 TABLET, FILM COATED ORAL at 22:12

## 2021-06-14 RX ADMIN — OXYCODONE HYDROCHLORIDE 10 MILLIGRAM(S): 5 TABLET ORAL at 00:56

## 2021-06-14 RX ADMIN — OXYCODONE HYDROCHLORIDE 10 MILLIGRAM(S): 5 TABLET ORAL at 16:20

## 2021-06-14 RX ADMIN — Medication 25 MICROGRAM(S): at 06:15

## 2021-06-14 RX ADMIN — Medication 100 MILLIGRAM(S): at 10:58

## 2021-06-14 RX ADMIN — CHLORHEXIDINE GLUCONATE 1 APPLICATION(S): 213 SOLUTION TOPICAL at 11:22

## 2021-06-14 RX ADMIN — OXYCODONE HYDROCHLORIDE 10 MILLIGRAM(S): 5 TABLET ORAL at 23:22

## 2021-06-14 RX ADMIN — HEPARIN SODIUM 5000 UNIT(S): 5000 INJECTION INTRAVENOUS; SUBCUTANEOUS at 09:58

## 2021-06-14 RX ADMIN — OXYCODONE HYDROCHLORIDE 10 MILLIGRAM(S): 5 TABLET ORAL at 06:17

## 2021-06-14 RX ADMIN — DULOXETINE HYDROCHLORIDE 60 MILLIGRAM(S): 30 CAPSULE, DELAYED RELEASE ORAL at 11:22

## 2021-06-14 RX ADMIN — HEPARIN SODIUM 5000 UNIT(S): 5000 INJECTION INTRAVENOUS; SUBCUTANEOUS at 01:04

## 2021-06-14 RX ADMIN — Medication 100 MILLIGRAM(S): at 22:09

## 2021-06-14 RX ADMIN — OXYCODONE HYDROCHLORIDE 10 MILLIGRAM(S): 5 TABLET ORAL at 12:00

## 2021-06-14 RX ADMIN — OXYCODONE HYDROCHLORIDE 10 MILLIGRAM(S): 5 TABLET ORAL at 22:08

## 2021-06-14 NOTE — PROGRESS NOTE ADULT - ASSESSMENT
70yo female b/l lymphedema, morbid obesity, HTN, hypothyrodism, ambulates with walker p/w 2 days L knee pain preventing her from walking today. Pt found to have MSSA bacteremia c/b septic left knee      MSSA Bacteremia, leucocytosis, lt knee septic arthritis: s/p wash out   -c/w Cefazolin at 2g q6   -repeat Bcx 6/8 NTD x 2  -TTE: Valves not well visualized, cannot rule out vegetation. Will need DENIS.   -Ortho on board   -trend CBC for leucocytosis, continues to be elevated but pt clinically improved.   - will eventually need PICC         Plan discussed with Medicine NP     Christiano [place]  Pager: [phone]. If no response or past 5 pm call [phone].

## 2021-06-14 NOTE — PROGRESS NOTE ADULT - SUBJECTIVE AND OBJECTIVE BOX
S: Denies chest pain or SOB. Review of systems otherwise (-)    Constitutional: [ ] fevers, [ ] chills.   Skin: [ ] dry skin. [ ] rashes.  Psychiatric: [ ] depression, [ ] anxiety.   Gastrointestinal: [ ] BRBPR, [ ] melena.   Neurological: [ ] confusion. [ ] seizures. [ ] shuffling gait.   Ears,Nose,Mouth and Throat: [ ] ear pain [ ] sore throat.   Eyes: [ ] diplopia.   Respiratory: [ ] hemoptysis. [ ] shortness of breath  Cardiovascular: See HPI above  Hematologic/Lymphatic: [ ] anemia. [ ] painful nodes. [ ] prolonged bleeding.   Genitourinary: [ ] hematuria. [ ] flank pain.   Endocrine: [ ] significant change in weight. [ ] intolerance to heat and cold.     Review of systems [ x] otherwise negative, [ ] otherwise unable to obtain    FH: no family history of sudden cardiac death in first degree relatives    SH: [ ] tobacco, [ ] alcohol, [ ] drugs      MEDICATIONS  (STANDING):  atorvastatin 10 milliGRAM(s) Oral at bedtime  ceFAZolin   IVPB 2000 milliGRAM(s) IV Intermittent every 6 hours  chlorhexidine 4% Liquid 1 Application(s) Topical daily  DULoxetine 60 milliGRAM(s) Oral daily  heparin   Injectable 5000 Unit(s) SubCutaneous every 8 hours  levothyroxine 25 MICROGram(s) Oral daily    MEDICATIONS  (PRN):  acetaminophen   Tablet .. 650 milliGRAM(s) Oral every 6 hours PRN Temp greater or equal to 38C (100.4F)  morphine  - Injectable 2 milliGRAM(s) IV Push every 6 hours PRN Severe Pain (7 - 10)  oxyCODONE    IR 10 milliGRAM(s) Oral every 4 hours PRN Moderate Pain (4 - 6)      LABS:                          11.6   21.41 )-----------( 339      ( 14 Jun 2021 06:36 )             37.1     Hemoglobin: 11.6 g/dL (06-14 @ 06:36)  Hemoglobin: 11.7 g/dL (06-11 @ 10:22)  Hemoglobin: 11.4 g/dL (06-11 @ 07:14)  Hemoglobin: 13.0 g/dL (06-10 @ 07:21)    06-14    132<L>  |  91<L>  |  11  ----------------------------<  120<H>  4.6   |  26  |  0.51    Ca    9.5      14 Jun 2021 06:36      Creatinine Trend: 0.51<--, 0.51<--, 0.53<--, 0.61<--, 0.58<--, 0.61<--             06-13-21 @ 07:01  -  06-14-21 @ 07:00  --------------------------------------------------------  IN: 1130 mL / OUT: 2000 mL / NET: -870 mL        PHYSICAL EXAM  Vital Signs Last 24 Hrs  T(C): 37 (14 Jun 2021 14:07), Max: 37 (14 Jun 2021 14:07)  T(F): 98.6 (14 Jun 2021 14:07), Max: 98.6 (14 Jun 2021 14:07)  HR: 91 (14 Jun 2021 14:07) (80 - 91)  BP: 144/79 (14 Jun 2021 14:07) (127/68 - 144/79)  BP(mean): --  RR: 17 (14 Jun 2021 14:07) (17 - 17)  SpO2: 95% (14 Jun 2021 14:07) (95% - 97%)      Gen: Appears well in NAD  HEENT:  (-)icterus (-)pallor  CV: N S1 S2 1/6 DIETER (+)2 Pulses B/l  Resp:  Clear to auscultation B/L, normal effort  GI: (+) BS Soft, NT, ND  Lymph:  (+) B/L chronic LE Edema, (-)obvious lymphadenopathy  Skin: Warm to touch, Normal turgor  Psych: Appropriate mood and affect    TELEMETRY: None	      ECG: NSR, low voltage QRS     < from: TTE with Doppler (w/Cont) (06.08.21 @ 16:27) >  Conclusions:  Technically difficult study-valves not well visualized.  1. Endocardial visualization enhanced with intravenous  injection of Ultrasonic Enhancing Agent (Definity). Overall  preserved left ventricular ejection fraction. Wall motion  evaluation is limited.  2. No Valves visualized. Unable to exclude valvular  vegetations in the setting of a technically difficult  study.  3. DENIS could be obtained for further evaluation of valvular  vegetations, if clinical suspicion of endocarditis is high.    *** Noprevious Echo exam.    < end of copied text >  	    RADIOLOGY:         CXR: Clear lungs    ASSESSMENT/PLAN: Patient is a 70 y/o female with PMH of b/l lymphedema, morbid obesity, HTN, and hypothyroidism who presented with L knee pain and inability to walk concerning for septic joint and found to have staph aureus bacteremia. Cardiology consulted for preop clearance.     - No evidence of clinical HF or anginal symptoms  - Ortho follow up - s/p L knee I&D  - Abx per ID, f/u recs to see if DENIS still needed, if so will need anesthesia eval given morbid obesity  - TTE noted difficult study, preserved LVEF     Paras Perez PA-C  Pager: [phone]

## 2021-06-14 NOTE — PROGRESS NOTE ADULT - SUBJECTIVE AND OBJECTIVE BOX
Patient is a 71y old  Female who presents with a chief complaint of Left knee pain today (08 Jun 2021 17:01)      SUBJECTIVE / OVERNIGHT EVENTS: no events over night     T(C): 37 (06-14-21 @ 14:07), Max: 37 (06-14-21 @ 14:07)  HR: 91 (06-14-21 @ 14:07) (91 - 91)  BP: 144/79 (06-14-21 @ 14:07) (144/79 - 144/79)  RR: 17 (06-14-21 @ 14:07) (17 - 17)  SpO2: 95% (06-14-21 @ 14:07) (95% - 95%)      MEDICATIONS  (STANDING):  atorvastatin 10 milliGRAM(s) Oral at bedtime  ceFAZolin   IVPB 2000 milliGRAM(s) IV Intermittent every 6 hours  chlorhexidine 4% Liquid 1 Application(s) Topical daily  DULoxetine 60 milliGRAM(s) Oral daily  heparin   Injectable 5000 Unit(s) SubCutaneous every 8 hours  levothyroxine 25 MICROGram(s) Oral daily    MEDICATIONS  (PRN):  acetaminophen   Tablet .. 650 milliGRAM(s) Oral every 6 hours PRN Temp greater or equal to 38C (100.4F)  morphine  - Injectable 2 milliGRAM(s) IV Push every 6 hours PRN Severe Pain (7 - 10)  oxyCODONE    IR 10 milliGRAM(s) Oral every 4 hours PRN Moderate Pain (4 - 6)          PHYSICAL EXAM:  GENERAL: NAD, well-developed  HEAD:  Atraumatic, Normocephalic  EYES: EOMI, conjunctiva and sclera clear  NECK: Supple, No JVD  CHEST/LUNG: Clear to auscultation bilaterally; No wheeze  HEART: Regular rate and rhythm; No murmurs, rubs, or gallops  ABDOMEN: Soft, Nontender, Nondistended; Bowel sounds present  EXTREMITIES:  Left knee swollen, erythematous   2+ Peripheral Pulses, No clubbing, cyanosis, or edema  PSYCH: AAOx3  NEUROLOGY: non-focal  SKIN: No rashes or lesions                                                   11.6   21.41 )-----------( 339      ( 14 Jun 2021 06:36 )             37.1               132|91|11<120  4.6|26|0.51  9.5,--,--  06-14 @ 06:36    RADIOLOGY & ADDITIONAL TESTS:    Imaging Personally Reviewed:    Consultant(s) Notes Reviewed:      Care Discussed with Consultants/Other Providers:

## 2021-06-14 NOTE — PROGRESS NOTE ADULT - ASSESSMENT
72yo female b/l lymphedema, morbid obesity, HTN, hypothyrodism, ambulates with walker p/w 2 days L knee pain     Septic Knee   Left knee pain- Reviewed imaging. Ortho consult appreciated.     Recommend MRI Left knee       s/p IR aspiration of Left Knee.    Patient s/p aspiration of Left Knee hemo vac drain removed.    Patient high risk for intermediate risk surgery. Continue with Ancef as per ID.   ID following.   TTE shows Endocardial visualization enhanced with intravenous  injection of Ultrasonic Enhancing Agent (Definity), preserved left ventricular ejection fraction.  Plan for DENIS     Hypothyroidism Continue with Synthroid     HTN Continue with Lisinopril. Hold Norvasc.

## 2021-06-14 NOTE — PROGRESS NOTE ADULT - SUBJECTIVE AND OBJECTIVE BOX
Ortho Progress Note    S: Patient seen and examined. No acute events overnight. Pain well controlled with current regimen. Denies lightheadedness/dizziness, CP/SOB. Tolerating diet. Yesterday hemovac drain was d/c'd.      O:  Physical Exam:  Gen: Laying in bed, NAD, alert and oriented.   Resp: Unlabored breathing  Ext: LLE- dressing c/d/i          EHL/FHL/TA/Sol intact          + SILT DP/SP/FRYE/Sa/T          +DP, extremity WWP    Vital Signs Last 24 Hrs  T(C): 36.4 (14 Jun 2021 04:51), Max: 36.9 (13 Jun 2021 21:26)  T(F): 97.6 (14 Jun 2021 04:51), Max: 98.5 (13 Jun 2021 21:26)  HR: 85 (14 Jun 2021 04:51) (80 - 85)  BP: 143/79 (14 Jun 2021 04:51) (127/68 - 143/79)  BP(mean): --  RR: 17 (14 Jun 2021 04:51) (17 - 17)  SpO2: 97% (14 Jun 2021 04:51) (96% - 97%)

## 2021-06-14 NOTE — PROGRESS NOTE ADULT - SUBJECTIVE AND OBJECTIVE BOX
71y old  Female who presents with a chief complaint of Left knee pain today (14 Jun 2021 14:55)      Interval history:  Afebrile, denies any new or worsening back pain. Knee pain improved but persists.       Allergies:   No Known Allergies    Antimicrobials:  ceFAZolin   IVPB 2000 milliGRAM(s) IV Intermittent every 6 hours      REVIEW OF SYSTEMS:  No chest pain   No SOB  No N/V  No rash.       Vital Signs Last 24 Hrs  T(C): 37 (06-14-21 @ 14:07), Max: 37 (06-14-21 @ 14:07)  T(F): 98.6 (06-14-21 @ 14:07), Max: 98.6 (06-14-21 @ 14:07)  HR: 91 (06-14-21 @ 14:07) (80 - 91)  BP: 144/79 (06-14-21 @ 14:07) (127/68 - 144/79)  BP(mean): --  RR: 17 (06-14-21 @ 14:07) (17 - 17)  SpO2: 95% (06-14-21 @ 14:07) (95% - 97%)      PHYSICAL EXAM:  Gen: morbidly obese, non-toxic,   Lungs: CTA B/L,   Heart: S1 S2 normal  Abdomen: soft, non-ttp  Ext: chronic b/l lymphedema, chronic venous changes   Skin: no rashes                          11.6   21.41 )-----------( 339      ( 14 Jun 2021 06:36 )             37.1   06-14    132<L>  |  91<L>  |  11  ----------------------------<  120<H>  4.6   |  26  |  0.51    Ca    9.5      14 Jun 2021 06:36

## 2021-06-14 NOTE — PROGRESS NOTE ADULT - ASSESSMENT
Patient seen and examined, agree with above assessment and plan as transcribed above.    - f/u DENIS Caceres MD, [facility]  BEEPER [phone]

## 2021-06-14 NOTE — PROGRESS NOTE ADULT - ASSESSMENT
A/P: 71F POD4 s/p I&D L knee    - Pain control  - Hemovac d/c'd  - Antibiotics pending speciation  - DVT ppx per primary team  - Appreciate care per primary team

## 2021-06-15 LAB
ANION GAP SERPL CALC-SCNC: 15 MMOL/L — SIGNIFICANT CHANGE UP (ref 5–17)
APTT BLD: 28.8 SEC — SIGNIFICANT CHANGE UP (ref 27.5–35.5)
BLD GP AB SCN SERPL QL: NEGATIVE — SIGNIFICANT CHANGE UP
BUN SERPL-MCNC: 12 MG/DL — SIGNIFICANT CHANGE UP (ref 7–23)
CALCIUM SERPL-MCNC: 9.6 MG/DL — SIGNIFICANT CHANGE UP (ref 8.4–10.5)
CHLORIDE SERPL-SCNC: 90 MMOL/L — LOW (ref 96–108)
CO2 SERPL-SCNC: 25 MMOL/L — SIGNIFICANT CHANGE UP (ref 22–31)
CREAT SERPL-MCNC: 0.51 MG/DL — SIGNIFICANT CHANGE UP (ref 0.5–1.3)
GLUCOSE SERPL-MCNC: 126 MG/DL — HIGH (ref 70–99)
HCT VFR BLD CALC: 37.9 % — SIGNIFICANT CHANGE UP (ref 34.5–45)
HGB BLD-MCNC: 11.8 G/DL — SIGNIFICANT CHANGE UP (ref 11.5–15.5)
INR BLD: 1.26 RATIO — HIGH (ref 0.88–1.16)
LACTATE SERPL-SCNC: 0.9 MMOL/L — SIGNIFICANT CHANGE UP (ref 0.7–2)
MCHC RBC-ENTMCNC: 28.4 PG — SIGNIFICANT CHANGE UP (ref 27–34)
MCHC RBC-ENTMCNC: 31.1 GM/DL — LOW (ref 32–36)
MCV RBC AUTO: 91.1 FL — SIGNIFICANT CHANGE UP (ref 80–100)
NRBC # BLD: 0 /100 WBCS — SIGNIFICANT CHANGE UP (ref 0–0)
PLATELET # BLD AUTO: 431 K/UL — HIGH (ref 150–400)
POTASSIUM SERPL-MCNC: 4.3 MMOL/L — SIGNIFICANT CHANGE UP (ref 3.5–5.3)
POTASSIUM SERPL-SCNC: 4.3 MMOL/L — SIGNIFICANT CHANGE UP (ref 3.5–5.3)
PROTHROM AB SERPL-ACNC: 15 SEC — HIGH (ref 10.6–13.6)
RBC # BLD: 4.16 M/UL — SIGNIFICANT CHANGE UP (ref 3.8–5.2)
RBC # FLD: 13.7 % — SIGNIFICANT CHANGE UP (ref 10.3–14.5)
RH IG SCN BLD-IMP: POSITIVE — SIGNIFICANT CHANGE UP
SODIUM SERPL-SCNC: 130 MMOL/L — LOW (ref 135–145)
WBC # BLD: 23.1 K/UL — HIGH (ref 3.8–10.5)
WBC # FLD AUTO: 23.1 K/UL — HIGH (ref 3.8–10.5)

## 2021-06-15 PROCEDURE — 99233 SBSQ HOSP IP/OBS HIGH 50: CPT | Mod: GC

## 2021-06-15 RX ADMIN — OXYCODONE HYDROCHLORIDE 10 MILLIGRAM(S): 5 TABLET ORAL at 16:51

## 2021-06-15 RX ADMIN — ATORVASTATIN CALCIUM 10 MILLIGRAM(S): 80 TABLET, FILM COATED ORAL at 22:42

## 2021-06-15 RX ADMIN — Medication 100 MILLIGRAM(S): at 10:59

## 2021-06-15 RX ADMIN — Medication 100 MILLIGRAM(S): at 22:42

## 2021-06-15 RX ADMIN — OXYCODONE HYDROCHLORIDE 10 MILLIGRAM(S): 5 TABLET ORAL at 22:41

## 2021-06-15 RX ADMIN — OXYCODONE HYDROCHLORIDE 10 MILLIGRAM(S): 5 TABLET ORAL at 12:00

## 2021-06-15 RX ADMIN — HEPARIN SODIUM 5000 UNIT(S): 5000 INJECTION INTRAVENOUS; SUBCUTANEOUS at 10:59

## 2021-06-15 RX ADMIN — Medication 25 MICROGRAM(S): at 06:14

## 2021-06-15 RX ADMIN — OXYCODONE HYDROCHLORIDE 10 MILLIGRAM(S): 5 TABLET ORAL at 12:24

## 2021-06-15 RX ADMIN — CHLORHEXIDINE GLUCONATE 1 APPLICATION(S): 213 SOLUTION TOPICAL at 11:00

## 2021-06-15 RX ADMIN — Medication 100 MILLIGRAM(S): at 06:15

## 2021-06-15 RX ADMIN — HEPARIN SODIUM 5000 UNIT(S): 5000 INJECTION INTRAVENOUS; SUBCUTANEOUS at 17:22

## 2021-06-15 RX ADMIN — OXYCODONE HYDROCHLORIDE 10 MILLIGRAM(S): 5 TABLET ORAL at 17:32

## 2021-06-15 RX ADMIN — Medication 100 MILLIGRAM(S): at 17:23

## 2021-06-15 RX ADMIN — PANTOPRAZOLE SODIUM 40 MILLIGRAM(S): 20 TABLET, DELAYED RELEASE ORAL at 06:15

## 2021-06-15 RX ADMIN — DULOXETINE HYDROCHLORIDE 60 MILLIGRAM(S): 30 CAPSULE, DELAYED RELEASE ORAL at 11:00

## 2021-06-15 RX ADMIN — OXYCODONE HYDROCHLORIDE 10 MILLIGRAM(S): 5 TABLET ORAL at 23:30

## 2021-06-15 RX ADMIN — HEPARIN SODIUM 5000 UNIT(S): 5000 INJECTION INTRAVENOUS; SUBCUTANEOUS at 01:58

## 2021-06-15 NOTE — PROGRESS NOTE ADULT - SUBJECTIVE AND OBJECTIVE BOX
S: Denies chest pain or SOB. Review of systems otherwise (-)    Constitutional: [ ] fevers, [ ] chills.   Skin: [ ] dry skin. [ ] rashes.  Psychiatric: [ ] depression, [ ] anxiety.   Gastrointestinal: [ ] BRBPR, [ ] melena.   Neurological: [ ] confusion. [ ] seizures. [ ] shuffling gait.   Ears,Nose,Mouth and Throat: [ ] ear pain [ ] sore throat.   Eyes: [ ] diplopia.   Respiratory: [ ] hemoptysis. [ ] shortness of breath  Cardiovascular: See HPI above  Hematologic/Lymphatic: [ ] anemia. [ ] painful nodes. [ ] prolonged bleeding.   Genitourinary: [ ] hematuria. [ ] flank pain.   Endocrine: [ ] significant change in weight. [ ] intolerance to heat and cold.     Review of systems [ x] otherwise negative, [ ] otherwise unable to obtain    FH: no family history of sudden cardiac death in first degree relatives    SH: [ ] tobacco, [ ] alcohol, [ ] drugs      MEDICATIONS  (STANDING):  atorvastatin 10 milliGRAM(s) Oral at bedtime  ceFAZolin   IVPB 2000 milliGRAM(s) IV Intermittent every 6 hours  chlorhexidine 4% Liquid 1 Application(s) Topical daily  DULoxetine 60 milliGRAM(s) Oral daily  heparin   Injectable 5000 Unit(s) SubCutaneous every 8 hours  levothyroxine 25 MICROGram(s) Oral daily  pantoprazole    Tablet 40 milliGRAM(s) Oral before breakfast    MEDICATIONS  (PRN):  acetaminophen   Tablet .. 650 milliGRAM(s) Oral every 6 hours PRN Temp greater or equal to 38C (100.4F)  morphine  - Injectable 2 milliGRAM(s) IV Push every 6 hours PRN Severe Pain (7 - 10)  oxyCODONE    IR 10 milliGRAM(s) Oral every 4 hours PRN Moderate Pain (4 - 6)      LABS:                          11.8   23.10 )-----------( 431      ( 15 Jorge 2021 07:15 )             37.9     Hemoglobin: 11.8 g/dL (06-15 @ 07:15)  Hemoglobin: 11.6 g/dL (06-14 @ 06:36)  Hemoglobin: 11.7 g/dL (06-11 @ 10:22)  Hemoglobin: 11.4 g/dL (06-11 @ 07:14)    06-15    130<L>  |  90<L>  |  12  ----------------------------<  126<H>  4.3   |  25  |  0.51    Ca    9.6      15 Jorge 2021 07:15      Creatinine Trend: 0.51<--, 0.51<--, 0.51<--, 0.53<--, 0.61<--, 0.58<--   PT/INR - ( 15 Jorge 2021 07:15 )   PT: 15.0 sec;   INR: 1.26 ratio         PTT - ( 15 Jorge 2021 07:15 )  PTT:28.8 sec          06-14-21 @ 07:01  -  06-15-21 @ 07:00  --------------------------------------------------------  IN: 0 mL / OUT: 300 mL / NET: -300 mL    06-15-21 @ 07:01  -  06-15-21 @ 15:11  --------------------------------------------------------  IN: 480 mL / OUT: 1300 mL / NET: -820 mL        PHYSICAL EXAM  Vital Signs Last 24 Hrs  T(C): 37.2 (15 Jorge 2021 13:26), Max: 37.7 (14 Jun 2021 20:39)  T(F): 98.9 (15 Jorge 2021 13:26), Max: 99.8 (14 Jun 2021 20:39)  HR: 80 (15 Jorge 2021 13:26) (79 - 97)  BP: 115/80 (15 Jorge 2021 13:26) (115/80 - 160/80)  BP(mean): --  RR: 18 (15 Jorge 2021 13:26) (18 - 18)  SpO2: 95% (15 Jorge 2021 13:26) (93% - 95%)        Gen: Appears well in NAD  HEENT:  (-)icterus (-)pallor  CV: N S1 S2 1/6 DIETER (+)2 Pulses B/l  Resp:  Clear to auscultation B/L, normal effort  GI: (+) BS Soft, NT, ND  Lymph:  (+) B/L chronic LE Edema, (-)obvious lymphadenopathy  Skin: Warm to touch, Normal turgor  Psych: Appropriate mood and affect    TELEMETRY: None	      ECG: NSR, low voltage QRS     < from: TTE with Doppler (w/Cont) (06.08.21 @ 16:27) >  Conclusions:  Technically difficult study-valves not well visualized.  1. Endocardial visualization enhanced with intravenous  injection of Ultrasonic Enhancing Agent (Definity). Overall  preserved left ventricular ejection fraction. Wall motion  evaluation is limited.  2. No Valves visualized. Unable to exclude valvular  vegetations in the setting of a technically difficult  study.  3. DENIS could be obtained for further evaluation of valvular  vegetations, if clinical suspicion of endocarditis is high.    *** Noprevious Echo exam.    < end of copied text >  	    RADIOLOGY:         CXR: Clear lungs    ASSESSMENT/PLAN: Patient is a 72 y/o female with PMH of b/l lymphedema, morbid obesity, HTN, and hypothyroidism who presented with L knee pain and inability to walk concerning for septic joint and found to have staph aureus bacteremia. Cardiology consulted for preop clearance.     - No evidence of clinical HF or anginal symptoms  - Ortho follow up - s/p L knee I&D  - Abx per ID  - TTE noted difficult study, preserved LVEF   - Plan for DENIS in OR - f/u timing per echo dept/OR    Paras Perez PA-C  Pager: [phone]

## 2021-06-15 NOTE — PROGRESS NOTE ADULT - ASSESSMENT
A/P: 71F POD4 s/p I&D L knee    - Pain control  - Hemovac d/c'd  - Antibiotics pending speciation  - DVT ppx per primary team  -Patient needs PICC  -FU DENIS and FU repeat cultures   - Appreciate care per primary team  -Nothing further planned from ortho at this time.   -Pt can fu outpatient when safe for Dispo  -Call office office for appt

## 2021-06-15 NOTE — PROGRESS NOTE ADULT - ASSESSMENT
make and go to all appointments, and call your doctor if you are having problems. It's also a good idea to know your test results and keep a list of the medicines you take. How can you care for yourself at home? Diet    · Use less salt when you cook and eat. This helps lower your blood pressure. Taste food before salting. Add only a little salt when you think you need it. With time, your taste buds will adjust to less salt.     · Eat fewer snack items, fast foods, canned soups, and other high-salt, high-fat, processed foods.     · Read food labels and try to avoid saturated and trans fats. They increase your risk of heart disease by raising cholesterol levels.     · Limit the amount of solid fat-butter, margarine, and shortening-you eat. Use olive, peanut, or canola oil when you cook. Bake, broil, and steam foods instead of frying them.     · Eating fish can lower your risk for heart disease. Eat at least 2 servings of fish a week. [place], mackerel, herring, sardines, and chunk light tuna are very good choices. These fish contain omega-3 fatty acids.     · Eat a variety of fruit and vegetables every day. Dark green, deep orange, red, or yellow fruits and vegetables are especially good for you. Examples include spinach, carrots, peaches, and berries.     · Foods high in fiber can reduce your cholesterol and provide important vitamins and minerals. High-fiber foods include whole-grain cereals and breads, oatmeal, beans, brown rice, citrus fruits, and apples.     · Limit drinks and foods with added sugar. These include candy, desserts, and soda pop.    Lifestyle changes    · If your doctor recommends it, get more exercise. Walking is a good choice. Bit by bit, increase the amount you walk every day. Try for at least 30 minutes on most days of the week. You also may want to swim, bike, or do other activities.     · Do not smoke. If you need help quitting, talk to your doctor about stop-smoking programs and medicines.   70yo female b/l lymphedema, morbid obesity, HTN, hypothyrodism, ambulates with walker p/w 2 days L knee pain preventing her from walking today. Pt found to have MSSA bacteremia c/b septic left knee      MSSA Bacteremia, leucocytosis, lt knee septic arthritis: s/p wash out   -c/w Cefazolin at 2g q6   -repeat Bcx 6/8 NTD x 2  -TTE: Valves not well visualized, cannot rule out vegetation. Will need DENIS.   -Pt has point tenderness on physical exam of thoarcolumbar junction c/f possible metastatic foci of MSSA infection. Would recommend CT imaging of T and L spine with contrast if possible, will discuss with Dr Sainz prior to making formal recommendation  -Ortho on board   -trend CBC for leucocytosis, continues to be elevated but pt clinically improved.   - will eventually need PICC            ask your healthcare professional. [place] [address] any warranty or liability for your use of this information.   70yo female b/l lymphedema, morbid obesity, HTN, hypothyrodism, ambulates with walker p/w 2 days L knee pain preventing her from walking today. Pt found to have MSSA bacteremia c/b septic left knee      MSSA Bacteremia, leucocytosis, lt knee septic arthritis: s/p wash out   -c/w Cefazolin at 2g q6   -repeat Bcx 6/8 NTD x 2  -TTE: Valves not well visualized, cannot rule out vegetation. Will need DENIS.   -Pt has point tenderness on physical exam of thoarcolumbar junction c/f possible metastatic foci of MSSA infection. Would recommend CT imaging of T and L spine with contrast if possible, will discuss with Dr Sainz prior to making formal recommendation  -Ortho on board   -trend CBC for leucocytosis, continues to be elevated but pt clinically improved.   - will eventually need PICC         Discussed recommendations with medicine NP

## 2021-06-15 NOTE — PROGRESS NOTE ADULT - SUBJECTIVE AND OBJECTIVE BOX
Patient is a 71y old  Female who presents with a chief complaint of Left knee pain today (08 Jun 2021 17:01)      SUBJECTIVE / OVERNIGHT EVENTS: no events over night     T(C): 37.6 (06-15-21 @ 20:45), Max: 37.6 (06-15-21 @ 20:45)  HR: 82 (06-15-21 @ 20:45) (80 - 82)  BP: 133/73 (06-15-21 @ 20:45) (115/80 - 133/73)  RR: 18 (06-15-21 @ 20:45) (18 - 18)  SpO2: 95% (06-15-21 @ 20:45) (95% - 95%)        MEDICATIONS  (STANDING):  atorvastatin 10 milliGRAM(s) Oral at bedtime  ceFAZolin   IVPB 2000 milliGRAM(s) IV Intermittent every 6 hours  chlorhexidine 4% Liquid 1 Application(s) Topical daily  DULoxetine 60 milliGRAM(s) Oral daily  heparin   Injectable 5000 Unit(s) SubCutaneous every 8 hours  levothyroxine 25 MICROGram(s) Oral daily  pantoprazole    Tablet 40 milliGRAM(s) Oral before breakfast    MEDICATIONS  (PRN):  acetaminophen   Tablet .. 650 milliGRAM(s) Oral every 6 hours PRN Temp greater or equal to 38C (100.4F)  morphine  - Injectable 2 milliGRAM(s) IV Push every 6 hours PRN Severe Pain (7 - 10)  oxyCODONE    IR 10 milliGRAM(s) Oral every 4 hours PRN Moderate Pain (4 - 6)        PHYSICAL EXAM:  GENERAL: NAD, well-developed  HEAD:  Atraumatic, Normocephalic  EYES: EOMI, conjunctiva and sclera clear  NECK: Supple, No JVD  CHEST/LUNG: Clear to auscultation bilaterally; No wheeze  HEART: Regular rate and rhythm; No murmurs, rubs, or gallops  ABDOMEN: Soft, Nontender, Nondistended; Bowel sounds present  EXTREMITIES:  Left knee swollen, erythematous   2+ Peripheral Pulses, No clubbing, cyanosis, or edema  PSYCH: AAOx3  NEUROLOGY: non-focal  SKIN: No rashes or lesions                                         11.8   23.10 )-----------( 431      ( 15 Jorge 2021 07:15 )             37.9             PT/INR - ( 15 Jorge 2021 07:15 )   PT: 15.0 sec;   INR: 1.26 ratio         PTT - ( 15 Jorge 2021 07:15 )  PTT:28.8 sec  130|90|12<126  4.3|25|0.51  9.6,--,--  06-15 @ 07:15      CAPILLARY BLOOD GLUCOSE          RADIOLOGY & ADDITIONAL TESTS:    Imaging Personally Reviewed:    Consultant(s) Notes Reviewed:      Care Discussed with Consultants/Other Providers:

## 2021-06-15 NOTE — PROGRESS NOTE ADULT - SUBJECTIVE AND OBJECTIVE BOX
Ortho Progress Note    S: Patient seen and examined. No acute events overnight. Pain well controlled with current regimen. Denies lightheadedness/dizziness, CP/SOB. Tolerating diet. Yesterday hemovac drain was d/c'd.      O:  Physical Exam:  Gen: Laying in bed, NAD, alert and oriented.   Resp: Unlabored breathing  Ext: LLE- dressing c/d/i          EHL/FHL/TA/Sol intact          + SILT DP/SP/FRYE/Sa/T          +DP, extremity WWP    Vital Signs Last 24 Hrs  T(C): 37.1 (15 Jorge 2021 05:32), Max: 37.7 (14 Jun 2021 20:39)  T(F): 98.7 (15 Jorge 2021 05:32), Max: 99.8 (14 Jun 2021 20:39)  HR: 79 (15 Jorge 2021 05:32) (79 - 97)  BP: 160/80 (15 Jorge 2021 05:32) (144/79 - 160/80)  BP(mean): --  RR: 18 (15 Jorge 2021 05:32) (17 - 18)  SpO2: 95% (15 Jorge 2021 05:32) (93% - 95%)

## 2021-06-15 NOTE — PROGRESS NOTE ADULT - SUBJECTIVE AND OBJECTIVE BOX
Follow Up:  MSSA BSI, Left knee septic joint    Inverval History/ROS:Patient is a 71y old  Female who presents with a chief complaint of Left knee pain today (15 Jorge 2021 09:02)    Afebrile, WBC slightly uptrending since OR washout. Knee pain somewhat improved but still painful. Denies fever, chills, diarrhea, dysuria.     Allergies    No Known Allergies    Intolerances        ANTIMICROBIALS:  ceFAZolin   IVPB 2000 every 6 hours      OTHER MEDS:  acetaminophen   Tablet .. 650 milliGRAM(s) Oral every 6 hours PRN  atorvastatin 10 milliGRAM(s) Oral at bedtime  chlorhexidine 4% Liquid 1 Application(s) Topical daily  DULoxetine 60 milliGRAM(s) Oral daily  heparin   Injectable 5000 Unit(s) SubCutaneous every 8 hours  levothyroxine 25 MICROGram(s) Oral daily  morphine  - Injectable 2 milliGRAM(s) IV Push every 6 hours PRN  oxyCODONE    IR 10 milliGRAM(s) Oral every 4 hours PRN  pantoprazole    Tablet 40 milliGRAM(s) Oral before breakfast      Vital Signs Last 24 Hrs  T(C): 37.2 (15 Jorge 2021 13:26), Max: 37.7 (14 Jun 2021 20:39)  T(F): 98.9 (15 Jorge 2021 13:26), Max: 99.8 (14 Jun 2021 20:39)  HR: 80 (15 Jorge 2021 13:26) (79 - 97)  BP: 115/80 (15 Jorge 2021 13:26) (115/80 - 160/80)  BP(mean): --  RR: 18 (15 Jorge 2021 13:26) (18 - 18)  SpO2: 95% (15 Jorge 2021 13:26) (93% - 95%)    Physical Exam  Gen: morbidly obese, non-toxic, tearful  Lungs: CTA B/L, no w/r/r  Heart: RRR, no m/r/g  Back: TTP at thoracolumbar junction  Abdomen: soft, non-ttp  Ext: b/l lymphedema, left knee with dressing c/d/i  Skin: no rashes                          11.8   23.10 )-----------( 431      ( 15 Jorge 2021 07:15 )             37.9       06-15    130<L>  |  90<L>  |  12  ----------------------------<  126<H>  4.3   |  25  |  0.51    Ca    9.6      15 Jorge 2021 07:15            MICROBIOLOGY:Culture Results:   Rare Staphylococcus aureus (06-08 @ 22:17)  Culture Results:   No Growth Final (06-08 @ 14:36)  Culture Results:   No Growth Final (06-08 @ 14:36)  Culture - Blood (06.07.21 @ 00:33)   - Clindamycin: S <=0.25   - Erythromycin: S <=0.25   - Gentamicin: S <=1 Should not be used as monotherapy   - Ampicillin/Sulbactam: S <=8/4   Gram Stain:   Growth in aerobic bottle: Gram Positive Cocci in Clusters   Growth in anaerobic bottle: Gram Positive Cocci in Clusters   - Staphylococcus aureus: Detec Any isolate of Staphylococcus aureus from a blood culture is NOT considered a contaminant.   - Oxacillin: S 0.5   - Penicillin: R >8   - Cefazolin: S <=4   - RIF- Rifampin: S <=1 Should not be used as monotherapy   - Tetra/Doxy: S <=1   - Trimethoprim/Sulfamethoxazole: S <=0.5/9.5   - Vancomycin: S 1   Specimen Source: .Blood Blood-Venous   Organism: Blood Culture PCR   Organism: Staphylococcus aureus   Culture Results:   Growth in aerobic and anaerobic bottles: Staphylococcus aureus   ***Blood Panel PCR results on this specimen are available   approximately 3 hours after the Gram stain result.***   Gram stain, PCR, and/or culture results may not always   correspond dueto difference in methodologies.   ************************************************************   This PCR assay was performed by multiplex PCR. This   Assay tests for 66 bacterial and resistance gene targets.   Please refer to the [facility] test directory   at [email or web address] for details.   Organism Identification: Blood Culture PCR   Staphylococcus aureus   Method Type: PCR   Method Type: CORTES         RADIOLOGY:     Follow Up:  MSSA BSI, Left knee septic joint    Inverval History/ROS:Patient is a 71y old  Female who presents with a chief complaint of Left knee pain today (15 Jorge 2021 09:02)    Afebrile, WBC slightly uptrending since OR washout. Knee pain somewhat improved but still painful. Denies fever, chills, diarrhea, dysuria.       Allergies  No Known Allergies      ANTIMICROBIALS:  ceFAZolin   IVPB 2000 every 6 hours      Vital Signs Last 24 Hrs  T(C): 37.2 (15 Jorge 2021 13:26), Max: 37.7 (14 Jun 2021 20:39)  T(F): 98.9 (15 Jorge 2021 13:26), Max: 99.8 (14 Jun 2021 20:39)  HR: 80 (15 Jorge 2021 13:26) (79 - 97)  BP: 115/80 (15 Jorge 2021 13:26) (115/80 - 160/80)  BP(mean): --  RR: 18 (15 Jorge 2021 13:26) (18 - 18)  SpO2: 95% (15 Jorge 2021 13:26) (93% - 95%)      Physical Exam  Gen: morbidly obese, non-toxic, tearful  Lungs: CTA B/L  Heart: RRR,   Back: TTP at thoracolumbar junction  Abdomen: soft, non-ttp  Ext: b/l lymphedema, left knee with dressing c/d/i  Skin: no rashes                          11.8   23.10 )-----------( 431      ( 15 Jorge 2021 07:15 )             37.9       06-15    130<L>  |  90<L>  |  12  ----------------------------<  126<H>  4.3   |  25  |  0.51    Ca    9.6      15 Jorge 2021 07:15            MICROBIOLOGY:Culture Results:   Rare Staphylococcus aureus (06-08 @ 22:17)  Culture Results:   No Growth Final (06-08 @ 14:36)  Culture Results:   No Growth Final (06-08 @ 14:36)  Culture - Blood (06.07.21 @ 00:33)   - Clindamycin: S <=0.25   - Erythromycin: S <=0.25   - Gentamicin: S <=1 Should not be used as monotherapy   - Ampicillin/Sulbactam: S <=8/4   Gram Stain:   Growth in aerobic bottle: Gram Positive Cocci in Clusters   Growth in anaerobic bottle: Gram Positive Cocci in Clusters   - Staphylococcus aureus: Detec Any isolate of Staphylococcus aureus from a blood culture is NOT considered a contaminant.   - Oxacillin: S 0.5   - Penicillin: R >8   - Cefazolin: S <=4   - RIF- Rifampin: S <=1 Should not be used as monotherapy   - Tetra/Doxy: S <=1   - Trimethoprim/Sulfamethoxazole: S <=0.5/9.5   - Vancomycin: S 1   Specimen Source: .Blood Blood-Venous   Organism: Blood Culture PCR   Organism: Staphylococcus aureus   Culture Results:   Growth in aerobic and anaerobic bottles: Staphylococcus aureus   ***Blood Panel PCR results on this specimen are available   approximately 3 hours after the Gram stain result.***   Gram stain, PCR, and/or culture results may not always   correspond dueto difference in methodologies.   ************************************************************   This PCR assay was performed by multiplex PCR. This   Assay tests for 66 bacterial and resistance gene targets.   Please refer to the [facility] test directory   at [email or web address] for details.   Organism Identification: Blood Culture PCR   Staphylococcus aureus   Method Type: PCR   Method Type: CORTES

## 2021-06-16 LAB
ANION GAP SERPL CALC-SCNC: 13 MMOL/L — SIGNIFICANT CHANGE UP (ref 5–17)
BUN SERPL-MCNC: 13 MG/DL — SIGNIFICANT CHANGE UP (ref 7–23)
CALCIUM SERPL-MCNC: 9.4 MG/DL — SIGNIFICANT CHANGE UP (ref 8.4–10.5)
CHLORIDE SERPL-SCNC: 91 MMOL/L — LOW (ref 96–108)
CO2 SERPL-SCNC: 27 MMOL/L — SIGNIFICANT CHANGE UP (ref 22–31)
CREAT SERPL-MCNC: 0.57 MG/DL — SIGNIFICANT CHANGE UP (ref 0.5–1.3)
GLUCOSE SERPL-MCNC: 123 MG/DL — HIGH (ref 70–99)
HCT VFR BLD CALC: 35.9 % — SIGNIFICANT CHANGE UP (ref 34.5–45)
HGB BLD-MCNC: 11.3 G/DL — LOW (ref 11.5–15.5)
MCHC RBC-ENTMCNC: 28.6 PG — SIGNIFICANT CHANGE UP (ref 27–34)
MCHC RBC-ENTMCNC: 31.5 GM/DL — LOW (ref 32–36)
MCV RBC AUTO: 90.9 FL — SIGNIFICANT CHANGE UP (ref 80–100)
NRBC # BLD: 0 /100 WBCS — SIGNIFICANT CHANGE UP (ref 0–0)
PLATELET # BLD AUTO: 410 K/UL — HIGH (ref 150–400)
POTASSIUM SERPL-MCNC: 4.4 MMOL/L — SIGNIFICANT CHANGE UP (ref 3.5–5.3)
POTASSIUM SERPL-SCNC: 4.4 MMOL/L — SIGNIFICANT CHANGE UP (ref 3.5–5.3)
RBC # BLD: 3.95 M/UL — SIGNIFICANT CHANGE UP (ref 3.8–5.2)
RBC # FLD: 13.6 % — SIGNIFICANT CHANGE UP (ref 10.3–14.5)
SODIUM SERPL-SCNC: 131 MMOL/L — LOW (ref 135–145)
WBC # BLD: 19.77 K/UL — HIGH (ref 3.8–10.5)
WBC # FLD AUTO: 19.77 K/UL — HIGH (ref 3.8–10.5)

## 2021-06-16 PROCEDURE — 72132 CT LUMBAR SPINE W/DYE: CPT | Mod: 26

## 2021-06-16 PROCEDURE — 93325 DOPPLER ECHO COLOR FLOW MAPG: CPT | Mod: 26

## 2021-06-16 PROCEDURE — 93312 ECHO TRANSESOPHAGEAL: CPT | Mod: 26

## 2021-06-16 PROCEDURE — 72129 CT CHEST SPINE W/DYE: CPT | Mod: 26

## 2021-06-16 PROCEDURE — 99232 SBSQ HOSP IP/OBS MODERATE 35: CPT | Mod: GC

## 2021-06-16 PROCEDURE — 93320 DOPPLER ECHO COMPLETE: CPT | Mod: 26

## 2021-06-16 PROCEDURE — 76376 3D RENDER W/INTRP POSTPROCES: CPT | Mod: 26

## 2021-06-16 RX ORDER — OXYCODONE HYDROCHLORIDE 5 MG/1
10 TABLET ORAL EVERY 4 HOURS
Refills: 0 | Status: DISCONTINUED | OUTPATIENT
Start: 2021-06-16 | End: 2021-06-23

## 2021-06-16 RX ADMIN — OXYCODONE HYDROCHLORIDE 10 MILLIGRAM(S): 5 TABLET ORAL at 14:57

## 2021-06-16 RX ADMIN — OXYCODONE HYDROCHLORIDE 10 MILLIGRAM(S): 5 TABLET ORAL at 20:53

## 2021-06-16 RX ADMIN — OXYCODONE HYDROCHLORIDE 10 MILLIGRAM(S): 5 TABLET ORAL at 02:58

## 2021-06-16 RX ADMIN — OXYCODONE HYDROCHLORIDE 10 MILLIGRAM(S): 5 TABLET ORAL at 03:30

## 2021-06-16 RX ADMIN — PANTOPRAZOLE SODIUM 40 MILLIGRAM(S): 20 TABLET, DELAYED RELEASE ORAL at 05:11

## 2021-06-16 RX ADMIN — OXYCODONE HYDROCHLORIDE 10 MILLIGRAM(S): 5 TABLET ORAL at 14:43

## 2021-06-16 RX ADMIN — OXYCODONE HYDROCHLORIDE 10 MILLIGRAM(S): 5 TABLET ORAL at 10:53

## 2021-06-16 RX ADMIN — Medication 25 MICROGRAM(S): at 05:10

## 2021-06-16 RX ADMIN — OXYCODONE HYDROCHLORIDE 10 MILLIGRAM(S): 5 TABLET ORAL at 12:30

## 2021-06-16 RX ADMIN — Medication 100 MILLIGRAM(S): at 05:10

## 2021-06-16 RX ADMIN — HEPARIN SODIUM 5000 UNIT(S): 5000 INJECTION INTRAVENOUS; SUBCUTANEOUS at 18:41

## 2021-06-16 RX ADMIN — ATORVASTATIN CALCIUM 10 MILLIGRAM(S): 80 TABLET, FILM COATED ORAL at 20:53

## 2021-06-16 RX ADMIN — Medication 100 MILLIGRAM(S): at 23:17

## 2021-06-16 RX ADMIN — CHLORHEXIDINE GLUCONATE 1 APPLICATION(S): 213 SOLUTION TOPICAL at 12:18

## 2021-06-16 RX ADMIN — Medication 100 MILLIGRAM(S): at 12:17

## 2021-06-16 RX ADMIN — HEPARIN SODIUM 5000 UNIT(S): 5000 INJECTION INTRAVENOUS; SUBCUTANEOUS at 01:59

## 2021-06-16 RX ADMIN — HEPARIN SODIUM 5000 UNIT(S): 5000 INJECTION INTRAVENOUS; SUBCUTANEOUS at 12:17

## 2021-06-16 RX ADMIN — OXYCODONE HYDROCHLORIDE 10 MILLIGRAM(S): 5 TABLET ORAL at 21:30

## 2021-06-16 RX ADMIN — DULOXETINE HYDROCHLORIDE 60 MILLIGRAM(S): 30 CAPSULE, DELAYED RELEASE ORAL at 12:18

## 2021-06-16 RX ADMIN — Medication 100 MILLIGRAM(S): at 17:50

## 2021-06-16 NOTE — PRE-ANESTHESIA EVALUATION ADULT - NSANTHPMHFT_GEN_ALL_CORE
chart + id/med/cv notes reviewed. super morbid obesity. no hx cv/pulm dz, no signs active cad/chf. recent tte with nl lv fxn. ekg noted. remote hx cspine fusion with ?bone fusion?
Denies any past cardiovascular or respiratory issues. However, mobility limited due to morbid obesity. Denies any current chest pain or shortness of breath. Denies asthma, COPD. Able to lay flat without SOB or reflux.

## 2021-06-16 NOTE — PROGRESS NOTE ADULT - SUBJECTIVE AND OBJECTIVE BOX
Follow Up: MSSA BSI, Septic L native joint    Inverval History/ROS:Patient is a 71y old  Female who presents with a chief complaint of Left knee pain today (16 Jun 2021 12:54)    No interval events. Afebrile, WBC downtrending. Pt states knee pain and mobility very slightly improved. Denies back pain, fevers, chills, SOB.    Allergies    No Known Allergies    Intolerances        ANTIMICROBIALS:  ceFAZolin   IVPB 2000 every 6 hours      OTHER MEDS:  acetaminophen   Tablet .. 650 milliGRAM(s) Oral every 6 hours PRN  atorvastatin 10 milliGRAM(s) Oral at bedtime  chlorhexidine 4% Liquid 1 Application(s) Topical daily  DULoxetine 60 milliGRAM(s) Oral daily  heparin   Injectable 5000 Unit(s) SubCutaneous every 8 hours  levothyroxine 25 MICROGram(s) Oral daily  oxyCODONE    IR 10 milliGRAM(s) Oral every 4 hours PRN  pantoprazole    Tablet 40 milliGRAM(s) Oral before breakfast      Vital Signs Last 24 Hrs  T(C): 36.9 (16 Jun 2021 14:45), Max: 37.6 (15 Jorge 2021 20:45)  T(F): 98.5 (16 Jun 2021 14:45), Max: 99.7 (15 Jorge 2021 20:45)  HR: 79 (16 Jun 2021 14:45) (79 - 82)  BP: 149/67 (16 Jun 2021 14:45) (133/73 - 155/63)  BP(mean): --  RR: 18 (16 Jun 2021 14:45) (18 - 18)  SpO2: 95% (16 Jun 2021 14:45) (94% - 95%)    Physical Exam  Gen: morbidly obese  Lungs: CTA B/L, no w/r/r  Heart: RRR, no m/r/g  Back: no longer able to elicit significant TTP at T-L spine junction  Abdomen: soft, non-ttp  Ext: b/l lympedema, chronic venous changes, still significant ROM limitations of LLE  Skin: LLE skin discoloration, no evidence of cellulitis                          11.3   19.77 )-----------( 410      ( 16 Jun 2021 07:11 )             35.9       06-16    131<L>  |  91<L>  |  13  ----------------------------<  123<H>  4.4   |  27  |  0.57    Ca    9.4      16 Jun 2021 07:11            MICROBIOLOGY:Culture Results:   No growth to date. (06-15 @ 09:09)  Culture Results:   No growth to date. (06-15 @ 09:09)  Culture Results:   No growth to date. (06-14 @ 21:03)  Culture Results:   No growth to date. (06-14 @ 21:03)  Culture - Body Fluid with Gram Stain (06.08.21 @ 22:17)   - Gentamicin: S <=1 Should not be used as monotherapy   - Ampicillin/Sulbactam: S <=8/4   - Oxacillin: S 0.5   - Penicillin: R >8   - RIF- Rifampin: S <=1 Should not be used as monotherapy   - Tetra/Doxy: S <=1   - Trimethoprim/Sulfamethoxazole: S <=0.5/9.5   - Vancomycin: S 1   Gram Stain:   No polymorphonuclear cells seen   No organisms seen   by cytocentrifuge   - Cefazolin: S <=4   - Clindamycin: S <=0.25   - Erythromycin: S <=0.25   Specimen Source: .Body Fluid Synovial Fluid   Culture Results:   Rare Staphylococcus aureus   Organism Identification: Staphylococcus aureus   Organism: Staphylococcus aureus   Method Type: CORTES Culture - Urine (06.07.21 @ 06:27)   - Gentamicin: S <=2   - Tobramycin: S <=2   - Piperacillin/Tazobactam: S 16   - Aztreonam: S <=4   - Imipenem: S <=1   - Levofloxacin: S <=0.5   - Meropenem: S <=1   - Amikacin: S <=16   - Cefepime: S <=2   - Ceftazidime: S 4   - Ciprofloxacin: S <=0.25   Specimen Source: .Urine Clean Catch (Midstream)   Culture Results:   10,000 - 49,000 CFU/mL Pseudomonas aeruginosa   <10,000 CFU/ml Normal Urogenital armen present   Organism Identification: Pseudomonas aeruginosa   Organism: Pseudomonas aeruginosa   Method Type: CORTES     RADIOLOGY:  < from: CT Lumbar Spine w/ IV Cont (06.16.21 @ 12:12) >      IMPRESSION:    No endplate erosive changes to suggest discitis or osteomyelitis. No evidence of abnormal enhancement within the spinal canal. Of note, MRI is a more sensitive evaluation for infection of the vertebral bodies, disc spaces and spinal canal, if there is clinical concern.    Posterior disc osteophyte complexes at L2/L3 through L5/S1 result in bilateral foraminal stenoses at these levels. No high-grade central canal compromise is identified.    < end of copied text >     Follow Up: MSSA BSI, Septic L native joint    Inverval History/ROS:Patient is a 71y old  Female who presents with a chief complaint of Left knee pain today (16 Jun 2021 12:54)  No interval events. Afebrile, WBC downtrending. Pt states knee pain and mobility very slightly improved. Denies back pain, fevers, chills, SOB.      Allergies  No Known Allergies          ANTIMICROBIALS:  ceFAZolin   IVPB 2000 every 6 hours      OTHER MEDS:  acetaminophen   Tablet .. 650 milliGRAM(s) Oral every 6 hours PRN  atorvastatin 10 milliGRAM(s) Oral at bedtime  chlorhexidine 4% Liquid 1 Application(s) Topical daily  DULoxetine 60 milliGRAM(s) Oral daily  heparin   Injectable 5000 Unit(s) SubCutaneous every 8 hours  levothyroxine 25 MICROGram(s) Oral daily  oxyCODONE    IR 10 milliGRAM(s) Oral every 4 hours PRN  pantoprazole    Tablet 40 milliGRAM(s) Oral before breakfast      Vital Signs Last 24 Hrs  T(C): 36.9 (16 Jun 2021 14:45), Max: 37.6 (15 Jorge 2021 20:45)  T(F): 98.5 (16 Jun 2021 14:45), Max: 99.7 (15 Jorge 2021 20:45)  HR: 79 (16 Jun 2021 14:45) (79 - 82)  BP: 149/67 (16 Jun 2021 14:45) (133/73 - 155/63)  BP(mean): --  RR: 18 (16 Jun 2021 14:45) (18 - 18)  SpO2: 95% (16 Jun 2021 14:45) (94% - 95%)    Physical Exam  Gen: morbidly obese  Lungs: CTA B/L,   Heart: RRR,   Back: no longer able to elicit significant TTP at T-L spine junction  Abdomen: soft, non-ttp  Ext: b/l lympedema, chronic venous changes, still significant ROM limitations of LLEbut able to move the knee a little now.   Skin: LLE skin discoloration, no evidence of cellulitis                          11.3   19.77 )-----------( 410      ( 16 Jun 2021 07:11 )             35.9       06-16    131<L>  |  91<L>  |  13  ----------------------------<  123<H>  4.4   |  27  |  0.57    Ca    9.4      16 Jun 2021 07:11            MICROBIOLOGY:Culture Results:   No growth to date. (06-15 @ 09:09)  Culture Results:   No growth to date. (06-15 @ 09:09)  Culture Results:   No growth to date. (06-14 @ 21:03)  Culture Results:   No growth to date. (06-14 @ 21:03)  Culture - Body Fluid with Gram Stain (06.08.21 @ 22:17)   - Gentamicin: S <=1 Should not be used as monotherapy   - Ampicillin/Sulbactam: S <=8/4   - Oxacillin: S 0.5   - Penicillin: R >8   - RIF- Rifampin: S <=1 Should not be used as monotherapy   - Tetra/Doxy: S <=1   - Trimethoprim/Sulfamethoxazole: S <=0.5/9.5   - Vancomycin: S 1   Gram Stain:   No polymorphonuclear cells seen   No organisms seen   by cytocentrifuge   - Cefazolin: S <=4   - Clindamycin: S <=0.25   - Erythromycin: S <=0.25   Specimen Source: .Body Fluid Synovial Fluid   Culture Results:   Rare Staphylococcus aureus   Organism Identification: Staphylococcus aureus   Organism: Staphylococcus aureus   Method Type: CORTES Culture - Urine (06.07.21 @ 06:27)   - Gentamicin: S <=2   - Tobramycin: S <=2   - Piperacillin/Tazobactam: S 16   - Aztreonam: S <=4   - Imipenem: S <=1   - Levofloxacin: S <=0.5   - Meropenem: S <=1   - Amikacin: S <=16   - Cefepime: S <=2   - Ceftazidime: S 4   - Ciprofloxacin: S <=0.25   Specimen Source: .Urine Clean Catch (Midstream)   Culture Results:   10,000 - 49,000 CFU/mL Pseudomonas aeruginosa   <10,000 CFU/ml Normal Urogenital armen present   Organism Identification: Pseudomonas aeruginosa   Organism: Pseudomonas aeruginosa   Method Type: CORTES     RADIOLOGY:  < from: CT Lumbar Spine w/ IV Cont (06.16.21 @ 12:12) >      IMPRESSION:    No endplate erosive changes to suggest discitis or osteomyelitis. No evidence of abnormal enhancement within the spinal canal. Of note, MRI is a more sensitive evaluation for infection of the vertebral bodies, disc spaces and spinal canal, if there is clinical concern.    Posterior disc osteophyte complexes at L2/L3 through L5/S1 result in bilateral foraminal stenoses at these levels. No high-grade central canal compromise is identified.

## 2021-06-16 NOTE — PROGRESS NOTE ADULT - SUBJECTIVE AND OBJECTIVE BOX
S: c/o left knee pain. Denies chest pain or SOB. Review of systems otherwise (-)    Constitutional: [ ] fevers, [ ] chills.   Skin: [ ] dry skin. [ ] rashes.  Psychiatric: [ ] depression, [ ] anxiety.   Gastrointestinal: [ ] BRBPR, [ ] melena.   Neurological: [ ] confusion. [ ] seizures. [ ] shuffling gait.   Ears,Nose,Mouth and Throat: [ ] ear pain [ ] sore throat.   Eyes: [ ] diplopia.   Respiratory: [ ] hemoptysis. [ ] shortness of breath  Cardiovascular: See HPI above  Hematologic/Lymphatic: [ ] anemia. [ ] painful nodes. [ ] prolonged bleeding.   Genitourinary: [ ] hematuria. [ ] flank pain.   Endocrine: [ ] significant change in weight. [ ] intolerance to heat and cold.     Review of systems [ x] otherwise negative, [ ] otherwise unable to obtain    FH: no family history of sudden cardiac death in first degree relatives    SH: [ ] tobacco, [ ] alcohol, [ ] drugs      MEDICATIONS  (STANDING):  atorvastatin 10 milliGRAM(s) Oral at bedtime  ceFAZolin   IVPB 2000 milliGRAM(s) IV Intermittent every 6 hours  chlorhexidine 4% Liquid 1 Application(s) Topical daily  DULoxetine 60 milliGRAM(s) Oral daily  heparin   Injectable 5000 Unit(s) SubCutaneous every 8 hours  levothyroxine 25 MICROGram(s) Oral daily  pantoprazole    Tablet 40 milliGRAM(s) Oral before breakfast    MEDICATIONS  (PRN):  acetaminophen   Tablet .. 650 milliGRAM(s) Oral every 6 hours PRN Temp greater or equal to 38C (100.4F)  oxyCODONE    IR 10 milliGRAM(s) Oral every 4 hours PRN Moderate Pain (4 - 6)      LABS:                          11.3   19.77 )-----------( 410      ( 16 Jun 2021 07:11 )             35.9     Hemoglobin: 11.3 g/dL (06-16 @ 07:11)  Hemoglobin: 11.8 g/dL (06-15 @ 07:15)  Hemoglobin: 11.6 g/dL (06-14 @ 06:36)    06-16    131<L>  |  91<L>  |  13  ----------------------------<  123<H>  4.4   |  27  |  0.57    Ca    9.4      16 Jun 2021 07:11      Creatinine Trend: 0.57<--, 0.51<--, 0.51<--, 0.51<--, 0.53<--, 0.61<--             06-15-21 @ 07:01  -  06-16-21 @ 07:00  --------------------------------------------------------  IN: 1640 mL / OUT: 1900 mL / NET: -260 mL        PHYSICAL EXAM  Vital Signs Last 24 Hrs  T(C): 36.4 (16 Jun 2021 04:55), Max: 37.6 (15 Jorge 2021 20:45)  T(F): 97.5 (16 Jun 2021 04:55), Max: 99.7 (15 Jorge 2021 20:45)  HR: 82 (16 Jun 2021 04:55) (80 - 82)  BP: 155/63 (16 Jun 2021 04:55) (115/80 - 155/63)  BP(mean): --  RR: 18 (16 Jun 2021 04:55) (18 - 18)  SpO2: 94% (16 Jun 2021 04:55) (94% - 95%)      Gen: Appears well in NAD  HEENT:  (-)icterus (-)pallor  CV: N S1 S2 1/6 DIETER (+)2 Pulses B/l  Resp:  Clear to auscultation B/L, normal effort  GI: (+) BS Soft, NT, ND  Lymph:  (+) B/L chronic LE Edema, (-)obvious lymphadenopathy  Skin: Warm to touch, Normal turgor  Psych: Appropriate mood and affect    TELEMETRY: None	      ECG: NSR, low voltage QRS     < from: TTE with Doppler (w/Cont) (06.08.21 @ 16:27) >  Conclusions:  Technically difficult study-valves not well visualized.  1. Endocardial visualization enhanced with intravenous  injection of Ultrasonic Enhancing Agent (Definity). Overall  preserved left ventricular ejection fraction. Wall motion  evaluation is limited.  2. No Valves visualized. Unable to exclude valvular  vegetations in the setting of a technically difficult  study.  3. DENIS could be obtained for further evaluation of valvular  vegetations, if clinical suspicion of endocarditis is high.    *** Noprevious Echo exam.    < end of copied text >  	    RADIOLOGY:         CXR: Clear lungs    ASSESSMENT/PLAN: Patient is a 70 y/o female with PMH of b/l lymphedema, morbid obesity, HTN, and hypothyroidism who presented with L knee pain and inability to walk concerning for septic joint and found to have staph aureus bacteremia. Cardiology consulted for preop clearance.     - No evidence of clinical HF or anginal symptoms  - Ortho follow up - s/p L knee I&D  - Abx per ID  - TTE noted difficult study, preserved LVEF   - F/u CT Spine results  - Plan for DENIS in OR - f/u timing per echo dept/OR    Paras Perez PA-C  Pager: [phone]

## 2021-06-16 NOTE — PROGRESS NOTE ADULT - ASSESSMENT
A/P: 71F POD6 s/p I&D L knee    - Pain control  - Hemovac d/c'd  - PICC and Antibiotics per ID  - Culture growing MSSA  - DVT ppx per primary team  -FU DENIS and FU repeat BCx  - Appreciate care per primary team  -Nothing further planned from ortho at this time.   -Pt can fu outpatient when safe for Dispo  -Call office office for appt

## 2021-06-16 NOTE — PROGRESS NOTE ADULT - ASSESSMENT
70yo female b/l lymphedema, morbid obesity, HTN, hypothyrodism, ambulates with walker p/w 2 days L knee pain preventing her from walking today. Pt found to have MSSA bacteremia c/b septic left knee      MSSA Bacteremia, leucocytosis, lt knee septic arthritis: s/p wash out   -c/w Cefazolin at 2g q6   -repeat Bcx 6/8, 6/14, 6/15 NTD  -TTE: Valves not well visualized, cannot rule out vegetation. Will need DENIS. Planned for OR.  -Pt had some tenderness at T-L spine junction, now resolved. CT T and L spine without evidence of abscess, OM/discitis  -Ortho on board   -trend CBC for leucocytosis, continues to be elevated but pt clinically improved.   - will eventually need PICC

## 2021-06-16 NOTE — PROGRESS NOTE ADULT - ASSESSMENT
70yo female b/l lymphedema, morbid obesity, HTN, hypothyrodism, ambulates with walker p/w 2 days L knee pain     Septic Knee   Left knee pain- Reviewed imaging. Ortho consult appreciated.     Recommend MRI Left knee       s/p IR aspiration of Left Knee.    Patient s/p aspiration of Left Knee hemo vac drain removed.    Patient high risk for intermediate risk surgery. Continue with Ancef as per ID.   ID following.   TTE shows Endocardial visualization enhanced with intravenous  injection of Ultrasonic Enhancing Agent (Definity), preserved left ventricular ejection fraction.  Plan for DENIS     Hypothyroidism Continue with Synthroid     HTN Continue with Lisinopril. Hold Norvasc.

## 2021-06-16 NOTE — PRE-ANESTHESIA EVALUATION ADULT - NSANTHOSAYNRD_GEN_A_CORE
No. MIKA screening performed.  STOP BANG Legend: 0-2 = LOW Risk; 3-4 = INTERMEDIATE Risk; 5-8 = HIGH Risk
No. MIKA screening performed.  STOP BANG Legend: 0-2 = LOW Risk; 3-4 = INTERMEDIATE Risk; 5-8 = HIGH Risk

## 2021-06-16 NOTE — CHART NOTE - NSCHARTNOTEFT_GEN_A_CORE
Nutrition Follow Up Note  Patient seen for: nutrition follow-up    Chart reviewed, events noted. Per chart, "72yo female b/l lymphedema, morbid obesity, HTN, hypothyrodism, ambulates with walker p/w 2 days L knee pain preventing her from walking today. Pt found to have MSSA bacteremia c/b septic left knee."    Source: [x] Patient       [x] EMR        [] RN        [] Family at bedside       [] Other:    -If unable to interview patient: [] Trach/Vent/BiPAP  [] Disoriented/confused/inappropriate to interview    Diet Order:   Diet, Regular (06-11-21)    Pt visited at bedside who reports good appetite/PO intake; states, "your food is so tasty!" Pt had no food preference requests. Pt aware RD remains available.    GI: Pt without current complaints of nausea or vomiting. Last BM x1 on 6/11 per nursing flowsheets.  Bowel Regimen? [] Yes   [x] No    Current dosing weight: 204.1 Kg (6/16)  Weight Hx: 204.1 Kg (6/7)    Nutritionally Pertinent MEDICATIONS  (STANDING):  atorvastatin  ceFAZolin   IVPB  levothyroxine  pantoprazole    Tablet    Pertinent Labs: 06-16 @ 07:11: Na 131<L>, BUN 13, Cr 0.57, <H>, K+ 4.4    Skin: DTI to R and L posterior thighs, DTI to L lower posterior leg, stage I to L and R heels, suspected DTI to coccyx per nursing flowsheets   Edema: 2+ edema to b/l legs per nursing flowsheets     Estimated Needs: estimated nutrient needs based on upper IBW of 62.3 Kg with considerations for wound healing  [x] no change since previous assessment  Estimated Calorie Needs: 9180-3799 Kcal/day (25-30 Kcal/Kg)  Estimated Protein Needs: 74.8-87.2 gm protein/day (1-1.2 gm protein/Kg)  Estimated Fluid Needs: per team    Previous Nutrition Diagnoses:  1. Overweight/Obesity  Nutrition Diagnosis is: [x] ongoing --- education as able/appropriate    2. Increased Nutrient Needs  Nutrition Diagnosis is: [x] ongoing --- being addressed with monitoring PO intake and weight trends     New Nutrition Diagnosis: [x] Not applicable    Education Provided:       [] Yes:  [x] No:     Recommendations/Interventions:  1. Continue Regular Diet as ordered  2. Recommend Multivitamin and 500mg Vitamin C to aid in wound healing   3. Recommend Toni x2/day (provides 90 Kcal, 2.5 g collagen protein, 14g of essential amino acids per 8 oz. serving)   4. Continue to monitor weight trends, diet advancement, PO intake, GI function, electrolytes, and skin integrity    RD remains available upon request and will follow up per protocol.     Kenisha Castellon RD CDN (Pager [phone])

## 2021-06-16 NOTE — PRE-ANESTHESIA EVALUATION ADULT - NSRADCARDRESULTSFT_GEN_ALL_CORE
Conclusions:  Technically difficult study-valves not well visualized.  1. Endocardial visualization enhanced with intravenous  injection of Ultrasonic Enhancing Agent (Definity). Overall  preserved left ventricular ejection fraction. Wall motion  evaluation is limited.  2. No Valves visualized. Unable to exclude valvular  vegetations in the setting of a technically difficult  study.  3. DENIS could be obtained for further evaluation of valvular  vegetations, if clinical suspicion of endocarditis is high.

## 2021-06-16 NOTE — PROGRESS NOTE ADULT - SUBJECTIVE AND OBJECTIVE BOX
Patient is a 71y old  Female who presents with a chief complaint of Left knee pain today (08 Jun 2021 17:01)      SUBJECTIVE / OVERNIGHT EVENTS: no events over night     DENIS am tomorrow     T(C): 37.4 (06-16-21 @ 21:23), Max: 37.4 (06-16-21 @ 21:23)  HR: 82 (06-16-21 @ 21:23) (73 - 84)  BP: 151/81 (06-16-21 @ 21:23) (131/87 - 157/65)  RR: 17 (06-16-21 @ 21:23) (17 - 18)  SpO2: 96% (06-16-21 @ 21:23) (94% - 100%)      MEDICATIONS  (STANDING):  atorvastatin 10 milliGRAM(s) Oral at bedtime  ceFAZolin   IVPB 2000 milliGRAM(s) IV Intermittent every 6 hours  chlorhexidine 4% Liquid 1 Application(s) Topical daily  DULoxetine 60 milliGRAM(s) Oral daily  heparin   Injectable 5000 Unit(s) SubCutaneous every 8 hours  levothyroxine 25 MICROGram(s) Oral daily  pantoprazole    Tablet 40 milliGRAM(s) Oral before breakfast    MEDICATIONS  (PRN):  acetaminophen   Tablet .. 650 milliGRAM(s) Oral every 6 hours PRN Temp greater or equal to 38C (100.4F)  oxyCODONE    IR 10 milliGRAM(s) Oral every 4 hours PRN Moderate Pain (4 - 6)              PHYSICAL EXAM:  GENERAL: NAD, well-developed  HEAD:  Atraumatic, Normocephalic  EYES: EOMI, conjunctiva and sclera clear  NECK: Supple, No JVD  CHEST/LUNG: Clear to auscultation bilaterally; No wheeze  HEART: Regular rate and rhythm; No murmurs, rubs, or gallops  ABDOMEN: Soft, Nontender, Nondistended; Bowel sounds present  EXTREMITIES:  Left knee swollen, erythematous   2+ Peripheral Pulses, No clubbing, cyanosis, or edema  PSYCH: AAOx3  NEUROLOGY: non-focal  SKIN: No rashes or lesions                                                  11.3   19.77 )-----------( 410      ( 16 Jun 2021 07:11 )             35.9             PT/INR - ( 15 Jorge 2021 07:15 )   PT: 15.0 sec;   INR: 1.26 ratio         PTT - ( 15 Jorge 2021 07:15 )  PTT:28.8 sec  131|91|13<123  4.4|27|0.57  9.4,--,--  06-16 @ 07:11            RADIOLOGY & ADDITIONAL TESTS:    Imaging Personally Reviewed:    Consultant(s) Notes Reviewed:      Care Discussed with Consultants/Other Providers:

## 2021-06-16 NOTE — PROGRESS NOTE ADULT - SUBJECTIVE AND OBJECTIVE BOX
Ortho Progress Note    S: Patient seen and examined. No acute events overnight. Pain well controlled with current regimen. Denies lightheadedness/dizziness, CP/SOB. Tolerating diet. Hemovac drain was d/c'd.      O:  Physical Exam:  Gen: Laying in bed, NAD, alert and oriented.   Ext: LLE- dressing c/d/i          EHL/FHL/TA/Sol intact          + SILT DP/SP/FRYE/Sa/T          +DP, extremity WWP    Vital Signs Last 24 Hrs  T(C): 36.4 (16 Jun 2021 04:55), Max: 37.6 (15 Jorge 2021 20:45)  T(F): 97.5 (16 Jun 2021 04:55), Max: 99.7 (15 Jorge 2021 20:45)  HR: 82 (16 Jun 2021 04:55) (80 - 82)  BP: 155/63 (16 Jun 2021 04:55) (115/80 - 155/63)  BP(mean): --  RR: 18 (16 Jun 2021 04:55) (18 - 18)  SpO2: 94% (16 Jun 2021 04:55) (94% - 95%)

## 2021-06-17 PROCEDURE — 99232 SBSQ HOSP IP/OBS MODERATE 35: CPT | Mod: GC

## 2021-06-17 RX ADMIN — OXYCODONE HYDROCHLORIDE 10 MILLIGRAM(S): 5 TABLET ORAL at 11:45

## 2021-06-17 RX ADMIN — Medication 25 MICROGRAM(S): at 05:07

## 2021-06-17 RX ADMIN — Medication 100 MILLIGRAM(S): at 17:05

## 2021-06-17 RX ADMIN — HEPARIN SODIUM 5000 UNIT(S): 5000 INJECTION INTRAVENOUS; SUBCUTANEOUS at 03:51

## 2021-06-17 RX ADMIN — PANTOPRAZOLE SODIUM 40 MILLIGRAM(S): 20 TABLET, DELAYED RELEASE ORAL at 05:08

## 2021-06-17 RX ADMIN — OXYCODONE HYDROCHLORIDE 10 MILLIGRAM(S): 5 TABLET ORAL at 17:40

## 2021-06-17 RX ADMIN — HEPARIN SODIUM 5000 UNIT(S): 5000 INJECTION INTRAVENOUS; SUBCUTANEOUS at 21:10

## 2021-06-17 RX ADMIN — Medication 100 MILLIGRAM(S): at 22:52

## 2021-06-17 RX ADMIN — HEPARIN SODIUM 5000 UNIT(S): 5000 INJECTION INTRAVENOUS; SUBCUTANEOUS at 11:06

## 2021-06-17 RX ADMIN — OXYCODONE HYDROCHLORIDE 10 MILLIGRAM(S): 5 TABLET ORAL at 21:47

## 2021-06-17 RX ADMIN — OXYCODONE HYDROCHLORIDE 10 MILLIGRAM(S): 5 TABLET ORAL at 11:13

## 2021-06-17 RX ADMIN — OXYCODONE HYDROCHLORIDE 10 MILLIGRAM(S): 5 TABLET ORAL at 04:30

## 2021-06-17 RX ADMIN — CHLORHEXIDINE GLUCONATE 1 APPLICATION(S): 213 SOLUTION TOPICAL at 11:06

## 2021-06-17 RX ADMIN — ATORVASTATIN CALCIUM 10 MILLIGRAM(S): 80 TABLET, FILM COATED ORAL at 21:09

## 2021-06-17 RX ADMIN — Medication 100 MILLIGRAM(S): at 11:06

## 2021-06-17 RX ADMIN — OXYCODONE HYDROCHLORIDE 10 MILLIGRAM(S): 5 TABLET ORAL at 21:10

## 2021-06-17 RX ADMIN — OXYCODONE HYDROCHLORIDE 10 MILLIGRAM(S): 5 TABLET ORAL at 03:51

## 2021-06-17 RX ADMIN — OXYCODONE HYDROCHLORIDE 10 MILLIGRAM(S): 5 TABLET ORAL at 17:12

## 2021-06-17 RX ADMIN — DULOXETINE HYDROCHLORIDE 60 MILLIGRAM(S): 30 CAPSULE, DELAYED RELEASE ORAL at 11:06

## 2021-06-17 RX ADMIN — Medication 100 MILLIGRAM(S): at 05:05

## 2021-06-17 NOTE — PROGRESS NOTE ADULT - SUBJECTIVE AND OBJECTIVE BOX
Follow Up:  MSSA BSI, L Septic Knee    Inverval History/ROS:Patient is a 71y old  Female who presents with a chief complaint of Left knee pain today (16 Jun 2021 15:23)    Pt remains afebrile. No labs as of yet to assess WBC. Yesterday pt went for CT T, L spine as well as DENIS, all of which were negative for signs of metastatic foci of MSSA.    Pt feels pain in knee may be slightly improved, denies fever, chills, back pain, or other complaints.  Allergies    No Known Allergies    Intolerances        ANTIMICROBIALS:  ceFAZolin   IVPB 2000 every 6 hours      OTHER MEDS:  acetaminophen   Tablet .. 650 milliGRAM(s) Oral every 6 hours PRN  atorvastatin 10 milliGRAM(s) Oral at bedtime  chlorhexidine 4% Liquid 1 Application(s) Topical daily  DULoxetine 60 milliGRAM(s) Oral daily  heparin   Injectable 5000 Unit(s) SubCutaneous every 8 hours  levothyroxine 25 MICROGram(s) Oral daily  oxyCODONE    IR 10 milliGRAM(s) Oral every 4 hours PRN  pantoprazole    Tablet 40 milliGRAM(s) Oral before breakfast      Vital Signs Last 24 Hrs  T(C): 36.9 (17 Jun 2021 04:18), Max: 37.4 (16 Jun 2021 21:23)  T(F): 98.4 (17 Jun 2021 04:18), Max: 99.4 (16 Jun 2021 21:23)  HR: 84 (17 Jun 2021 04:18) (73 - 84)  BP: 147/69 (17 Jun 2021 04:18) (131/87 - 157/65)  BP(mean): 98 (16 Jun 2021 17:30) (92 - 109)  RR: 17 (17 Jun 2021 04:18) (17 - 18)  SpO2: 95% (17 Jun 2021 04:18) (94% - 100%)    Physical Exam  Gen: morbidly obese, non-toxic  Lungs: CTA B/L, no w/r/r  Heart: RRR, no m/r/g  Abdomen: soft, non-ttp  Ext: b/l lymphedema, area of color change/erythema inner left leg, persistent, blanching, does not appear infected. ROM LLE limited 2/2 pain  Skin: no rashes                          11.3   19.77 )-----------( 410      ( 16 Jun 2021 07:11 )             35.9       06-16    131<L>  |  91<L>  |  13  ----------------------------<  123<H>  4.4   |  27  |  0.57    Ca    9.4      16 Jun 2021 07:11            MICROBIOLOGY:Culture Results:   No growth to date. (06-15 @ 09:09)  Culture Results:   No growth to date. (06-15 @ 09:09)  Culture Results:   No growth to date. (06-14 @ 21:03)  Culture Results:   No growth to date. (06-14 @ 21:03)      RADIOLOGY:  radi< from: CT Lumbar Spine w/ IV Cont (06.16.21 @ 12:12) >  IMPRESSION:    No endplate erosive changes to suggest discitis or osteomyelitis. No evidence of abnormal enhancement within the spinal canal. Of note, MRI is a more sensitive evaluation for infection of the vertebral bodies, disc spaces and spinal canal, if there is clinical concern.    Posterior disc osteophyte complexes at L2/L3 through L5/S1 result in bilateral foraminal stenoses at these levels. No high-grade central canal compromise is identified.    < end of copied text >  < from: DENIS w/o TTE (w/3D Echo) (06.16.21 @ 15:50) >  Conclusions:  1. Normal mitral valve. Mild mitral regurgitation.  2. Normal trileaflet aortic valve. Minimal aortic  regurgitation.  3. Normal left atrium.  No left atrium or left atrial  appendage thrombus. A membrane was seen in the left atrium  attached to the atrial septum consistent with left atrial  septal pouch.  4. Normal left ventricular systolic function. No segmental  wall motion abnormalities.  5. Normal right ventricular size and function.  6. No evidence of valvular vegetation is seen.    < end of copied text >     Follow Up:  MSSA BSI, L Septic Knee    Inverval History/ROS:Patient is a 71y old  Female who presents with a chief complaint of Left knee pain today (16 Jun 2021 15:23)    Pt remains afebrile. No labs as of yet to assess WBC. Yesterday pt went for CT T, L spine as well as DENIS, all of which were negative for signs of metastatic foci of MSSA.    Pt feels pain in knee may be slightly improved, denies fever, chills, back pain, or other complaints.      Allergies  No Known Allergies      ANTIMICROBIALS:  ceFAZolin   IVPB 2000 every 6 hours      OTHER MEDS:  acetaminophen   Tablet .. 650 milliGRAM(s) Oral every 6 hours PRN  atorvastatin 10 milliGRAM(s) Oral at bedtime  chlorhexidine 4% Liquid 1 Application(s) Topical daily  DULoxetine 60 milliGRAM(s) Oral daily  heparin   Injectable 5000 Unit(s) SubCutaneous every 8 hours  levothyroxine 25 MICROGram(s) Oral daily  oxyCODONE    IR 10 milliGRAM(s) Oral every 4 hours PRN  pantoprazole    Tablet 40 milliGRAM(s) Oral before breakfast      Vital Signs Last 24 Hrs  T(C): 36.9 (17 Jun 2021 04:18), Max: 37.4 (16 Jun 2021 21:23)  T(F): 98.4 (17 Jun 2021 04:18), Max: 99.4 (16 Jun 2021 21:23)  HR: 84 (17 Jun 2021 04:18) (73 - 84)  BP: 147/69 (17 Jun 2021 04:18) (131/87 - 157/65)  BP(mean): 98 (16 Jun 2021 17:30) (92 - 109)  RR: 17 (17 Jun 2021 04:18) (17 - 18)  SpO2: 95% (17 Jun 2021 04:18) (94% - 100%)      Physical Exam  Gen: morbidly obese, non-toxic  Lungs: CTA B/L,  Heart: RRR,   Abdomen: soft, non-ttp  Ext: b/l lymphedema, area of color change/erythema inner left leg, persistent, blanching, does not appear infected. ROM LLE limited 2/2 pain  Skin: no rashes  no phlebitis                           11.3   19.77 )-----------( 410      ( 16 Jun 2021 07:11 )             35.9       06-16    131<L>  |  91<L>  |  13  ----------------------------<  123<H>  4.4   |  27  |  0.57    Ca    9.4      16 Jun 2021 07:11        MICROBIOLOGY:Culture Results:   No growth to date. (06-15 @ 09:09)  Culture Results:   No growth to date. (06-15 @ 09:09)  Culture Results:   No growth to date. (06-14 @ 21:03)  Culture Results:   No growth to date. (06-14 @ 21:03)      RADIOLOGY:  < from: CT Lumbar Spine w/ IV Cont (06.16.21 @ 12:12) >  IMPRESSION:    No endplate erosive changes to suggest discitis or osteomyelitis. No evidence of abnormal enhancement within the spinal canal. Of note, MRI is a more sensitive evaluation for infection of the vertebral bodies, disc spaces and spinal canal, if there is clinical concern.    Posterior disc osteophyte complexes at L2/L3 through L5/S1 result in bilateral foraminal stenoses at these levels. No high-grade central canal compromise is identified.      < from: DENIS w/o TTE (w/3D Echo) (06.16.21 @ 15:50) >  Conclusions:  1. Normal mitral valve. Mild mitral regurgitation.  2. Normal trileaflet aortic valve. Minimal aortic  regurgitation.  3. Normal left atrium.  No left atrium or left atrial  appendage thrombus. A membrane was seen in the left atrium  attached to the atrial septum consistent with left atrial  septal pouch.  4. Normal left ventricular systolic function. No segmental  wall motion abnormalities.  5. Normal right ventricular size and function.  6. No evidence of valvular vegetation is seen.

## 2021-06-17 NOTE — PROGRESS NOTE ADULT - SUBJECTIVE AND OBJECTIVE BOX
S: Still has left knee pain pending position of leg. Denies chest pain or SOB. Review of systems otherwise (-)    Constitutional: [ ] fevers, [ ] chills.   Skin: [ ] dry skin. [ ] rashes.  Psychiatric: [ ] depression, [ ] anxiety.   Gastrointestinal: [ ] BRBPR, [ ] melena.   Neurological: [ ] confusion. [ ] seizures. [ ] shuffling gait.   Ears,Nose,Mouth and Throat: [ ] ear pain [ ] sore throat.   Eyes: [ ] diplopia.   Respiratory: [ ] hemoptysis. [ ] shortness of breath  Cardiovascular: See HPI above  Hematologic/Lymphatic: [ ] anemia. [ ] painful nodes. [ ] prolonged bleeding.   Genitourinary: [ ] hematuria. [ ] flank pain.   Endocrine: [ ] significant change in weight. [ ] intolerance to heat and cold.     Review of systems [ x] otherwise negative, [ ] otherwise unable to obtain    FH: no family history of sudden cardiac death in first degree relatives    SH: [ ] tobacco, [ ] alcohol, [ ] drugs      MEDICATIONS  (STANDING):  atorvastatin 10 milliGRAM(s) Oral at bedtime  ceFAZolin   IVPB 2000 milliGRAM(s) IV Intermittent every 6 hours  chlorhexidine 4% Liquid 1 Application(s) Topical daily  DULoxetine 60 milliGRAM(s) Oral daily  heparin   Injectable 5000 Unit(s) SubCutaneous every 8 hours  levothyroxine 25 MICROGram(s) Oral daily  pantoprazole    Tablet 40 milliGRAM(s) Oral before breakfast    MEDICATIONS  (PRN):  acetaminophen   Tablet .. 650 milliGRAM(s) Oral every 6 hours PRN Temp greater or equal to 38C (100.4F)  oxyCODONE    IR 10 milliGRAM(s) Oral every 4 hours PRN Moderate Pain (4 - 6)      LABS:                          11.3   19.77 )-----------( 410      ( 16 Jun 2021 07:11 )             35.9     Hemoglobin: 11.3 g/dL (06-16 @ 07:11)  Hemoglobin: 11.8 g/dL (06-15 @ 07:15)  Hemoglobin: 11.6 g/dL (06-14 @ 06:36)    06-16    131<L>  |  91<L>  |  13  ----------------------------<  123<H>  4.4   |  27  |  0.57    Ca    9.4      16 Jun 2021 07:11      Creatinine Trend: 0.57<--, 0.51<--, 0.51<--, 0.51<--, 0.53<--, 0.61<--             06-16-21 @ 07:01  -  06-17-21 @ 07:00  --------------------------------------------------------  IN: 490 mL / OUT: 600 mL / NET: -110 mL    06-17-21 @ 07:01  -  06-17-21 @ 15:35  --------------------------------------------------------  IN: 480 mL / OUT: 1200 mL / NET: -720 mL        PHYSICAL EXAM  Vital Signs Last 24 Hrs  T(C): 36.7 (17 Jun 2021 11:58), Max: 37.4 (16 Jun 2021 21:23)  T(F): 98.1 (17 Jun 2021 11:58), Max: 99.4 (16 Jun 2021 21:23)  HR: 61 (17 Jun 2021 11:58) (61 - 84)  BP: 121/64 (17 Jun 2021 11:58) (121/64 - 157/65)  BP(mean): 98 (16 Jun 2021 17:30) (92 - 109)  RR: 17 (17 Jun 2021 11:58) (17 - 18)  SpO2: 96% (17 Jun 2021 11:58) (94% - 100%)        Gen: Appears well in NAD  HEENT:  (-)icterus (-)pallor  CV: N S1 S2 1/6 DIETER (+)2 Pulses B/l  Resp:  Clear to auscultation B/L, normal effort  GI: (+) BS Soft, NT, ND  Lymph:  (+) B/L chronic LE Edema, (-)obvious lymphadenopathy  Skin: Warm to touch, Normal turgor  Psych: Appropriate mood and affect    TELEMETRY: None	      ECG: NSR, low voltage QRS     < from: TTE with Doppler (w/Cont) (06.08.21 @ 16:27) >  Conclusions:  Technically difficult study-valves not well visualized.  1. Endocardial visualization enhanced with intravenous  injection of Ultrasonic Enhancing Agent (Definity). Overall  preserved left ventricular ejection fraction. Wall motion  evaluation is limited.  2. No Valves visualized. Unable to exclude valvular  vegetations in the setting of a technically difficult  study.  3. DENIS could be obtained for further evaluation of valvular  vegetations, if clinical suspicion of endocarditis is high.    *** Noprevious Echo exam.    < end of copied text >      < from: DENIS w/o TTE (w/3D Echo) (06.16.21 @ 15:50) >  Conclusions:  1. Normal mitral valve. Mild mitral regurgitation.  2. Normal trileaflet aortic valve. Minimal aortic  regurgitation.  3. Normal left atrium.  No left atrium or left atrial  appendage thrombus. A membrane was seen in the left atrium  attached to the atrial septum consistent with left atrial  septal pouch.  4. Normal left ventricular systolic function. No segmental  wall motion abnormalities.  5. Normal right ventricular size and function.  6. No evidence of valvular vegetation is seen.    < end of copied text >  	    RADIOLOGY:         CXR: Clear lungs    ASSESSMENT/PLAN: Patient is a 72 y/o female with PMH of b/l lymphedema, morbid obesity, HTN, and hypothyroidism who presented with L knee pain and inability to walk concerning for septic joint and found to have staph aureus bacteremia. Cardiology consulted for preop clearance.     - No evidence of clinical HF or anginal symptoms  - Ortho follow up - s/p L knee I&D  - Abx per ID  - TTE noted difficult study, preserved LVEF   - CT spine noted  - DENIS negative for endocarditis   - No further inpatient cardiac w/u planned    Paras Perez PA-C  Pager: [phone]

## 2021-06-17 NOTE — PROGRESS NOTE ADULT - SUBJECTIVE AND OBJECTIVE BOX
Patient is a 71y old  Female who presents with a chief complaint of Left knee pain today (08 Jun 2021 17:01)      SUBJECTIVE / OVERNIGHT EVENTS: no events over night     MSSA Bacteremia, leucocytosis, lt knee septic arthritis: s/p wash out   -c/w Cefazolin at 2g q6   -repeat Bcx 6/8, 6/14, 6/15 NTD  -TTE: Valves not well visualized, cannot rule out vegetation. DENIS 6/16 without evidence of IE.      T(C): 37.4 (06-17-21 @ 20:58), Max: 37.4 (06-17-21 @ 20:58)  HR: 98 (06-17-21 @ 20:58) (61 - 98)  BP: 155/82 (06-17-21 @ 20:58) (121/64 - 155/82)  RR: 17 (06-17-21 @ 20:58) (17 - 17)  SpO2: 96% (06-17-21 @ 20:58) (96% - 96%)      MEDICATIONS  (STANDING):  atorvastatin 10 milliGRAM(s) Oral at bedtime  ceFAZolin   IVPB 2000 milliGRAM(s) IV Intermittent every 6 hours  chlorhexidine 4% Liquid 1 Application(s) Topical daily  DULoxetine 60 milliGRAM(s) Oral daily  heparin   Injectable 5000 Unit(s) SubCutaneous every 8 hours  levothyroxine 25 MICROGram(s) Oral daily  pantoprazole    Tablet 40 milliGRAM(s) Oral before breakfast    MEDICATIONS  (PRN):  acetaminophen   Tablet .. 650 milliGRAM(s) Oral every 6 hours PRN Temp greater or equal to 38C (100.4F)  oxyCODONE    IR 10 milliGRAM(s) Oral every 4 hours PRN Moderate Pain (4 - 6)        PHYSICAL EXAM:  GENERAL: NAD, well-developed  HEAD:  Atraumatic, Normocephalic  EYES: EOMI, conjunctiva and sclera clear  NECK: Supple, No JVD  CHEST/LUNG: Clear to auscultation bilaterally; No wheeze  HEART: Regular rate and rhythm; No murmurs, rubs, or gallops  ABDOMEN: Soft, Nontender, Nondistended; Bowel sounds present  EXTREMITIES:  Left knee swollen, erythematous   2+ Peripheral Pulses, No clubbing, cyanosis, or edema  PSYCH: AAOx3  NEUROLOGY: non-focal  SKIN: No rashes or lesions                                                  11.3   19.77 )-----------( 410      ( 16 Jun 2021 07:11 )             35.9             PT/INR - ( 15 Jorge 2021 07:15 )   PT: 15.0 sec;   INR: 1.26 ratio         PTT - ( 15 Jorge 2021 07:15 )  PTT:28.8 sec  131|91|13<123  4.4|27|0.57  9.4,--,--  06-16 @ 07:11            RADIOLOGY & ADDITIONAL TESTS:    Imaging Personally Reviewed:    Consultant(s) Notes Reviewed:      Care Discussed with Consultants/Other Providers:

## 2021-06-17 NOTE — PROGRESS NOTE ADULT - ASSESSMENT
70yo female b/l lymphedema, morbid obesity, HTN, hypothyrodism, ambulates with walker p/w 2 days L knee pain     Septic Knee   Left knee pain- Reviewed imaging. Ortho consult appreciated.     Recommend MRI Left knee       s/p IR aspiration of Left Knee.    Patient s/p aspiration of Left Knee hemo vac drain removed.    Patient high risk for intermediate risk surgery. Continue with Ancef as per ID.   ID following.   TTE shows Endocardial visualization enhanced with intravenous  injection of Ultrasonic Enhancing Agent (Definity), preserved left ventricular ejection fraction.  DENIS no vegetations     Hypothyroidism Continue with Synthroid     HTN Continue with Lisinopril. Hold Norvasc.

## 2021-06-17 NOTE — PROGRESS NOTE ADULT - ASSESSMENT
70yo female b/l lymphedema, morbid obesity, HTN, hypothyrodism, ambulates with walker p/w 2 days L knee pain preventing her from walking today. Pt found to have MSSA bacteremia c/b septic left knee      MSSA Bacteremia, leucocytosis, lt knee septic arthritis: s/p wash out   -c/w Cefazolin at 2g q6   -repeat Bcx 6/8, 6/14, 6/15 NTD  -TTE: Valves not well visualized, cannot rule out vegetation. DENIS 6/16 without evidence of IE.  -Pt had some tenderness at T-L spine junction, now resolved. CT T and L spine without evidence of abscess, OM/discitis  -Ortho on board   -trend CBC for leucocytosis, continues to be elevated but pt clinically improved. No CBC today  - will eventually need PICC

## 2021-06-18 LAB
ANION GAP SERPL CALC-SCNC: 13 MMOL/L — SIGNIFICANT CHANGE UP (ref 5–17)
BUN SERPL-MCNC: 13 MG/DL — SIGNIFICANT CHANGE UP (ref 7–23)
CALCIUM SERPL-MCNC: 9.6 MG/DL — SIGNIFICANT CHANGE UP (ref 8.4–10.5)
CHLORIDE SERPL-SCNC: 91 MMOL/L — LOW (ref 96–108)
CO2 SERPL-SCNC: 27 MMOL/L — SIGNIFICANT CHANGE UP (ref 22–31)
CREAT SERPL-MCNC: 0.58 MG/DL — SIGNIFICANT CHANGE UP (ref 0.5–1.3)
GLUCOSE SERPL-MCNC: 125 MG/DL — HIGH (ref 70–99)
HCT VFR BLD CALC: 37.3 % — SIGNIFICANT CHANGE UP (ref 34.5–45)
HGB BLD-MCNC: 12 G/DL — SIGNIFICANT CHANGE UP (ref 11.5–15.5)
MCHC RBC-ENTMCNC: 29.1 PG — SIGNIFICANT CHANGE UP (ref 27–34)
MCHC RBC-ENTMCNC: 32.2 GM/DL — SIGNIFICANT CHANGE UP (ref 32–36)
MCV RBC AUTO: 90.3 FL — SIGNIFICANT CHANGE UP (ref 80–100)
NRBC # BLD: 0 /100 WBCS — SIGNIFICANT CHANGE UP (ref 0–0)
PLATELET # BLD AUTO: 410 K/UL — HIGH (ref 150–400)
POTASSIUM SERPL-MCNC: 4.4 MMOL/L — SIGNIFICANT CHANGE UP (ref 3.5–5.3)
POTASSIUM SERPL-SCNC: 4.4 MMOL/L — SIGNIFICANT CHANGE UP (ref 3.5–5.3)
RBC # BLD: 4.13 M/UL — SIGNIFICANT CHANGE UP (ref 3.8–5.2)
RBC # FLD: 13.7 % — SIGNIFICANT CHANGE UP (ref 10.3–14.5)
SODIUM SERPL-SCNC: 131 MMOL/L — LOW (ref 135–145)
WBC # BLD: 17.78 K/UL — HIGH (ref 3.8–10.5)
WBC # FLD AUTO: 17.78 K/UL — HIGH (ref 3.8–10.5)

## 2021-06-18 PROCEDURE — 99232 SBSQ HOSP IP/OBS MODERATE 35: CPT | Mod: GC

## 2021-06-18 PROCEDURE — 71045 X-RAY EXAM CHEST 1 VIEW: CPT | Mod: 26

## 2021-06-18 RX ORDER — CEFAZOLIN SODIUM 1 G
2 VIAL (EA) INJECTION
Qty: 124 | Refills: 0
Start: 2021-06-18 | End: 2021-07-18

## 2021-06-18 RX ADMIN — OXYCODONE HYDROCHLORIDE 10 MILLIGRAM(S): 5 TABLET ORAL at 11:14

## 2021-06-18 RX ADMIN — OXYCODONE HYDROCHLORIDE 10 MILLIGRAM(S): 5 TABLET ORAL at 06:42

## 2021-06-18 RX ADMIN — CHLORHEXIDINE GLUCONATE 1 APPLICATION(S): 213 SOLUTION TOPICAL at 11:11

## 2021-06-18 RX ADMIN — Medication 100 MILLIGRAM(S): at 11:09

## 2021-06-18 RX ADMIN — PANTOPRAZOLE SODIUM 40 MILLIGRAM(S): 20 TABLET, DELAYED RELEASE ORAL at 06:05

## 2021-06-18 RX ADMIN — OXYCODONE HYDROCHLORIDE 10 MILLIGRAM(S): 5 TABLET ORAL at 00:49

## 2021-06-18 RX ADMIN — Medication 100 MILLIGRAM(S): at 17:52

## 2021-06-18 RX ADMIN — Medication 100 MILLIGRAM(S): at 23:53

## 2021-06-18 RX ADMIN — OXYCODONE HYDROCHLORIDE 10 MILLIGRAM(S): 5 TABLET ORAL at 15:50

## 2021-06-18 RX ADMIN — HEPARIN SODIUM 5000 UNIT(S): 5000 INJECTION INTRAVENOUS; SUBCUTANEOUS at 11:11

## 2021-06-18 RX ADMIN — OXYCODONE HYDROCHLORIDE 10 MILLIGRAM(S): 5 TABLET ORAL at 15:25

## 2021-06-18 RX ADMIN — OXYCODONE HYDROCHLORIDE 10 MILLIGRAM(S): 5 TABLET ORAL at 12:00

## 2021-06-18 RX ADMIN — Medication 25 MICROGRAM(S): at 06:05

## 2021-06-18 RX ADMIN — OXYCODONE HYDROCHLORIDE 10 MILLIGRAM(S): 5 TABLET ORAL at 06:06

## 2021-06-18 RX ADMIN — OXYCODONE HYDROCHLORIDE 10 MILLIGRAM(S): 5 TABLET ORAL at 03:11

## 2021-06-18 RX ADMIN — ATORVASTATIN CALCIUM 10 MILLIGRAM(S): 80 TABLET, FILM COATED ORAL at 21:53

## 2021-06-18 RX ADMIN — HEPARIN SODIUM 5000 UNIT(S): 5000 INJECTION INTRAVENOUS; SUBCUTANEOUS at 06:05

## 2021-06-18 RX ADMIN — OXYCODONE HYDROCHLORIDE 10 MILLIGRAM(S): 5 TABLET ORAL at 22:30

## 2021-06-18 RX ADMIN — Medication 100 MILLIGRAM(S): at 06:06

## 2021-06-18 RX ADMIN — HEPARIN SODIUM 5000 UNIT(S): 5000 INJECTION INTRAVENOUS; SUBCUTANEOUS at 21:52

## 2021-06-18 RX ADMIN — DULOXETINE HYDROCHLORIDE 60 MILLIGRAM(S): 30 CAPSULE, DELAYED RELEASE ORAL at 11:11

## 2021-06-18 RX ADMIN — OXYCODONE HYDROCHLORIDE 10 MILLIGRAM(S): 5 TABLET ORAL at 21:52

## 2021-06-18 NOTE — PROGRESS NOTE ADULT - SUBJECTIVE AND OBJECTIVE BOX
Follow Up:  MSSA Bacteremia, Septic L Knee    Inverval History/ROS:Patient is a 71y old  Female who presents with a chief complaint of Left knee pain today (18 Jun 2021 09:07)    Pt had PICC Line placed today. Denies fever, chills, back pain. Left knee slowly improving, slightly improved ROM.    Allergies    No Known Allergies    Intolerances        ANTIMICROBIALS:  ceFAZolin   IVPB 2000 every 6 hours      OTHER MEDS:  acetaminophen   Tablet .. 650 milliGRAM(s) Oral every 6 hours PRN  atorvastatin 10 milliGRAM(s) Oral at bedtime  chlorhexidine 4% Liquid 1 Application(s) Topical daily  DULoxetine 60 milliGRAM(s) Oral daily  heparin   Injectable 5000 Unit(s) SubCutaneous every 8 hours  levothyroxine 25 MICROGram(s) Oral daily  oxyCODONE    IR 10 milliGRAM(s) Oral every 4 hours PRN  pantoprazole    Tablet 40 milliGRAM(s) Oral before breakfast      Vital Signs Last 24 Hrs  T(C): 37.7 (18 Jun 2021 04:08), Max: 37.7 (18 Jun 2021 04:08)  T(F): 99.8 (18 Jun 2021 04:08), Max: 99.8 (18 Jun 2021 04:08)  HR: 88 (18 Jun 2021 04:08) (88 - 98)  BP: 158/76 (18 Jun 2021 04:08) (155/82 - 158/76)  BP(mean): --  RR: 17 (18 Jun 2021 04:08) (17 - 17)  SpO2: 96% (18 Jun 2021 04:08) (96% - 96%)    Physical Exam  Gen: morbid obesity, non-toxic  Vascular: RUE PICC  Lungs: CTA B/L, no w/r/r  Heart: RRR, no m/r/g  Abdomen: soft, non-ttp  Ext: b/l lymphedema  Skin: chronic appearing changes to LLE                          12.0   17.78 )-----------( 410      ( 18 Jun 2021 08:58 )             37.3       06-18    131<L>  |  91<L>  |  13  ----------------------------<  125<H>  4.4   |  27  |  0.58    Ca    9.6      18 Jun 2021 08:58            MICROBIOLOGY:Culture Results:   No growth to date. (06-15 @ 09:09)  Culture Results:   No growth to date. (06-15 @ 09:09)  Culture Results:   No growth to date. (06-14 @ 21:03)  Culture Results:   No growth to date. (06-14 @ 21:03)    Culture - Body Fluid with Gram Stain (06.08.21 @ 22:17)   - Gentamicin: S <=1 Should not be used as monotherapy   - Ampicillin/Sulbactam: S <=8/4   - Oxacillin: S 0.5   - Penicillin: R >8   - RIF- Rifampin: S <=1 Should not be used as monotherapy   - Tetra/Doxy: S <=1   - Trimethoprim/Sulfamethoxazole: S <=0.5/9.5   - Vancomycin: S 1   Gram Stain:   No polymorphonuclear cells seen   No organisms seen   by cytocentrifuge   - Cefazolin: S <=4   - Clindamycin: S <=0.25   - Erythromycin: S <=0.25   Specimen Source: .Body Fluid Synovial Fluid   Culture Results:   Rare Staphylococcus aureus   Organism Identification: Staphylococcus aureus   Organism: Staphylococcus aureus   Method Type: CORTES Culture - Blood in AM (06.08.21 @ 14:36)   Specimen Source: .Blood Blood   Culture Results:   No Growth Final     Culture - Blood (06.07.21 @ 00:33)   Gram Stain:   Growth in aerobic bottle: Gram Positive Cocci in Clusters   Growth in anaerobic bottle: Gram Positive Cocci in Clusters   - Ampicillin/Sulbactam: S <=8/4   - Clindamycin: S <=0.25   - Erythromycin: S <=0.25   - Gentamicin: S <=1 Should not be used as monotherapy   - Staphylococcus aureus: Detec Any isolate of Staphylococcus aureus from a blood culture is NOT considered a contaminant.   - Oxacillin: S 0.5   - Cefazolin: S <=4   - Penicillin: R >8   - RIF- Rifampin: S <=1 Should not be used as monotherapy   - Tetra/Doxy: S <=1   - Trimethoprim/Sulfamethoxazole: S <=0.5/9.5   - Vancomycin: S 1   Specimen Source: .Blood Blood-Venous   Organism: Blood Culture PCR   Organism: Staphylococcus aureus   Culture Results:   Growth in aerobic and anaerobic bottles: Staphylococcus aureus   RADIOLOGY:     Follow Up:  MSSA Bacteremia, Septic L Knee    Inverval History/ROS:Patient is a 71y old  Female who presents with a chief complaint of Left knee pain today (18 Jun 2021 09:07)    Pt had PICC Line placed today. Denies fever, chills, back pain. Left knee slowly improving, slightly improved ROM.    Allergies    No Known Allergies    Intolerances        ANTIMICROBIALS:  ceFAZolin   IVPB 2000 every 6 hours      OTHER MEDS:  acetaminophen   Tablet .. 650 milliGRAM(s) Oral every 6 hours PRN  atorvastatin 10 milliGRAM(s) Oral at bedtime  chlorhexidine 4% Liquid 1 Application(s) Topical daily  DULoxetine 60 milliGRAM(s) Oral daily  heparin   Injectable 5000 Unit(s) SubCutaneous every 8 hours  levothyroxine 25 MICROGram(s) Oral daily  oxyCODONE    IR 10 milliGRAM(s) Oral every 4 hours PRN  pantoprazole    Tablet 40 milliGRAM(s) Oral before breakfast      Vital Signs Last 24 Hrs  T(C): 37.7 (18 Jun 2021 04:08), Max: 37.7 (18 Jun 2021 04:08)  T(F): 99.8 (18 Jun 2021 04:08), Max: 99.8 (18 Jun 2021 04:08)  HR: 88 (18 Jun 2021 04:08) (88 - 98)  BP: 158/76 (18 Jun 2021 04:08) (155/82 - 158/76)  BP(mean): --  RR: 17 (18 Jun 2021 04:08) (17 - 17)  SpO2: 96% (18 Jun 2021 04:08) (96% - 96%)    Physical Exam  Gen: morbid obesity, non-toxic  Vascular: RUE PICC  Lungs: CTA B/L,  Heart: RRR,  Abdomen: soft, non-ttp  Ext: b/l lymphedema  Skin: chronic appearing changes to LLE                          12.0   17.78 )-----------( 410      ( 18 Jun 2021 08:58 )             37.3       06-18    131<L>  |  91<L>  |  13  ----------------------------<  125<H>  4.4   |  27  |  0.58    Ca    9.6      18 Jun 2021 08:58            MICROBIOLOGY:Culture Results:   No growth to date. (06-15 @ 09:09)  Culture Results:   No growth to date. (06-15 @ 09:09)  Culture Results:   No growth to date. (06-14 @ 21:03)  Culture Results:   No growth to date. (06-14 @ 21:03)    Culture - Body Fluid with Gram Stain (06.08.21 @ 22:17)   - Gentamicin: S <=1 Should not be used as monotherapy   - Ampicillin/Sulbactam: S <=8/4   - Oxacillin: S 0.5   - Penicillin: R >8   - RIF- Rifampin: S <=1 Should not be used as monotherapy   - Tetra/Doxy: S <=1   - Trimethoprim/Sulfamethoxazole: S <=0.5/9.5   - Vancomycin: S 1   Gram Stain:   No polymorphonuclear cells seen   No organisms seen   by cytocentrifuge   - Cefazolin: S <=4   - Clindamycin: S <=0.25   - Erythromycin: S <=0.25   Specimen Source: .Body Fluid Synovial Fluid   Culture Results:   Rare Staphylococcus aureus   Organism Identification: Staphylococcus aureus   Organism: Staphylococcus aureus   Method Type: CORTES Culture - Blood in AM (06.08.21 @ 14:36)   Specimen Source: .Blood Blood   Culture Results:   No Growth Final     Culture - Blood (06.07.21 @ 00:33)   Gram Stain:   Growth in aerobic bottle: Gram Positive Cocci in Clusters   Growth in anaerobic bottle: Gram Positive Cocci in Clusters   - Ampicillin/Sulbactam: S <=8/4   - Clindamycin: S <=0.25   - Erythromycin: S <=0.25   - Gentamicin: S <=1 Should not be used as monotherapy   - Staphylococcus aureus: Detec Any isolate of Staphylococcus aureus from a blood culture is NOT considered a contaminant.   - Oxacillin: S 0.5   - Cefazolin: S <=4   - Penicillin: R >8   - RIF- Rifampin: S <=1 Should not be used as monotherapy   - Tetra/Doxy: S <=1   - Trimethoprim/Sulfamethoxazole: S <=0.5/9.5   - Vancomycin: S 1   Specimen Source: .Blood Blood-Venous   Organism: Blood Culture PCR   Organism: Staphylococcus aureus   Culture Results:   Growth in aerobic and anaerobic bottles: Staphylococcus aureus

## 2021-06-18 NOTE — DISCHARGE NOTE PROVIDER - NSDCMRMEDTOKEN_GEN_ALL_CORE_FT
amLODIPine 10 mg oral tablet: 1 tab(s) orally once a day  atorvastatin 10 mg oral tablet: 1 tab(s) orally once a day  benazepril 40 mg oral tablet: 1 tab(s) orally once a day  calcium (as carbonate) 500 mg oral tablet: 1 tab(s) orally once a day (caltrate)  ceFAZolin: 2 gram(s) intravenous every 6 hours   to complete a 6 week course thru 7/19  Dx - MSSA bacteremia   ICD - A49.01  CBC CMP weekly  - to be follwed with [facility]  F/u with ID @Dr. [place] in 4 weeks    DULoxetine 60 mg oral delayed release capsule: 1 cap(s) orally once a day  levothyroxine 25 mcg (0.025 mg) oral tablet: 1 tab(s) orally once a day  Multiple Vitamins oral tablet: 1 tab(s) orally once a day (centrum)  Probiotic Formula oral capsule: 1 cap(s) orally once a day  Tylenol 325 mg oral tablet: 2 tab(s) orally 4 times a day, As Needed  Vitamin D3 1000 intl units (25 mcg) oral tablet: 1 tab(s) orally once a day  Voltaren 1% topical gel: Apply topically to affected area , As Needed knee pain   acetaminophen 325 mg oral tablet: 2 tab(s) orally every 6 hours, As needed, Temp greater or equal to 38C (100.4F)  amLODIPine 10 mg oral tablet: 1 tab(s) orally once a day  atorvastatin 10 mg oral tablet: 1 tab(s) orally once a day  atorvastatin 10 mg oral tablet: 1 tab(s) orally once a day (at bedtime)  benazepril 40 mg oral tablet: 1 tab(s) orally once a day  calcium (as carbonate) 500 mg oral tablet: 1 tab(s) orally once a day (caltrate)  ceFAZolin: 2 gram(s) intravenous every 6 hours   to complete a 6 week course thru 7/19  Dx - MSSA bacteremia   ICD - A49.01  CBC CMP weekly  - to be follwed with [facility]  F/u with ID @Dr. [place] in 4 weeks    clotrimazole 1% topical cream: 1 application topically 2 times a day  DULoxetine 60 mg oral delayed release capsule: 1 cap(s) orally once a day  DULoxetine 60 mg oral delayed release capsule: 1 cap(s) orally once a day  levothyroxine 25 mcg (0.025 mg) oral tablet: 1 tab(s) orally once a day  levothyroxine 25 mcg (0.025 mg) oral tablet: 1 tab(s) orally once a day  Multiple Vitamins oral tablet: 1 tab(s) orally once a day (centrum)  oxyCODONE 10 mg oral tablet: 1 tab(s) orally every 6 hours, As Needed - 6)  pantoprazole 40 mg oral delayed release tablet: 1 tab(s) orally once a day (before a meal)  Probiotic Formula oral capsule: 1 cap(s) orally once a day  triamcinolone 0.1% topical ointment: 1 application topically 2 times a day  Tylenol 325 mg oral tablet: 2 tab(s) orally 4 times a day, As Needed  Vitamin D3 1000 intl units (25 mcg) oral tablet: 1 tab(s) orally once a day  Voltaren 1% topical gel: Apply topically to affected area , As Needed knee pain   acetaminophen 325 mg oral tablet: 2 tab(s) orally every 6 hours, As needed, Temp greater or equal to 38C (100.4F)  amLODIPine 10 mg oral tablet: 1 tab(s) orally once a day  atorvastatin 10 mg oral tablet: 1 tab(s) orally once a day  atorvastatin 10 mg oral tablet: 1 tab(s) orally once a day (at bedtime)  benazepril 40 mg oral tablet: 1 tab(s) orally once a day  calcium (as carbonate) 500 mg oral tablet: 1 tab(s) orally once a day (caltrate)  ceFAZolin: 2 gram(s) intravenous every 6 hours   to complete a 6 week course thru 7/19  Dx - MSSA bacteremia   ICD - A49.01  CBC CMP weekly  - to be follwed with [facility]  F/u with ID @Dr. [place] in 4 weeks    clotrimazole 1% topical cream: 1 application topically 2 times a day through 7/1  DULoxetine 60 mg oral delayed release capsule: 1 cap(s) orally once a day  DULoxetine 60 mg oral delayed release capsule: 1 cap(s) orally once a day  levothyroxine 25 mcg (0.025 mg) oral tablet: 1 tab(s) orally once a day  levothyroxine 25 mcg (0.025 mg) oral tablet: 1 tab(s) orally once a day  Multiple Vitamins oral tablet: 1 tab(s) orally once a day (centrum)  oxyCODONE 10 mg oral tablet: 1 tab(s) orally every 6 hours, As Needed - 6)  pantoprazole 40 mg oral delayed release tablet: 1 tab(s) orally once a day (before a meal)  Probiotic Formula oral capsule: 1 cap(s) orally once a day  triamcinolone 0.1% topical ointment: 1 application topically 2 times a day through 7/5  Tylenol 325 mg oral tablet: 2 tab(s) orally 4 times a day, As Needed  Vitamin D3 1000 intl units (25 mcg) oral tablet: 1 tab(s) orally once a day  Voltaren 1% topical gel: Apply topically to affected area , As Needed knee pain   acetaminophen 325 mg oral tablet: 2 tab(s) orally every 6 hours, As needed, Temp greater or equal to 38C (100.4F)  atorvastatin 10 mg oral tablet: 1 tab(s) orally once a day (at bedtime)  ceFAZolin: 2 gram(s) intravenous every 6 hours   to complete a 6 week course thru 7/19  Dx - MSSA bacteremia   ICD - A49.01  CBC CMP weekly  - to be follwed with [facility]  F/u with ID @Dr. [place] in 4 weeks    clotrimazole 1% topical cream: 1 application topically 2 times a day through 7/1  DULoxetine 60 mg oral delayed release capsule: 1 cap(s) orally once a day  levothyroxine 25 mcg (0.025 mg) oral tablet: 1 tab(s) orally once a day  Multiple Vitamins oral tablet: 1 tab(s) orally once a day (centrum)  oxyCODONE 10 mg oral tablet: 1 tab(s) orally every 6 hours, As Needed - 6)  pantoprazole 40 mg oral delayed release tablet: 1 tab(s) orally once a day (before a meal)  senna oral tablet: 2 tab(s) orally once a day (at bedtime)  Hold for loose stool   triamcinolone 0.1% topical ointment: 1 application topically 2 times a day through 7/5

## 2021-06-18 NOTE — PROGRESS NOTE ADULT - SUBJECTIVE AND OBJECTIVE BOX
Patient is a 71y old  Female who presents with a chief complaint of Left knee pain today (08 Jun 2021 17:01)      SUBJECTIVE / OVERNIGHT EVENTS: no events over night     T(C): 37 (06-18-21 @ 14:52), Max: 37 (06-18-21 @ 14:52)  HR: 88 (06-18-21 @ 14:52) (88 - 88)  BP: 169/78 (06-18-21 @ 14:52) (169/78 - 169/78)  RR: 18 (06-18-21 @ 14:52) (18 - 18)  SpO2: 95% (06-18-21 @ 14:52) (95% - 95%)      MEDICATIONS  (STANDING):  atorvastatin 10 milliGRAM(s) Oral at bedtime  ceFAZolin   IVPB 2000 milliGRAM(s) IV Intermittent every 6 hours  chlorhexidine 4% Liquid 1 Application(s) Topical daily  DULoxetine 60 milliGRAM(s) Oral daily  heparin   Injectable 5000 Unit(s) SubCutaneous every 8 hours  levothyroxine 25 MICROGram(s) Oral daily  pantoprazole    Tablet 40 milliGRAM(s) Oral before breakfast    MEDICATIONS  (PRN):  acetaminophen   Tablet .. 650 milliGRAM(s) Oral every 6 hours PRN Temp greater or equal to 38C (100.4F)  oxyCODONE    IR 10 milliGRAM(s) Oral every 4 hours PRN Moderate Pain (4 - 6)      PHYSICAL EXAM:  GENERAL: NAD, well-developed  HEAD:  Atraumatic, Normocephalic  EYES: EOMI, conjunctiva and sclera clear  NECK: Supple, No JVD  CHEST/LUNG: Clear to auscultation bilaterally; No wheeze  HEART: Regular rate and rhythm; No murmurs, rubs, or gallops  ABDOMEN: Soft, Nontender, Nondistended; Bowel sounds present  EXTREMITIES:  Left knee swollen, erythematous   2+ Peripheral Pulses, No clubbing, cyanosis, or edema  PSYCH: AAOx3  NEUROLOGY: non-focal  SKIN: No rashes or lesions                                                          12.0   17.78 )-----------( 410      ( 18 Jun 2021 08:58 )             37.3               131|91|13<125  4.4|27|0.58  9.6,--,--  06-18 @ 08:58      CAPILLARY BLOOD GLUCOSE      RADIOLOGY & ADDITIONAL TESTS:    Imaging Personally Reviewed:    Consultant(s) Notes Reviewed:      Care Discussed with Consultants/Other Providers:

## 2021-06-18 NOTE — PROGRESS NOTE ADULT - SUBJECTIVE AND OBJECTIVE BOX
S: +L knee pain. Denies chest pain or SOB. Review of systems otherwise (-)    Constitutional: [ ] fevers, [ ] chills.   Skin: [ ] dry skin. [ ] rashes.  Psychiatric: [ ] depression, [ ] anxiety.   Gastrointestinal: [ ] BRBPR, [ ] melena.   Neurological: [ ] confusion. [ ] seizures. [ ] shuffling gait.   Ears,Nose,Mouth and Throat: [ ] ear pain [ ] sore throat.   Eyes: [ ] diplopia.   Respiratory: [ ] hemoptysis. [ ] shortness of breath  Cardiovascular: See HPI above  Hematologic/Lymphatic: [ ] anemia. [ ] painful nodes. [ ] prolonged bleeding.   Genitourinary: [ ] hematuria. [ ] flank pain.   Endocrine: [ ] significant change in weight. [ ] intolerance to heat and cold.     Review of systems [ x] otherwise negative, [ ] otherwise unable to obtain    FH: no family history of sudden cardiac death in first degree relatives    SH: [ ] tobacco, [ ] alcohol, [ ] drugs      MEDICATIONS  (STANDING):  atorvastatin 10 milliGRAM(s) Oral at bedtime  ceFAZolin   IVPB 2000 milliGRAM(s) IV Intermittent every 6 hours  chlorhexidine 4% Liquid 1 Application(s) Topical daily  DULoxetine 60 milliGRAM(s) Oral daily  heparin   Injectable 5000 Unit(s) SubCutaneous every 8 hours  levothyroxine 25 MICROGram(s) Oral daily  pantoprazole    Tablet 40 milliGRAM(s) Oral before breakfast    MEDICATIONS  (PRN):  acetaminophen   Tablet .. 650 milliGRAM(s) Oral every 6 hours PRN Temp greater or equal to 38C (100.4F)  oxyCODONE    IR 10 milliGRAM(s) Oral every 4 hours PRN Moderate Pain (4 - 6)      LABS:                          12.0   17.78 )-----------( 410      ( 18 Jun 2021 08:58 )             37.3     Hemoglobin: 12.0 g/dL (06-18 @ 08:58)  Hemoglobin: 11.3 g/dL (06-16 @ 07:11)  Hemoglobin: 11.8 g/dL (06-15 @ 07:15)  Hemoglobin: 11.6 g/dL (06-14 @ 06:36)    06-18    131<L>  |  91<L>  |  13  ----------------------------<  125<H>  4.4   |  27  |  0.58    Ca    9.6      18 Jun 2021 08:58      Creatinine Trend: 0.58<--, 0.57<--, 0.51<--, 0.51<--, 0.51<--, 0.53<--             06-17-21 @ 07:01  -  06-18-21 @ 07:00  --------------------------------------------------------  IN: 1474 mL / OUT: 2300 mL / NET: -826 mL        PHYSICAL EXAM  Vital Signs Last 24 Hrs  T(C): 37.7 (18 Jun 2021 04:08), Max: 37.7 (18 Jun 2021 04:08)  T(F): 99.8 (18 Jun 2021 04:08), Max: 99.8 (18 Jun 2021 04:08)  HR: 88 (18 Jun 2021 04:08) (88 - 98)  BP: 158/76 (18 Jun 2021 04:08) (155/82 - 158/76)  BP(mean): --  RR: 17 (18 Jun 2021 04:08) (17 - 17)  SpO2: 96% (18 Jun 2021 04:08) (96% - 96%)      Gen: Appears well in NAD  HEENT:  (-)icterus (-)pallor  CV: N S1 S2 1/6 DIETER (+)2 Pulses B/l  Resp:  Clear to auscultation B/L, normal effort  GI: (+) BS Soft, NT, ND  Lymph:  (+) B/L chronic LE Edema, (-)obvious lymphadenopathy  Skin: Warm to touch, Normal turgor  Psych: Appropriate mood and affect    TELEMETRY: None	      ECG: NSR, low voltage QRS     < from: TTE with Doppler (w/Cont) (06.08.21 @ 16:27) >  Conclusions:  Technically difficult study-valves not well visualized.  1. Endocardial visualization enhanced with intravenous  injection of Ultrasonic Enhancing Agent (Definity). Overall  preserved left ventricular ejection fraction. Wall motion  evaluation is limited.  2. No Valves visualized. Unable to exclude valvular  vegetations in the setting of a technically difficult  study.  3. DENIS could be obtained for further evaluation of valvular  vegetations, if clinical suspicion of endocarditis is high.    *** Noprevious Echo exam.    < end of copied text >      < from: DENIS w/o TTE (w/3D Echo) (06.16.21 @ 15:50) >  Conclusions:  1. Normal mitral valve. Mild mitral regurgitation.  2. Normal trileaflet aortic valve. Minimal aortic  regurgitation.  3. Normal left atrium.  No left atrium or left atrial  appendage thrombus. A membrane was seen in the left atrium  attached to the atrial septum consistent with left atrial  septal pouch.  4. Normal left ventricular systolic function. No segmental  wall motion abnormalities.  5. Normal right ventricular size and function.  6. No evidence of valvular vegetation is seen.    < end of copied text >  	    RADIOLOGY:         CXR: Clear lungs    ASSESSMENT/PLAN: Patient is a 70 y/o female with PMH of b/l lymphedema, morbid obesity, HTN, and hypothyroidism who presented with L knee pain and inability to walk concerning for septic joint and found to have staph aureus bacteremia. Cardiology consulted for preop clearance.     - No evidence of clinical HF or anginal symptoms  - Ortho follow up - s/p L knee I&D  - Abx per ID - s/p PICC  - TTE noted difficult study, preserved LVEF   - CT spine noted  - DENIS negative for endocarditis   - No further inpatient cardiac w/u planned    Paras Perez PA-C  Pager: [phone]

## 2021-06-18 NOTE — DISCHARGE NOTE PROVIDER - NSDCFUADDINST_GEN_ALL_CORE_FT
Follow-up with your primary care physician within 1 week. Call for appointment.  Please bring all discharge paperwork and list of medications to all follow up appointments  Please call for follow up appointments one day after discharge   Follow-up with your primary care physician within 1 week. Call for appointment.  Please bring all discharge paperwork and list of medications to all follow up appointments  Please call for follow up appointments one day after discharge  Will need to complete 6 weeks antibiotics   Check CBC, CMP weekly.  Followup in 4-6 weeks as outpt. ([phone])

## 2021-06-18 NOTE — DISCHARGE NOTE PROVIDER - CARE PROVIDER_API CALL
Christiano Lora)  Infectious Disease; Internal Medicine  [address] [place] [address]  Phone: [phone]  Fax: [phone]  Follow Up Time: 1 month    Ambrose Joshua)  Orthopaedic Surgery  [address] [place] [address] [address]  [place] [address]  Phone: [phone]  Fax: [phone]  Follow Up Time: 1 week

## 2021-06-18 NOTE — DISCHARGE NOTE PROVIDER - DETAILS OF MALNUTRITION DIAGNOSIS/DIAGNOSES
This patient has been assessed with a concern for Malnutrition and was treated during this hospitalization for the following Nutrition diagnosis/diagnoses:     -  06/09/2021: Morbid obesity (BMI > 40)

## 2021-06-18 NOTE — DISCHARGE NOTE PROVIDER - PROVIDER TOKENS
PROVIDER:[TOKEN:[[id number]],FOLLOWUP:[1 month]],PROVIDER:[TOKEN:[[id number]],FOLLOWUP:[1 week]]

## 2021-06-18 NOTE — DISCHARGE NOTE PROVIDER - HOSPITAL COURSE
70 y/o female with PMHx of b/l lymphedema, morbid obesity, HTN, hypothyrodism, ambulates with walker p/w 2 days left knee pain preventing her from walking. Patient was admitted for further medical management. Ortho was consulted recommending IR consult for US guided aspiration of the L knee for diagnostic purposes, as ortho unable to tap joint due to body habitus & patient cannot fit in MRI. Wound care was consulted recommending Advanced Cavilon M-W-F, and routine pericare between applications. Patient was found to have staph aureus bacteremia. ID was consulted - patient was started on cefazolin 2 gm iv q8h. Cardiology consulted for preop clearance. TTE noted difficult study, preserved LVEF. DENIS negative for endocarditis. Patient s/p washout in OR and Hemovac output was monitored. Hemovac was d/c'ed on 6/13. CBC noted with persistent leucocytosis. Repeat bcx 6/8, 6/14, 6/15 with no growth. PICC line was placed for 6 weeks of abx therapy (ending 7/15.) Patient will need CBC & CMP once a week while on OPAT. Pt had some tenderness at T-L spine junction - CT T and L spine without evidence of abscess, OM/discitis. OT was consulted recommending [facility].    F/u with ortho outpt  follow up with ID in 4 weeks in office.   Hold Norvasc. . 70 y/o female with PMHx of b/l lymphedema, morbid obesity, HTN, hypothyrodism, ambulates with walker p/w 2 days left knee pain preventing her from walking. Patient was admitted for further medical management. Ortho was consulted recommending IR consult for US guided aspiration of the L knee for diagnostic purposes, as ortho unable to tap joint due to body habitus & patient cannot fit in MRI. Wound care was consulted recommending Advanced Cavilon M-W-F, and routine pericare between applications. Patient was found to have staph aureus bacteremia. ID was consulted - patient was started on cefazolin 2 gm iv q8h. Cardiology consulted for preop clearance. TTE noted difficult study, preserved LVEF. DENIS negative for endocarditis. Patient s/p washout in OR and Hemovac output was monitored. Hemovac was d/c'ed on 6/13. CBC noted with persistent leucocytosis. Repeat bcx 6/8, 6/14, 6/15 with no growth. PICC line was placed for 6 weeks of abx therapy (ending 7/15.) Patient will need CBC & CMP once a week while on OPAT. Pt had some tenderness at T-L spine junction - CT T and L spine without evidence of abscess, OM/discitis. OT was consulted recommending [facility].    Follow up with ortho outpt  Follow up with ID in 4 weeks in office.   Hold [facility].   On 6/23  - pt. dc'd to [facility] and d/c plan and meds d/w Dr. Rodriguez

## 2021-06-18 NOTE — DISCHARGE NOTE PROVIDER - NSDCCPCAREPLAN_GEN_ALL_CORE_FT
PRINCIPAL DISCHARGE DIAGNOSIS  Diagnosis: Acute pain of left knee  Assessment and Plan of Treatment:       SECONDARY DISCHARGE DIAGNOSES  Diagnosis: Fever  Assessment and Plan of Treatment:      PRINCIPAL DISCHARGE DIAGNOSIS  Diagnosis: Acute pain of left knee  Assessment and Plan of Treatment: Septic Knee   Left knee pain- Reviewed imaging. Ortho consult appreciated.   s/p IR aspiration of Left Knee.    Patient s/p aspiration of Left Knee hemo vac drain removed.    S/P or Drainage.   Will follow up as an out-pt as directed         SECONDARY DISCHARGE DIAGNOSES  Diagnosis: Fever  Assessment and Plan of Treatment: Bacteremia   Continue with Ancef as per ID. PICC Discharge planning ALE   ID followed  TTE shows Endocardial visualization enhanced with intravenous  injection of Ultrasonic Enhancing Agent (Definity), preserved left ventricular ejection fraction.  DENIS no vegetations   will f/u with ID / Ortho       Diagnosis: HTN (hypertension)  Assessment and Plan of Treatment: HTN Continue with Lisinopril.   Hold Norvasc. .       Diagnosis: Hypothyroid  Assessment and Plan of Treatment: C/w Synthroid

## 2021-06-18 NOTE — PROGRESS NOTE ADULT - ASSESSMENT
70yo female b/l lymphedema, morbid obesity, HTN, hypothyrodism, ambulates with walker p/w 2 days L knee pain preventing her from walking today. Pt found to have MSSA bacteremia c/b septic left knee      MSSA Bacteremia, leucocytosis, lt knee septic arthritis: s/p wash out   -c/w Cefazolin at 2g q6 (dose increased based on body wt)  -repeat Bcx 6/8, 6/14, 6/15 NTD  -TTE: Valves not well visualized, cannot rule out vegetation. DENIS 6/16 without evidence of IE.  -Pt had some tenderness at T-L spine junction, now resolved. CT T and L spine without evidence of abscess, OM/discitis  -Ortho on board   -trend CBC for leucocytosis, decreasing, clinically improved  - PICC placed today    Stable from ID perspective for d/c to rehab, will need 6 week so antibiotics from neg Bcx (6/8) ending 7/15. Check CBC, CMP weekly.

## 2021-06-19 LAB
ANION GAP SERPL CALC-SCNC: 15 MMOL/L — SIGNIFICANT CHANGE UP (ref 5–17)
BUN SERPL-MCNC: 12 MG/DL — SIGNIFICANT CHANGE UP (ref 7–23)
CALCIUM SERPL-MCNC: 9.4 MG/DL — SIGNIFICANT CHANGE UP (ref 8.4–10.5)
CHLORIDE SERPL-SCNC: 92 MMOL/L — LOW (ref 96–108)
CO2 SERPL-SCNC: 26 MMOL/L — SIGNIFICANT CHANGE UP (ref 22–31)
CREAT SERPL-MCNC: 0.56 MG/DL — SIGNIFICANT CHANGE UP (ref 0.5–1.3)
CULTURE RESULTS: SIGNIFICANT CHANGE UP
CULTURE RESULTS: SIGNIFICANT CHANGE UP
GLUCOSE SERPL-MCNC: 111 MG/DL — HIGH (ref 70–99)
HCT VFR BLD CALC: 34.6 % — SIGNIFICANT CHANGE UP (ref 34.5–45)
HGB BLD-MCNC: 11 G/DL — LOW (ref 11.5–15.5)
MCHC RBC-ENTMCNC: 28.8 PG — SIGNIFICANT CHANGE UP (ref 27–34)
MCHC RBC-ENTMCNC: 31.8 GM/DL — LOW (ref 32–36)
MCV RBC AUTO: 90.6 FL — SIGNIFICANT CHANGE UP (ref 80–100)
NRBC # BLD: 0 /100 WBCS — SIGNIFICANT CHANGE UP (ref 0–0)
PLATELET # BLD AUTO: 421 K/UL — HIGH (ref 150–400)
POTASSIUM SERPL-MCNC: 4.2 MMOL/L — SIGNIFICANT CHANGE UP (ref 3.5–5.3)
POTASSIUM SERPL-SCNC: 4.2 MMOL/L — SIGNIFICANT CHANGE UP (ref 3.5–5.3)
RBC # BLD: 3.82 M/UL — SIGNIFICANT CHANGE UP (ref 3.8–5.2)
RBC # FLD: 13.7 % — SIGNIFICANT CHANGE UP (ref 10.3–14.5)
SODIUM SERPL-SCNC: 133 MMOL/L — LOW (ref 135–145)
SPECIMEN SOURCE: SIGNIFICANT CHANGE UP
SPECIMEN SOURCE: SIGNIFICANT CHANGE UP
WBC # BLD: 20.36 K/UL — HIGH (ref 3.8–10.5)
WBC # FLD AUTO: 20.36 K/UL — HIGH (ref 3.8–10.5)

## 2021-06-19 RX ADMIN — OXYCODONE HYDROCHLORIDE 10 MILLIGRAM(S): 5 TABLET ORAL at 12:30

## 2021-06-19 RX ADMIN — Medication 25 MICROGRAM(S): at 05:26

## 2021-06-19 RX ADMIN — OXYCODONE HYDROCHLORIDE 10 MILLIGRAM(S): 5 TABLET ORAL at 06:00

## 2021-06-19 RX ADMIN — Medication 100 MILLIGRAM(S): at 11:45

## 2021-06-19 RX ADMIN — ATORVASTATIN CALCIUM 10 MILLIGRAM(S): 80 TABLET, FILM COATED ORAL at 22:10

## 2021-06-19 RX ADMIN — HEPARIN SODIUM 5000 UNIT(S): 5000 INJECTION INTRAVENOUS; SUBCUTANEOUS at 20:28

## 2021-06-19 RX ADMIN — OXYCODONE HYDROCHLORIDE 10 MILLIGRAM(S): 5 TABLET ORAL at 05:27

## 2021-06-19 RX ADMIN — Medication 100 MILLIGRAM(S): at 23:56

## 2021-06-19 RX ADMIN — HEPARIN SODIUM 5000 UNIT(S): 5000 INJECTION INTRAVENOUS; SUBCUTANEOUS at 05:26

## 2021-06-19 RX ADMIN — PANTOPRAZOLE SODIUM 40 MILLIGRAM(S): 20 TABLET, DELAYED RELEASE ORAL at 05:27

## 2021-06-19 RX ADMIN — OXYCODONE HYDROCHLORIDE 10 MILLIGRAM(S): 5 TABLET ORAL at 23:36

## 2021-06-19 RX ADMIN — Medication 100 MILLIGRAM(S): at 05:26

## 2021-06-19 RX ADMIN — HEPARIN SODIUM 5000 UNIT(S): 5000 INJECTION INTRAVENOUS; SUBCUTANEOUS at 11:46

## 2021-06-19 RX ADMIN — CHLORHEXIDINE GLUCONATE 1 APPLICATION(S): 213 SOLUTION TOPICAL at 11:53

## 2021-06-19 RX ADMIN — DULOXETINE HYDROCHLORIDE 60 MILLIGRAM(S): 30 CAPSULE, DELAYED RELEASE ORAL at 11:45

## 2021-06-19 RX ADMIN — Medication 100 MILLIGRAM(S): at 17:22

## 2021-06-19 RX ADMIN — OXYCODONE HYDROCHLORIDE 10 MILLIGRAM(S): 5 TABLET ORAL at 11:44

## 2021-06-19 NOTE — PROGRESS NOTE ADULT - ASSESSMENT
70yo female b/l lymphedema, morbid obesity, HTN, hypothyrodism, ambulates with walker p/w 2 days L knee pain     Septic Knee   Left knee pain- Reviewed imaging. Ortho consult appreciated.     s/p IR aspiration of Left Knee.    Patient s/p aspiration of Left Knee hemo vac drain removed.    S/P or Drainage.     Bacteremia   Continue with Ancef as per ID.   ID following.   TTE shows Endocardial visualization enhanced with intravenous  injection of Ultrasonic Enhancing Agent (Definity), preserved left ventricular ejection fraction.  DENIS no vegetations     Hypothyroidism Continue with Synthroid     HTN Continue with Lisinopril.   Hold Norvasc. .     DVT ppx.

## 2021-06-19 NOTE — OCCUPATIONAL THERAPY INITIAL EVALUATION ADULT - LIVES WITH, PROFILE
Pt reports living in  alone with help of an aide. Pt reports being able to ambulate around house with RW and able to perform simple dressing, and toileting. Needs assistance for cooking/house maintenance and showering. Pt has shower chair, RTS, chair lift and wheelchair.

## 2021-06-19 NOTE — PROGRESS NOTE ADULT - SUBJECTIVE AND OBJECTIVE BOX
Patient is a 71y old  Female who presents with a chief complaint of Left knee pain today (08 Jun 2021 17:01)      SUBJECTIVE / OVERNIGHT EVENTS: no events over night     T(C): 37.2 (06-19-21 @ 20:12), Max: 37.2 (06-19-21 @ 20:12)  HR: 98 (06-19-21 @ 20:12) (77 - 98)  BP: 168/78 (06-19-21 @ 20:12) (121/90 - 168/78)  RR: 18 (06-19-21 @ 20:12) (18 - 18)  SpO2: 96% (06-19-21 @ 20:12) (96% - 96%)    MEDICATIONS  (STANDING):  atorvastatin 10 milliGRAM(s) Oral at bedtime  ceFAZolin   IVPB 2000 milliGRAM(s) IV Intermittent every 6 hours  chlorhexidine 4% Liquid 1 Application(s) Topical daily  DULoxetine 60 milliGRAM(s) Oral daily  heparin   Injectable 5000 Unit(s) SubCutaneous every 8 hours  levothyroxine 25 MICROGram(s) Oral daily  pantoprazole    Tablet 40 milliGRAM(s) Oral before breakfast    MEDICATIONS  (PRN):  acetaminophen   Tablet .. 650 milliGRAM(s) Oral every 6 hours PRN Temp greater or equal to 38C (100.4F)  oxyCODONE    IR 10 milliGRAM(s) Oral every 4 hours PRN Moderate Pain (4 - 6)                PHYSICAL EXAM:  GENERAL: NAD, well-developed  HEAD:  Atraumatic, Normocephalic  EYES: EOMI, conjunctiva and sclera clear  NECK: Supple, No JVD  CHEST/LUNG: Clear to auscultation bilaterally; No wheeze  HEART: Regular rate and rhythm; No murmurs, rubs, or gallops  ABDOMEN: Soft, Nontender, Nondistended; Bowel sounds present  EXTREMITIES:  Left knee swollen, erythematous   2+ Peripheral Pulses, No clubbing, cyanosis, or edema  PSYCH: AAOx3  NEUROLOGY: non-focal  SKIN: No rashes or lesions                                          11.0   20.36 )-----------( 421      ( 19 Jun 2021 07:13 )             34.6               133|92|12<111  4.2|26|0.56  9.4,--,--  06-19 @ 07:13      CAPILLARY BLOOD GLUCOSE      RADIOLOGY & ADDITIONAL TESTS:    Imaging Personally Reviewed:    Consultant(s) Notes Reviewed:      Care Discussed with Consultants/Other Providers:

## 2021-06-19 NOTE — PROGRESS NOTE ADULT - SUBJECTIVE AND OBJECTIVE BOX
S: pt seen and examined, no complaints, ROS - .     Constitutional: [ ] fevers, [ ] chills.   Skin: [ ] dry skin. [ ] rashes.  Psychiatric: [ ] depression, [ ] anxiety.   Gastrointestinal: [ ] BRBPR, [ ] melena.   Neurological: [ ] confusion. [ ] seizures. [ ] shuffling gait.   Ears,Nose,Mouth and Throat: [ ] ear pain [ ] sore throat.   Eyes: [ ] diplopia.   Respiratory: [ ] hemoptysis. [ ] shortness of breath  Cardiovascular: See HPI above  Hematologic/Lymphatic: [ ] anemia. [ ] painful nodes. [ ] prolonged bleeding.   Genitourinary: [ ] hematuria. [ ] flank pain.   Endocrine: [ ] significant change in weight. [ ] intolerance to heat and cold.     Review of systems [ x] otherwise negative, [ ] otherwise unable to obtain    FH: no family history of sudden cardiac death in first degree relatives    SH: [ ] tobacco, [ ] alcohol, [ ] drugs          acetaminophen   Tablet .. 650 milliGRAM(s) Oral every 6 hours PRN  atorvastatin 10 milliGRAM(s) Oral at bedtime  ceFAZolin   IVPB 2000 milliGRAM(s) IV Intermittent every 6 hours  chlorhexidine 4% Liquid 1 Application(s) Topical daily  DULoxetine 60 milliGRAM(s) Oral daily  heparin   Injectable 5000 Unit(s) SubCutaneous every 8 hours  levothyroxine 25 MICROGram(s) Oral daily  oxyCODONE    IR 10 milliGRAM(s) Oral every 4 hours PRN  pantoprazole    Tablet 40 milliGRAM(s) Oral before breakfast                            11.0   20.36 )-----------( 421      ( 19 Jun 2021 07:13 )             34.6       Hemoglobin: 11.0 g/dL (06-19 @ 07:13)  Hemoglobin: 12.0 g/dL (06-18 @ 08:58)  Hemoglobin: 11.3 g/dL (06-16 @ 07:11)  Hemoglobin: 11.8 g/dL (06-15 @ 07:15)      06-19    133<L>  |  92<L>  |  12  ----------------------------<  111<H>  4.2   |  26  |  0.56    Ca    9.4      19 Jun 2021 07:13      Creatinine Trend: 0.56<--, 0.58<--, 0.57<--, 0.51<--, 0.51<--, 0.51<--    COAGS:           T(C): 36.9 (06-19-21 @ 05:21), Max: 37 (06-18-21 @ 14:52)  HR: 82 (06-19-21 @ 05:21) (82 - 99)  BP: 148/85 (06-19-21 @ 05:21) (115/76 - 172/78)  RR: 17 (06-19-21 @ 05:21) (17 - 18)  SpO2: 96% (06-19-21 @ 05:21) (94% - 96%)  Wt(kg): --    I&O's Summary    18 Jun 2021 07:01  -  19 Jun 2021 07:00  --------------------------------------------------------  IN: 1160 mL / OUT: 1700 mL / NET: -540 mL        Gen: Appears well in NAD  HEENT:  (-)icterus (-)pallor  CV: N S1 S2 1/6 DIETER (+)2 Pulses B/l  Resp:  Clear to auscultation B/L, normal effort  GI: (+) BS Soft, NT, ND  Lymph:  (+) B/L chronic LE Edema, (-)obvious lymphadenopathy  Skin: Warm to touch, Normal turgor  Psych: Appropriate mood and affect    TELEMETRY: None	      ECG: NSR, low voltage QRS     < from: TTE with Doppler (w/Cont) (06.08.21 @ 16:27) >  Conclusions:  Technically difficult study-valves not well visualized.  1. Endocardial visualization enhanced with intravenous  injection of Ultrasonic Enhancing Agent (Definity). Overall  preserved left ventricular ejection fraction. Wall motion  evaluation is limited.  2. No Valves visualized. Unable to exclude valvular  vegetations in the setting of a technically difficult  study.  3. DENIS could be obtained for further evaluation of valvular  vegetations, if clinical suspicion of endocarditis is high.    *** Noprevious Echo exam.    < end of copied text >      < from: DENIS w/o TTE (w/3D Echo) (06.16.21 @ 15:50) >  Conclusions:  1. Normal mitral valve. Mild mitral regurgitation.  2. Normal trileaflet aortic valve. Minimal aortic  regurgitation.  3. Normal left atrium.  No left atrium or left atrial  appendage thrombus. A membrane was seen in the left atrium  attached to the atrial septum consistent with left atrial  septal pouch.  4. Normal left ventricular systolic function. No segmental  wall motion abnormalities.  5. Normal right ventricular size and function.  6. No evidence of valvular vegetation is seen.    < end of copied text >  	    RADIOLOGY:         CXR: Clear lungs    ASSESSMENT/PLAN: Patient is a 72 y/o female with PMH of b/l lymphedema, morbid obesity, HTN, and hypothyroidism who presented with L knee pain and inability to walk concerning for septic joint and found to have staph aureus bacteremia. Cardiology consulted for preop clearance.  S/p I&D of L knee on 6/10    - No evidence of clinical HF or anginal symptoms  - Ortho follow up - s/p L knee I&D  - Abx per ID - s/p PICC  - TTE noted difficult study, preserved LVEF   - CT spine noted  - DENIS negative for endocarditis   - No further inpatient cardiac w/u planned

## 2021-06-19 NOTE — OCCUPATIONAL THERAPY INITIAL EVALUATION ADULT - BED MOBILITY/TRANSFERS, PREVIOUS LEVEL OF FUNCTION, OT EVAL
Received a call from Suly ([phone]), patient's  from [facility].     Returned Suly's call and updated her as to medications are being adjusted. Suly shared patient has a wide variety of services in the community. Patient's community supports include: respite support, parent support, looking into other supports outside of parents (such as PCA), individual therapy (Critical access hospital), and a skills worker (Critical access hospital). [employer] will also be providing more in home supports. Patient also is in a EBD program at school with a trauma focus. Suly shared that the school knows her well and offers appropriate supports.     Suly would appreciate the Rolanda and patient's H&P faxed ([phone]. She would also like the discharge summary sent upon discharge.    independent

## 2021-06-19 NOTE — OCCUPATIONAL THERAPY INITIAL EVALUATION ADULT - PERTINENT HX OF CURRENT PROBLEM, REHAB EVAL
72yo female b/l lymphedema, morbid obesity, HTN, hypothyrodism, ambulates with walker p/w 2 days L knee pain preventing her from walking. Subjective fevers at home.

## 2021-06-20 LAB
CULTURE RESULTS: SIGNIFICANT CHANGE UP
CULTURE RESULTS: SIGNIFICANT CHANGE UP
SARS-COV-2 RNA SPEC QL NAA+PROBE: SIGNIFICANT CHANGE UP
SPECIMEN SOURCE: SIGNIFICANT CHANGE UP
SPECIMEN SOURCE: SIGNIFICANT CHANGE UP

## 2021-06-20 RX ORDER — SENNA PLUS 8.6 MG/1
2 TABLET ORAL AT BEDTIME
Refills: 0 | Status: DISCONTINUED | OUTPATIENT
Start: 2021-06-20 | End: 2021-06-23

## 2021-06-20 RX ORDER — POLYETHYLENE GLYCOL 3350 17 G/17G
17 POWDER, FOR SOLUTION ORAL ONCE
Refills: 0 | Status: COMPLETED | OUTPATIENT
Start: 2021-06-20 | End: 2021-06-20

## 2021-06-20 RX ADMIN — OXYCODONE HYDROCHLORIDE 10 MILLIGRAM(S): 5 TABLET ORAL at 12:20

## 2021-06-20 RX ADMIN — PANTOPRAZOLE SODIUM 40 MILLIGRAM(S): 20 TABLET, DELAYED RELEASE ORAL at 06:25

## 2021-06-20 RX ADMIN — Medication 25 MICROGRAM(S): at 05:06

## 2021-06-20 RX ADMIN — Medication 100 MILLIGRAM(S): at 05:05

## 2021-06-20 RX ADMIN — ATORVASTATIN CALCIUM 10 MILLIGRAM(S): 80 TABLET, FILM COATED ORAL at 22:05

## 2021-06-20 RX ADMIN — Medication 100 MILLIGRAM(S): at 17:28

## 2021-06-20 RX ADMIN — OXYCODONE HYDROCHLORIDE 10 MILLIGRAM(S): 5 TABLET ORAL at 15:38

## 2021-06-20 RX ADMIN — OXYCODONE HYDROCHLORIDE 10 MILLIGRAM(S): 5 TABLET ORAL at 00:10

## 2021-06-20 RX ADMIN — DULOXETINE HYDROCHLORIDE 60 MILLIGRAM(S): 30 CAPSULE, DELAYED RELEASE ORAL at 11:27

## 2021-06-20 RX ADMIN — OXYCODONE HYDROCHLORIDE 10 MILLIGRAM(S): 5 TABLET ORAL at 11:26

## 2021-06-20 RX ADMIN — SENNA PLUS 2 TABLET(S): 8.6 TABLET ORAL at 22:05

## 2021-06-20 RX ADMIN — OXYCODONE HYDROCHLORIDE 10 MILLIGRAM(S): 5 TABLET ORAL at 16:30

## 2021-06-20 RX ADMIN — Medication 100 MILLIGRAM(S): at 22:05

## 2021-06-20 RX ADMIN — HEPARIN SODIUM 5000 UNIT(S): 5000 INJECTION INTRAVENOUS; SUBCUTANEOUS at 11:26

## 2021-06-20 RX ADMIN — HEPARIN SODIUM 5000 UNIT(S): 5000 INJECTION INTRAVENOUS; SUBCUTANEOUS at 21:56

## 2021-06-20 RX ADMIN — OXYCODONE HYDROCHLORIDE 10 MILLIGRAM(S): 5 TABLET ORAL at 22:05

## 2021-06-20 RX ADMIN — CHLORHEXIDINE GLUCONATE 1 APPLICATION(S): 213 SOLUTION TOPICAL at 11:26

## 2021-06-20 RX ADMIN — HEPARIN SODIUM 5000 UNIT(S): 5000 INJECTION INTRAVENOUS; SUBCUTANEOUS at 04:51

## 2021-06-20 RX ADMIN — POLYETHYLENE GLYCOL 3350 17 GRAM(S): 17 POWDER, FOR SOLUTION ORAL at 18:07

## 2021-06-20 RX ADMIN — OXYCODONE HYDROCHLORIDE 10 MILLIGRAM(S): 5 TABLET ORAL at 22:45

## 2021-06-20 RX ADMIN — Medication 100 MILLIGRAM(S): at 11:26

## 2021-06-20 NOTE — PROGRESS NOTE ADULT - SUBJECTIVE AND OBJECTIVE BOX
Patient is a 71y old  Female who presents with a chief complaint of Left knee pain today (08 Jun 2021 17:01)      SUBJECTIVE / OVERNIGHT EVENTS: no events over night     T(C): 36.9 (06-20-21 @ 13:13), Max: 37.2 (06-20-21 @ 05:27)  HR: 88 (06-20-21 @ 13:13) (87 - 88)  BP: 158/85 (06-20-21 @ 13:13) (145/79 - 158/85)  RR: 18 (06-20-21 @ 13:13) (18 - 18)  SpO2: 95% (06-20-21 @ 13:13) (95% - 95%)    MEDICATIONS  (STANDING):  atorvastatin 10 milliGRAM(s) Oral at bedtime  ceFAZolin   IVPB 2000 milliGRAM(s) IV Intermittent every 6 hours  chlorhexidine 4% Liquid 1 Application(s) Topical daily  DULoxetine 60 milliGRAM(s) Oral daily  heparin   Injectable 5000 Unit(s) SubCutaneous every 8 hours  levothyroxine 25 MICROGram(s) Oral daily  pantoprazole    Tablet 40 milliGRAM(s) Oral before breakfast  senna 2 Tablet(s) Oral at bedtime    MEDICATIONS  (PRN):  acetaminophen   Tablet .. 650 milliGRAM(s) Oral every 6 hours PRN Temp greater or equal to 38C (100.4F)  oxyCODONE    IR 10 milliGRAM(s) Oral every 4 hours PRN Moderate Pain (4 - 6)  polyethylene glycol 3350 17 Gram(s) Oral once PRN Constipation      PHYSICAL EXAM:  GENERAL: NAD, well-developed  HEAD:  Atraumatic, Normocephalic  EYES: EOMI, conjunctiva and sclera clear  NECK: Supple, No JVD  CHEST/LUNG: Clear to auscultation bilaterally; No wheeze  HEART: Regular rate and rhythm; No murmurs, rubs, or gallops  ABDOMEN: Soft, Nontender, Nondistended; Bowel sounds present  EXTREMITIES:  Left knee swollen, erythematous   2+ Peripheral Pulses, No clubbing, cyanosis, or edema  PSYCH: AAOx3  NEUROLOGY: non-focal  SKIN: No rashes or lesions                                           11.0   20.36 )-----------( 421      ( 19 Jun 2021 07:13 )             34.6       CAPILLARY BLOOD GLUCOSE          CAPILLARY BLOOD GLUCOSE      RADIOLOGY & ADDITIONAL TESTS:    Imaging Personally Reviewed:    Consultant(s) Notes Reviewed:      Care Discussed with Consultants/Other Providers:

## 2021-06-20 NOTE — PROGRESS NOTE ADULT - SUBJECTIVE AND OBJECTIVE BOX
S: pt seen and examined, no complaints, ROS - .     Constitutional: [ ] fevers, [ ] chills.   Skin: [ ] dry skin. [ ] rashes.  Psychiatric: [ ] depression, [ ] anxiety.   Gastrointestinal: [ ] BRBPR, [ ] melena.   Neurological: [ ] confusion. [ ] seizures. [ ] shuffling gait.   Ears,Nose,Mouth and Throat: [ ] ear pain [ ] sore throat.   Eyes: [ ] diplopia.   Respiratory: [ ] hemoptysis. [ ] shortness of breath  Cardiovascular: See HPI above  Hematologic/Lymphatic: [ ] anemia. [ ] painful nodes. [ ] prolonged bleeding.   Genitourinary: [ ] hematuria. [ ] flank pain.   Endocrine: [ ] significant change in weight. [ ] intolerance to heat and cold.     Review of systems [ x] otherwise negative, [ ] otherwise unable to obtain    FH: no family history of sudden cardiac death in first degree relatives    SH: [ ] tobacco, [ ] alcohol, [ ] drugs         acetaminophen   Tablet .. 650 milliGRAM(s) Oral every 6 hours PRN  atorvastatin 10 milliGRAM(s) Oral at bedtime  ceFAZolin   IVPB 2000 milliGRAM(s) IV Intermittent every 6 hours  chlorhexidine 4% Liquid 1 Application(s) Topical daily  DULoxetine 60 milliGRAM(s) Oral daily  heparin   Injectable 5000 Unit(s) SubCutaneous every 8 hours  levothyroxine 25 MICROGram(s) Oral daily  oxyCODONE    IR 10 milliGRAM(s) Oral every 4 hours PRN  pantoprazole    Tablet 40 milliGRAM(s) Oral before breakfast                            11.0   20.36 )-----------( 421      ( 19 Jun 2021 07:13 )             34.6       Hemoglobin: 11.0 g/dL (06-19 @ 07:13)  Hemoglobin: 12.0 g/dL (06-18 @ 08:58)  Hemoglobin: 11.3 g/dL (06-16 @ 07:11)  Hemoglobin: 11.8 g/dL (06-15 @ 07:15)      06-19    133<L>  |  92<L>  |  12  ----------------------------<  111<H>  4.2   |  26  |  0.56    Ca    9.4      19 Jun 2021 07:13      Creatinine Trend: 0.56<--, 0.58<--, 0.57<--, 0.51<--, 0.51<--, 0.51<--    COAGS:           T(C): 37.2 (06-19-21 @ 20:12), Max: 37.2 (06-19-21 @ 20:12)  HR: 98 (06-19-21 @ 20:12) (77 - 98)  BP: 168/78 (06-19-21 @ 20:12) (121/90 - 168/78)  RR: 18 (06-19-21 @ 20:12) (17 - 18)  SpO2: 96% (06-19-21 @ 20:12) (96% - 96%)  Wt(kg): --    I&O's Summary    18 Jun 2021 07:01  -  19 Jun 2021 07:00  --------------------------------------------------------  IN: 1160 mL / OUT: 1700 mL / NET: -540 mL    19 Jun 2021 07:01  -  20 Jun 2021 04:29  --------------------------------------------------------  IN: 1280 mL / OUT: 2300 mL / NET: -1020 mL        Gen: Appears well in NAD  HEENT:  (-)icterus (-)pallor  CV: N S1 S2 1/6 DIETER (+)2 Pulses B/l  Resp:  Clear to auscultation B/L, normal effort  GI: (+) BS Soft, NT, ND  Lymph:  (+) B/L chronic LE Edema, (-)obvious lymphadenopathy  Skin: Warm to touch, Normal turgor  Psych: Appropriate mood and affect    TELEMETRY: None	      ECG: NSR, low voltage QRS     < from: TTE with Doppler (w/Cont) (06.08.21 @ 16:27) >  Conclusions:  Technically difficult study-valves not well visualized.  1. Endocardial visualization enhanced with intravenous  injection of Ultrasonic Enhancing Agent (Definity). Overall  preserved left ventricular ejection fraction. Wall motion  evaluation is limited.  2. No Valves visualized. Unable to exclude valvular  vegetations in the setting of a technically difficult  study.  3. DENIS could be obtained for further evaluation of valvular  vegetations, if clinical suspicion of endocarditis is high.    *** Noprevious Echo exam.    < end of copied text >      < from: DENIS w/o TTE (w/3D Echo) (06.16.21 @ 15:50) >  Conclusions:  1. Normal mitral valve. Mild mitral regurgitation.  2. Normal trileaflet aortic valve. Minimal aortic  regurgitation.  3. Normal left atrium.  No left atrium or left atrial  appendage thrombus. A membrane was seen in the left atrium  attached to the atrial septum consistent with left atrial  septal pouch.  4. Normal left ventricular systolic function. No segmental  wall motion abnormalities.  5. Normal right ventricular size and function.  6. No evidence of valvular vegetation is seen.    < end of copied text >  	    RADIOLOGY:         CXR: Clear lungs    ASSESSMENT/PLAN: Patient is a 70 y/o female with PMH of b/l lymphedema, morbid obesity, HTN, and hypothyroidism who presented with L knee pain and inability to walk concerning for septic joint and found to have staph aureus bacteremia. Cardiology consulted for preop clearance.  S/p I&D of L knee on 6/10    - No evidence of clinical HF or anginal symptoms  - Ortho follow up - s/p L knee I&D  - Abx per ID - s/p PICC  - TTE noted difficult study, preserved LVEF   - CT spine noted  - DENIS negative for endocarditis   - No further inpatient cardiac w/u planned

## 2021-06-20 NOTE — PROGRESS NOTE ADULT - ASSESSMENT
72yo female b/l lymphedema, morbid obesity, HTN, hypothyrodism, ambulates with walker p/w 2 days L knee pain     Septic Knee   Left knee pain- Reviewed imaging. Ortho consult appreciated.     s/p IR aspiration of Left Knee.    Patient s/p aspiration of Left Knee hemo vac drain removed.    S/P or Drainage.     Bacteremia   Continue with Ancef as per ID. PICC Discharge planning ALE   ID following.   TTE shows Endocardial visualization enhanced with intravenous  injection of Ultrasonic Enhancing Agent (Definity), preserved left ventricular ejection fraction.  DENIS no vegetations     Hypothyroidism Continue with Synthroid     HTN Continue with Lisinopril.   Hold Norvasc. .     DVT ppx.

## 2021-06-21 LAB
ANION GAP SERPL CALC-SCNC: 13 MMOL/L — SIGNIFICANT CHANGE UP (ref 5–17)
BUN SERPL-MCNC: 13 MG/DL — SIGNIFICANT CHANGE UP (ref 7–23)
CALCIUM SERPL-MCNC: 9.3 MG/DL — SIGNIFICANT CHANGE UP (ref 8.4–10.5)
CHLORIDE SERPL-SCNC: 94 MMOL/L — LOW (ref 96–108)
CO2 SERPL-SCNC: 26 MMOL/L — SIGNIFICANT CHANGE UP (ref 22–31)
CREAT SERPL-MCNC: 0.54 MG/DL — SIGNIFICANT CHANGE UP (ref 0.5–1.3)
GLUCOSE SERPL-MCNC: 114 MG/DL — HIGH (ref 70–99)
HCT VFR BLD CALC: 34.1 % — LOW (ref 34.5–45)
HGB BLD-MCNC: 10.6 G/DL — LOW (ref 11.5–15.5)
MCHC RBC-ENTMCNC: 28.5 PG — SIGNIFICANT CHANGE UP (ref 27–34)
MCHC RBC-ENTMCNC: 31.1 GM/DL — LOW (ref 32–36)
MCV RBC AUTO: 91.7 FL — SIGNIFICANT CHANGE UP (ref 80–100)
NRBC # BLD: 0 /100 WBCS — SIGNIFICANT CHANGE UP (ref 0–0)
PLATELET # BLD AUTO: 448 K/UL — HIGH (ref 150–400)
POTASSIUM SERPL-MCNC: 4.1 MMOL/L — SIGNIFICANT CHANGE UP (ref 3.5–5.3)
POTASSIUM SERPL-SCNC: 4.1 MMOL/L — SIGNIFICANT CHANGE UP (ref 3.5–5.3)
RBC # BLD: 3.72 M/UL — LOW (ref 3.8–5.2)
RBC # FLD: 13.9 % — SIGNIFICANT CHANGE UP (ref 10.3–14.5)
SODIUM SERPL-SCNC: 133 MMOL/L — LOW (ref 135–145)
WBC # BLD: 19.42 K/UL — HIGH (ref 3.8–10.5)
WBC # FLD AUTO: 19.42 K/UL — HIGH (ref 3.8–10.5)

## 2021-06-21 PROCEDURE — 99223 1ST HOSP IP/OBS HIGH 75: CPT

## 2021-06-21 PROCEDURE — 99233 SBSQ HOSP IP/OBS HIGH 50: CPT

## 2021-06-21 RX ADMIN — OXYCODONE HYDROCHLORIDE 10 MILLIGRAM(S): 5 TABLET ORAL at 07:00

## 2021-06-21 RX ADMIN — HEPARIN SODIUM 5000 UNIT(S): 5000 INJECTION INTRAVENOUS; SUBCUTANEOUS at 19:42

## 2021-06-21 RX ADMIN — Medication 25 MICROGRAM(S): at 05:34

## 2021-06-21 RX ADMIN — OXYCODONE HYDROCHLORIDE 10 MILLIGRAM(S): 5 TABLET ORAL at 19:45

## 2021-06-21 RX ADMIN — Medication 100 MILLIGRAM(S): at 17:08

## 2021-06-21 RX ADMIN — Medication 1 APPLICATION(S): at 17:08

## 2021-06-21 RX ADMIN — PANTOPRAZOLE SODIUM 40 MILLIGRAM(S): 20 TABLET, DELAYED RELEASE ORAL at 05:35

## 2021-06-21 RX ADMIN — OXYCODONE HYDROCHLORIDE 10 MILLIGRAM(S): 5 TABLET ORAL at 20:30

## 2021-06-21 RX ADMIN — Medication 100 MILLIGRAM(S): at 05:34

## 2021-06-21 RX ADMIN — OXYCODONE HYDROCHLORIDE 10 MILLIGRAM(S): 5 TABLET ORAL at 15:23

## 2021-06-21 RX ADMIN — Medication 1 APPLICATION(S): at 23:17

## 2021-06-21 RX ADMIN — OXYCODONE HYDROCHLORIDE 10 MILLIGRAM(S): 5 TABLET ORAL at 14:46

## 2021-06-21 RX ADMIN — HEPARIN SODIUM 5000 UNIT(S): 5000 INJECTION INTRAVENOUS; SUBCUTANEOUS at 05:33

## 2021-06-21 RX ADMIN — ATORVASTATIN CALCIUM 10 MILLIGRAM(S): 80 TABLET, FILM COATED ORAL at 23:16

## 2021-06-21 RX ADMIN — OXYCODONE HYDROCHLORIDE 10 MILLIGRAM(S): 5 TABLET ORAL at 05:34

## 2021-06-21 RX ADMIN — Medication 100 MILLIGRAM(S): at 23:15

## 2021-06-21 RX ADMIN — HEPARIN SODIUM 5000 UNIT(S): 5000 INJECTION INTRAVENOUS; SUBCUTANEOUS at 11:31

## 2021-06-21 RX ADMIN — CHLORHEXIDINE GLUCONATE 1 APPLICATION(S): 213 SOLUTION TOPICAL at 11:34

## 2021-06-21 RX ADMIN — Medication 100 MILLIGRAM(S): at 11:30

## 2021-06-21 RX ADMIN — SENNA PLUS 2 TABLET(S): 8.6 TABLET ORAL at 23:16

## 2021-06-21 RX ADMIN — DULOXETINE HYDROCHLORIDE 60 MILLIGRAM(S): 30 CAPSULE, DELAYED RELEASE ORAL at 11:31

## 2021-06-21 NOTE — PROGRESS NOTE ADULT - ASSESSMENT
70yo female b/l lymphedema, morbid obesity, HTN, hypothyrodism, ambulates with walker p/w 2 days L knee pain preventing her from walking today. Pt found to have MSSA bacteremia c/b septic left knee      MSSA Bacteremia, persistent and rising leucocytosis, lt knee septic arthritis, new rash: s/p wash out     Plan:   -c/w Cefazolin at 2g q6 (dose increased based on body wt)  -repeat Bcx 6/8, 6/14, 6/15 NTD  -TTE: Valves not well visualized, cannot rule out vegetation. DENIS 6/16 without evidence of IE.  -Pt had some tenderness at T-L spine junction, now resolved. CT T and L spine without evidence of abscess, OM/discitis  -Ortho on board   -trend CBC for leucocytosis, persists  -s/p PICC   -rash appears more intertriginous and localized rather than diffuse, doubt drug rash, consider topical clotrimazole, derm eval  -recommend CT chest/abd/pelvis with contrast to look for occult source of infection given persistent leucocytosis, pt refusing, adamant she does not want it, i explained to her the reasoning behind the recommendation and the fact we might be missing an abscess or occult source, she understands and verbalizes, still refusing, HHA at bedside.   -another set of blood cx in lab     will need 6 week so antibiotics from neg Bcx (6/8) ending 7/15. Check CBC, CMP weekly.  pt to follow in 4-6 weeks as outpt.       Plan discussed with Medicine NP      Christiano [place]  Pager: [phone]. If no response or past 5 pm call [phone].

## 2021-06-21 NOTE — PROGRESS NOTE ADULT - SUBJECTIVE AND OBJECTIVE BOX
71y old  Female who presents with a chief complaint of Left knee pain today (21 Jun 2021 17:24)      Interval history:  Afebrile, offers no new complains except rash on saturday on lt side       Allergies:   No Known Allergies    Antimicrobials:  ceFAZolin   IVPB 2000 milliGRAM(s) IV Intermittent every 6 hours      REVIEW OF SYSTEMS:  No chest pain   No SOB  No abdominal pain  No dysuria        Vital Signs Last 24 Hrs  T(C): 36.8 (06-21-21 @ 13:08), Max: 37.3 (06-20-21 @ 21:13)  T(F): 98.3 (06-21-21 @ 13:08), Max: 99.2 (06-20-21 @ 21:13)  HR: 78 (06-21-21 @ 13:08) (78 - 93)  BP: 148/75 (06-21-21 @ 13:08) (135/78 - 150/85)  BP(mean): --  RR: 18 (06-21-21 @ 13:08) (17 - 18)  SpO2: 94% (06-21-21 @ 13:08) (94% - 94%)      PHYSICAL EXAM:  Physical Exam  Gen: morbid obesity, non-toxic  Vascular: RUE PICC  Lungs: CTA B/L,  Heart: RRR,  Abdomen: soft, non-ttp  Ext: b/l lymphedema  Skin: chronic appearing changes to LLE  maculopapular itchy rash in axilla, lt>rt, some on the lt waist                             10.6   19.42 )-----------( 448      ( 21 Jun 2021 06:44 )             34.1   06-21    133<L>  |  94<L>  |  13  ----------------------------<  114<H>  4.1   |  26  |  0.54    Ca    9.3      21 Jun 2021 06:44      Culture - Blood in AM (06.15.21 @ 09:09)   Specimen Source: .Blood Blood-Peripheral   Culture Results:   No Growth Final

## 2021-06-21 NOTE — CONSULT NOTE ADULT - SUBJECTIVE AND OBJECTIVE BOX
HPI:  70yo female b/l lymphedema, morbid obesity, HTN, hypothyrodism, ambulates with walker p/w 2 days L knee pain preventing her from walking today.  No hx trauma/  fall/ fevers.      (07 Jun 2021 00:24)      CONSULT REASON/DERM HPI:  rash on L axilla and L flank x few days. Itchy.    PAST MEDICAL & SURGICAL HISTORY:  Lymphedema    HTN (hypertension)    Hypothyroidism    Morbid obesity    No significant past surgical history      REVIEW OF SYSTEMS:  Constitutional: denies fevers, chills  HEENT: odynophagia or dysphagia  Cardiovascular: denies palpitations  Respiratory: denies SOB, wheezing  Gastrointestinal: denies N/V/D, abdominal pain  : denies dysuria  MSK: denies weakness, joint pain  Endocrine: denies cold intolerance  Hematologic: denies bleeding  Neurologic: denies numbness    MEDICATIONS  (STANDING):  atorvastatin 10 milliGRAM(s) Oral at bedtime  ceFAZolin   IVPB 2000 milliGRAM(s) IV Intermittent every 6 hours  chlorhexidine 4% Liquid 1 Application(s) Topical daily  clotrimazole 1% Cream 1 Application(s) Topical two times a day  DULoxetine 60 milliGRAM(s) Oral daily  heparin   Injectable 5000 Unit(s) SubCutaneous every 8 hours  levothyroxine 25 MICROGram(s) Oral daily  pantoprazole    Tablet 40 milliGRAM(s) Oral before breakfast  senna 2 Tablet(s) Oral at bedtime    ALLERGIES: No Known Allergies    SOCIAL HISTORY: denies drug use  FAMILY HISTORY:    VITAL SIGNS LAST 24 HOURS:  T(F): 98.3 (06-21 @ 13:08), Max: 99.2 (06-20 @ 21:13)  HR: 78 (06-21 @ 13:08) (78 - 93)  BP: 148/75 (06-21 @ 13:08) (135/78 - 150/85)  RR: 18 (06-21 @ 13:08) (17 - 18)    PHYSICAL EXAM:  General: NAD, well nourished  HEENT: normocephalic, thyroid not enlarged, conjunctiva not injected, oropharynx clear without ulcerations  CV: Extremities warm and well perfused, +DP pulses  Resp: non laboured breathing, no clubbing of extremities  GI: belly not distended, no palpable HSM  Lymph: no palpable LAD  Neuro: A&O x 3  Skin: The scalp/hair, head/face, conjunctivae/lids, lips/teeth/gums, neck, digits/nails, right and left axilla, right and left upper and lower extremities, chest, abdomen, back, buttocks and groin area. No bromohidrosis or hyperhidrosis.   Notable skin findings are documented below:        LABS:  WBC Count : 19.42 K/uL   Hemoglobin : 10.6 g/dL   Hematocrit : 34.1 %   Platelet Count - Automated : 448 K/uL    133<L>  |  94<L>  |  13  ----------------------------<  114<H>  4.1   |  26  |  0.54    RADIOLOGY & ADDITIONAL STUDIES: no relevant studies HPI:  72yo female b/l lymphedema, morbid obesity, HTN, hypothyrodism, ambulates with walker p/w 2 days L knee pain preventing her from walking today.  No hx trauma/  fall/ fevers.      (07 Jun 2021 00:24)    CONSULT REASON/DERM HPI:  rash on L axilla and L flank x few days. Itchy.    PAST MEDICAL & SURGICAL HISTORY:  Lymphedema    HTN (hypertension)    Hypothyroidism    Morbid obesity    No significant past surgical history      REVIEW OF SYSTEMS:  Constitutional: denies fevers, chills  HEENT: odynophagia or dysphagia  Cardiovascular: denies palpitations  Respiratory: denies SOB, wheezing  Gastrointestinal: denies N/V/D, abdominal pain  : denies dysuria  MSK: denies weakness, joint pain  Endocrine: denies cold intolerance  Hematologic: denies bleeding  Neurologic: denies numbness    MEDICATIONS  (STANDING):  atorvastatin 10 milliGRAM(s) Oral at bedtime  ceFAZolin   IVPB 2000 milliGRAM(s) IV Intermittent every 6 hours  chlorhexidine 4% Liquid 1 Application(s) Topical daily  clotrimazole 1% Cream 1 Application(s) Topical two times a day  DULoxetine 60 milliGRAM(s) Oral daily  heparin   Injectable 5000 Unit(s) SubCutaneous every 8 hours  levothyroxine 25 MICROGram(s) Oral daily  pantoprazole    Tablet 40 milliGRAM(s) Oral before breakfast  senna 2 Tablet(s) Oral at bedtime    ALLERGIES: No Known Allergies    SOCIAL HISTORY: denies drug use  FAMILY HISTORY:    VITAL SIGNS LAST 24 HOURS:  T(F): 98.3 (06-21 @ 13:08), Max: 99.2 (06-20 @ 21:13)  HR: 78 (06-21 @ 13:08) (78 - 93)  BP: 148/75 (06-21 @ 13:08) (135/78 - 150/85)  RR: 18 (06-21 @ 13:08) (17 - 18)    PHYSICAL EXAM:  General: NAD, well nourished  HEENT: normocephalic, thyroid not enlarged, conjunctiva not injected, oropharynx clear without ulcerations  CV: Extremities warm and well perfused, +DP pulses  Resp: non laboured breathing, no clubbing of extremities  GI: belly not distended, no palpable HSM  Lymph: no palpable LAD  Neuro: A&O x 3  Skin: The scalp/hair, head/face, conjunctivae/lids, lips/teeth/gums, neck, digits/nails, right and left axilla, right and left upper and lower extremities, chest, abdomen, back, buttocks and groin area. No bromohidrosis or hyperhidrosis.   Notable skin findings are documented below:    blanching erythematous macules, papules, and patches in L axilla and L flank    LABS:  WBC Count : 19.42 K/uL   Hemoglobin : 10.6 g/dL   Hematocrit : 34.1 %   Platelet Count - Automated : 448 K/uL    133<L>  |  94<L>  |  13  ----------------------------<  114<H>  4.1   |  26  |  0.54    RADIOLOGY & ADDITIONAL STUDIES: no relevant studies

## 2021-06-21 NOTE — PROGRESS NOTE ADULT - SUBJECTIVE AND OBJECTIVE BOX
S: c/o itchiness from rash on left side of back. Denies chest pain or SOB. Review of systems otherwise (-)    Constitutional: [ ] fevers, [ ] chills.   Skin: [ ] dry skin. [ ] rashes.  Psychiatric: [ ] depression, [ ] anxiety.   Gastrointestinal: [ ] BRBPR, [ ] melena.   Neurological: [ ] confusion. [ ] seizures. [ ] shuffling gait.   Ears,Nose,Mouth and Throat: [ ] ear pain [ ] sore throat.   Eyes: [ ] diplopia.   Respiratory: [ ] hemoptysis. [ ] shortness of breath  Cardiovascular: See HPI above  Hematologic/Lymphatic: [ ] anemia. [ ] painful nodes. [ ] prolonged bleeding.   Genitourinary: [ ] hematuria. [ ] flank pain.   Endocrine: [ ] significant change in weight. [ ] intolerance to heat and cold.     Review of systems [ x] otherwise negative, [ ] otherwise unable to obtain    FH: no family history of sudden cardiac death in first degree relatives    SH: [ ] tobacco, [ ] alcohol, [ ] drugs      MEDICATIONS  (STANDING):  atorvastatin 10 milliGRAM(s) Oral at bedtime  ceFAZolin   IVPB 2000 milliGRAM(s) IV Intermittent every 6 hours  chlorhexidine 4% Liquid 1 Application(s) Topical daily  clotrimazole 1% Cream 1 Application(s) Topical two times a day  DULoxetine 60 milliGRAM(s) Oral daily  heparin   Injectable 5000 Unit(s) SubCutaneous every 8 hours  levothyroxine 25 MICROGram(s) Oral daily  pantoprazole    Tablet 40 milliGRAM(s) Oral before breakfast  senna 2 Tablet(s) Oral at bedtime    MEDICATIONS  (PRN):  acetaminophen   Tablet .. 650 milliGRAM(s) Oral every 6 hours PRN Temp greater or equal to 38C (100.4F)  oxyCODONE    IR 10 milliGRAM(s) Oral every 4 hours PRN Moderate Pain (4 - 6)      LABS:                          10.6   19.42 )-----------( 448      ( 21 Jun 2021 06:44 )             34.1     Hemoglobin: 10.6 g/dL (06-21 @ 06:44)  Hemoglobin: 11.0 g/dL (06-19 @ 07:13)  Hemoglobin: 12.0 g/dL (06-18 @ 08:58)    06-21    133<L>  |  94<L>  |  13  ----------------------------<  114<H>  4.1   |  26  |  0.54    Ca    9.3      21 Jun 2021 06:44      Creatinine Trend: 0.54<--, 0.56<--, 0.58<--, 0.57<--, 0.51<--, 0.51<--             06-20-21 @ 07:01  -  06-21-21 @ 07:00  --------------------------------------------------------  IN: 1400 mL / OUT: 950 mL / NET: 450 mL        PHYSICAL EXAM  Vital Signs Last 24 Hrs  T(C): 36.8 (21 Jun 2021 13:08), Max: 37.3 (20 Jun 2021 21:13)  T(F): 98.3 (21 Jun 2021 13:08), Max: 99.2 (20 Jun 2021 21:13)  HR: 78 (21 Jun 2021 13:08) (78 - 93)  BP: 148/75 (21 Jun 2021 13:08) (135/78 - 150/85)  BP(mean): --  RR: 18 (21 Jun 2021 13:08) (17 - 18)  SpO2: 94% (21 Jun 2021 13:08) (94% - 94%)          Gen: Appears well in NAD  HEENT:  (-)icterus (-)pallor  CV: N S1 S2 1/6 DIETER (+)2 Pulses B/l  Resp:  Clear to auscultation B/L, normal effort  GI: (+) BS Soft, NT, ND  Lymph:  (+) B/L chronic LE Edema, (-)obvious lymphadenopathy  Skin: Warm to touch, Normal turgor  Psych: Appropriate mood and affect    TELEMETRY: None	      ECG: NSR, low voltage QRS     < from: TTE with Doppler (w/Cont) (06.08.21 @ 16:27) >  Conclusions:  Technically difficult study-valves not well visualized.  1. Endocardial visualization enhanced with intravenous  injection of Ultrasonic Enhancing Agent (Definity). Overall  preserved left ventricular ejection fraction. Wall motion  evaluation is limited.  2. No Valves visualized. Unable to exclude valvular  vegetations in the setting of a technically difficult  study.  3. DENIS could be obtained for further evaluation of valvular  vegetations, if clinical suspicion of endocarditis is high.    *** Noprevious Echo exam.    < end of copied text >      < from: DENIS w/o TTE (w/3D Echo) (06.16.21 @ 15:50) >  Conclusions:  1. Normal mitral valve. Mild mitral regurgitation.  2. Normal trileaflet aortic valve. Minimal aortic  regurgitation.  3. Normal left atrium.  No left atrium or left atrial  appendage thrombus. A membrane was seen in the left atrium  attached to the atrial septum consistent with left atrial  septal pouch.  4. Normal left ventricular systolic function. No segmental  wall motion abnormalities.  5. Normal right ventricular size and function.  6. No evidence of valvular vegetation is seen.    < end of copied text >  	    RADIOLOGY:         CXR: Clear lungs    ASSESSMENT/PLAN: Patient is a 72 y/o female with PMH of b/l lymphedema, morbid obesity, HTN, and hypothyroidism who presented with L knee pain and inability to walk concerning for septic joint and found to have staph aureus bacteremia. Cardiology consulted for preop clearance.  S/p I&D of L knee on 6/10    - No evidence of clinical HF or anginal symptoms  - Ortho follow up - s/p L knee I&D  - Abx per ID - s/p PICC  - TTE noted difficult study, preserved LVEF   - CT spine noted  - DENIS negative for endocarditis   - Management/Eval of rash per med (d/w med PA)  - No further inpatient cardiac w/u planned    Paras Perez PA-C  Pager: [phone]

## 2021-06-21 NOTE — PROGRESS NOTE ADULT - ASSESSMENT
72yo female b/l lymphedema, morbid obesity, HTN, hypothyrodism, ambulates with walker p/w 2 days L knee pain         Leukocytosis   Fungal Rash     Anti fungals. Follow up blood cultures.        Septic Knee   Left knee pain- Reviewed imaging. Ortho consult appreciated.     s/p IR aspiration of Left Knee.    Patient s/p aspiration of Left Knee hemo vac drain removed.    S/P or Drainage.     Bacteremia   Continue with Ancef as per ID. PICC Discharge planning ALE   ID following.   TTE shows Endocardial visualization enhanced with intravenous  injection of Ultrasonic Enhancing Agent (Definity), preserved left ventricular ejection fraction.  DENIS no vegetations     Hypothyroidism Continue with Synthroid     HTN Continue with Lisinopril.   Hold Norvasc. .     DVT ppx.

## 2021-06-21 NOTE — PROGRESS NOTE ADULT - ATTENDING COMMENTS
70yo female b/l lymphedema, morbid obesity, HTN, hypothyrodism, ambulates with walker p/w 2 days L knee pain preventing her from walking today. Pt found to have MSSA bacteremia c/b septic left knee      MSSA Bacteremia, leucocytosis, lt knee septic arthritis: s/p wash out   -c/w Cefazolin at 2g q6   -repeat Bcx 6/8, 6/14, 6/15 NTD  -TTE: Valves not well visualized, cannot rule out vegetation. DENIS 6/16 without evidence of IE.  -Pt had some tenderness at T-L spine junction, now resolved. CT T and L spine without evidence of abscess, OM/discitis  -Ortho on board   -trend CBC for leucocytosis, continues to be elevated but pt clinically improved. No CBC today  - can place PICC   -Will need 6 weeks of therapy. CBC, CMP once a week while on OPAT, Labs to be followed by rehab physician.   pt to follow up with me in 4 weeks in office.       Plan discussed with Medicine NP      Christiano Colorado  Pager: [phone]. If no response or past 5 pm call [phone].
72yo female b/l lymphedema, morbid obesity, HTN, hypothyrodism, ambulates with walker p/w 2 days L knee pain preventing her from walking today. Pt found to have     sepsis, leucocytosis, MSSA bacteremia with c/f septic left knee.      MSSA Bacteremia  -f/u repeat Bcx, NTD   -f/u TTE  -mild back discomfort after transportation to hospital but no significant localizing tenderness to suggest spinal abscess/OM as met foci  -left knee incredibly painful, severe limitation in ROM. CT imaging without significant effusion, study possibly limited by body habitus. Unable to obtain MRI given body habitus  - IR performed drainage, limited fluid, unable to get enough for cell count, only culture. Gram stain without PMN's, unclear if fluid is from knee given difficulty of procedure given body habitus  -high clinical concern for joint infection given pain, limited ROM in s/o MSSA bacteremia  -f/u with Ortho regarding OR, washout of left knee  -no hardware or artificial valves      Asymptomatic bacteriuria  -PA in Ucx, UA without pyuria and pt without symptoms, would not treat at this time      Plan discussed with Medicine NP     Christiano Colorado  Pager: [phone]. If no response or past 5 pm call [phone].
72yo female b/l lymphedema, morbid obesity, HTN, hypothyrodism, ambulates with walker p/w 2 days L knee pain preventing her from walking today. Pt found to have MSSA bacteremia c/b septic left knee      MSSA Bacteremia, leucocytosis, lt knee septic arthritis: s/p wash out   -c/w Cefazolin at 2g q6 (dose increased based on body wt)  -repeat Bcx 6/8, 6/14, 6/15 NTD  -TTE: Valves not well visualized, cannot rule out vegetation. DENIS 6/16 without evidence of IE.  -Pt had some tenderness at T-L spine junction, now resolved. CT T and L spine without evidence of abscess, OM/discitis  -Ortho on board   -trend CBC for leucocytosis, decreasing, clinically improved  - PICC placed today    Stable from ID perspective for d/c to rehab, will need 6 week so antibiotics from neg Bcx (6/8) ending 7/15. Check CBC, CMP weekly.  pt to follow in 4-6 weeks as outpt.     Please call ID service for questions over weekend at [phone].     Christiano Colorado  Pager: [phone]. If no response or past 5 pm call [phone].
Patient seen and examined.  Agree with above.   Check DENIS  Further workup pending above    Joseline Rodrigues MD
Patient seen and examined.  Agree with above.   Management of rash per derm  No further inpatient cardiac workup needed at this time.     Joseline Rodrigues MD
recovering well, no new complaints.
70yo female b/l lymphedema, morbid obesity, HTN, hypothyrodism, ambulates with walker p/w 2 days L knee pain preventing her from walking today. Pt found to have MSSA bacteremia c/b septic left knee      MSSA Bacteremia, leucocytosis, lt knee septic arthritis: s/p wash out   -c/w Cefazolin at 2g q6   -Bcx 6/8 NTD x 2  -TTE: Valves not well visualized, cannot rule out vegetation. DENIS planned for today   -Pt has point tenderness on physical exam of thoarcolumbar junction c/f possible metastatic foci of MSSA infection. Would recommend CT imaging of T and L spine with contrast.  -Ortho on board, discussed with Ortho to reevaluate given rising leucocytosis   -trend CBC for leucocytosis, continues to be elevated, reordered blood cx.   - will eventually need PICC         Discussed recommendations with medicine NP    Christiano Colorado  Pager: [phone]. If no response or past 5 pm call [phone].
seen and agree w/ PA.  recovering well from knee surgery.  feels less ill today.
70yo female b/l lymphedema, morbid obesity, HTN, hypothyrodism, ambulates with walker p/w 2 days L knee pain preventing her from walking today. Pt found to have MSSA bacteremia c/b septic left knee    MSSA Bacteremia, leucocytosis :   -c/w Cefazolin. Total dose increased, now 2g q6 given significant BMI  -repeat Bcx 6/8 NTD x 2  -TTE: Valves not well visualized, cannot rule out vegetation. Will need DENIS eventually.   - IR performed drainage, limited fluid, unable to get enough for cell count, how culture growing Staph aureus, confirming clinical suspicion of Septic Native Joint. Pending OR for washout by Ortho today  -Continued to endorse back pain, point tenderness to palpation midline thoraco-lumbar junction. Will likely need further imaging. MRI ideal but cannot obtain 2/2 pt's body habitus. Consideration of CT with contrast vs NM study to assess for OM/Discitis/Paravertebral abscess in s/o MSSA bacteremia  -no hardware or artificial valves    Asymptomatic bacteriuria  -PA in Ucx, UA without pyuria and pt without symptoms, would not treat at this time      Christiano Colorado  Pager: [phone]. If no response or past 5 pm call [phone].
72yo female b/l lymphedema, morbid obesity, HTN, hypothyrodism, ambulates with walker p/w 2 days L knee pain preventing her from walking today. Pt found to have MSSA bacteremia c/b septic left knee      MSSA Bacteremia, leucocytosis, lt knee septic arthritis: s/p wash out   -c/w Cefazolin at 2g q6   -repeat Bcx 6/8, 6/14, 6/15 NTD  -TTE: Valves not well visualized, cannot rule out vegetation. DENIS planned.  -Pt had some tenderness at T-L spine junction, now resolved. CT T and L spine without evidence of abscess, OM/discitis  -Ortho on board   -trend CBC for leucocytosis, starting to trend down, pt clinically improved.   - will eventually need PICC     Christiano Colorado  Pager: [phone]. If no response or past 5 pm call [phone].

## 2021-06-21 NOTE — CONSULT NOTE ADULT - REASON FOR ADMISSION
Left knee pain today

## 2021-06-21 NOTE — PROGRESS NOTE ADULT - SUBJECTIVE AND OBJECTIVE BOX
Patient is a 71y old  Female who presents with a chief complaint of Left knee pain today (08 Jun 2021 17:01)      SUBJECTIVE / OVERNIGHT EVENTS: no events over night     Note not saved in the system     T(C): 37.2 (06-22-21 @ 11:07), Max: 37.2 (06-22-21 @ 11:07)  HR: 96 (06-22-21 @ 11:07) (96 - 96)  BP: 167/74 (06-22-21 @ 11:07) (167/74 - 167/74)  RR: 18 (06-22-21 @ 11:07) (18 - 18)  SpO2: 96% (06-22-21 @ 11:07) (96% - 96%)        MEDICATIONS  (STANDING):  atorvastatin 10 milliGRAM(s) Oral at bedtime  ceFAZolin   IVPB 2000 milliGRAM(s) IV Intermittent every 6 hours  chlorhexidine 4% Liquid 1 Application(s) Topical daily  clotrimazole 1% Cream 1 Application(s) Topical two times a day  DULoxetine 60 milliGRAM(s) Oral daily  heparin   Injectable 5000 Unit(s) SubCutaneous every 8 hours  levothyroxine 25 MICROGram(s) Oral daily  pantoprazole    Tablet 40 milliGRAM(s) Oral before breakfast  senna 2 Tablet(s) Oral at bedtime  triamcinolone 0.1% Ointment 1 Application(s) Topical two times a day    MEDICATIONS  (PRN):  acetaminophen   Tablet .. 650 milliGRAM(s) Oral every 6 hours PRN Temp greater or equal to 38C (100.4F)  oxyCODONE    IR 10 milliGRAM(s) Oral every 4 hours PRN Moderate Pain (4 - 6)      PHYSICAL EXAM:  GENERAL: NAD, well-developed  HEAD:  Atraumatic, Normocephalic  EYES: EOMI, conjunctiva and sclera clear  NECK: Supple, No JVD  CHEST/LUNG: Clear to auscultation bilaterally; No wheeze  HEART: Regular rate and rhythm; No murmurs, rubs, or gallops  ABDOMEN: Soft, Nontender, Nondistended; Bowel sounds present  EXTREMITIES:  Left knee swollen, erythematous   2+ Peripheral Pulses, No clubbing, cyanosis, or edema  PSYCH: AAOx3  NEUROLOGY: non-focal  SKIN: No rashes or lesions                                                           11.3   17.37 )-----------( 431      ( 22 Jun 2021 07:44 )             35.6           LIVER FUNCTIONS - ( 22 Jun 2021 07:42 )  Alb: 3.2 g/dL / Pro: 7.2 g/dL / ALK PHOS: 78 U/L / ALT: <5 U/L / AST: 14 U/L / GGT: x             134|93|10<105  4.0|23|0.56  9.6,--,--  06-22 @ 07:42      CAPILLARY BLOOD GLUCOSE      RADIOLOGY & ADDITIONAL TESTS:    Imaging Personally Reviewed:    Consultant(s) Notes Reviewed:      Care Discussed with Consultants/Other Providers:

## 2021-06-21 NOTE — CONSULT NOTE ADULT - ATTENDING COMMENTS
Morbilliform rash possibly due to drug hypersensitivity>>viral exanthem, with predominantly periaxillary skin involvement which could represent early evolution vs mild involvement of skin. Would not change current medications as there is limited skin involvement and no obvious evidence of systemic involvement (although would obtain CBC w/ diff along with CMP). Can treat topically with triamcinolone ointment and antihistamines.

## 2021-06-21 NOTE — CONSULT NOTE ADULT - ASSESSMENT
72yo female b/l lymphedema, morbid obesity, HTN, hypothyrodism, ambulates with walker p/w 2 days L knee pain preventing her from walking today.    Pt found to have sepsis, fever, leucocytosis, tachycardia, bacteremia, staph aureus infection, high concern for lt knee septic arthritis. Morbid obesity.   rising leucocytosis, persistent fever.      Plan;   c/w cefazolin 2 gm iv q8h   discussed with ortho unable to tap joint due to body habitus, plan for OR based on synovial fluid results.   discussed with IR, CT with not much effusion, pt cannot fit in MRI, will attempt U/S needle aspiration of joint.   trend CBC for leucocytosis   repeat blood cx in lab  TTE   urine cx with pseudomonas, ? significance in absence of pyuria, will not plan to treat.       Plan discussed with Medicine Attending, IR, Ortho   Spent 80 mins in coordinating care.    Christiano Colorado  Pager: [phone]. If no response or past 5 pm call [phone].   
# Dermatitis  - Ddx: Morphology of the rash resembles morbilliform drug eruption, though localized distribution would be atypical unless early. Other consider considerations include a nonspecific eczematous dermatitis.  - If rash represents a drug eruption, vancomycin (6/7-6/8), meropenem (6/7-6/8), cefazolin (6/8-present) are potential culprits. We would NOT recommend discontinuing cefazolin at this time as rash is currently mild and unclear if this is truly a drug eruption.  - start triamcinolone 0.1% ointment BID x 2 weeks.  - start PO antihistamine (such as benadryl, zyrtec, or hydroxyzine) for pruritus.  - No evidence of a severe cutaneous adverse drug reaction such as DRESS or SJS at this time.  Cr wnl. No recently Eos or LFTs available for review.  - Trend CBC w/ diff and CMP (w/ LFTs) daily to r/o evolving DRESS (very low suspicion).    The patient's chart was reviewed in addition to being seen and examined at bedside with the dermatology attending Dr. Renata Longo.
Patient seen and examined, agree with above assessment and plan as transcribed above.    - f/u echo  - would not delay knee wash out for echo  - no pulmonary edema on exam or radiaographic findings  - Will need anesthesia eval given norbid obesity    Manny Caceres MD, [facility]  BEEPER [phone]

## 2021-06-22 LAB
ALBUMIN SERPL ELPH-MCNC: 3.2 G/DL — LOW (ref 3.3–5)
ALP SERPL-CCNC: 78 U/L — SIGNIFICANT CHANGE UP (ref 40–120)
ALT FLD-CCNC: <5 U/L — LOW (ref 10–45)
ANION GAP SERPL CALC-SCNC: 18 MMOL/L — HIGH (ref 5–17)
AST SERPL-CCNC: 14 U/L — SIGNIFICANT CHANGE UP (ref 10–40)
BASOPHILS # BLD AUTO: 0 K/UL — SIGNIFICANT CHANGE UP (ref 0–0.2)
BASOPHILS NFR BLD AUTO: 0 % — SIGNIFICANT CHANGE UP (ref 0–2)
BILIRUB SERPL-MCNC: 0.5 MG/DL — SIGNIFICANT CHANGE UP (ref 0.2–1.2)
BUN SERPL-MCNC: 10 MG/DL — SIGNIFICANT CHANGE UP (ref 7–23)
CALCIUM SERPL-MCNC: 9.6 MG/DL — SIGNIFICANT CHANGE UP (ref 8.4–10.5)
CHLORIDE SERPL-SCNC: 93 MMOL/L — LOW (ref 96–108)
CO2 SERPL-SCNC: 23 MMOL/L — SIGNIFICANT CHANGE UP (ref 22–31)
CREAT SERPL-MCNC: 0.56 MG/DL — SIGNIFICANT CHANGE UP (ref 0.5–1.3)
CULTURE RESULTS: SIGNIFICANT CHANGE UP
DACRYOCYTES BLD QL SMEAR: SLIGHT — SIGNIFICANT CHANGE UP
EOSINOPHIL # BLD AUTO: 0 K/UL — SIGNIFICANT CHANGE UP (ref 0–0.5)
EOSINOPHIL NFR BLD AUTO: 0 % — SIGNIFICANT CHANGE UP (ref 0–6)
GLUCOSE SERPL-MCNC: 105 MG/DL — HIGH (ref 70–99)
HCT VFR BLD CALC: 35.6 % — SIGNIFICANT CHANGE UP (ref 34.5–45)
HGB BLD-MCNC: 11.3 G/DL — LOW (ref 11.5–15.5)
LYMPHOCYTES # BLD AUTO: 0.61 K/UL — LOW (ref 1–3.3)
LYMPHOCYTES # BLD AUTO: 3.5 % — LOW (ref 13–44)
MANUAL SMEAR VERIFICATION: SIGNIFICANT CHANGE UP
MCHC RBC-ENTMCNC: 29.3 PG — SIGNIFICANT CHANGE UP (ref 27–34)
MCHC RBC-ENTMCNC: 31.7 GM/DL — LOW (ref 32–36)
MCV RBC AUTO: 92.2 FL — SIGNIFICANT CHANGE UP (ref 80–100)
MONOCYTES # BLD AUTO: 1.68 K/UL — HIGH (ref 0–0.9)
MONOCYTES NFR BLD AUTO: 9.7 % — SIGNIFICANT CHANGE UP (ref 2–14)
NEUTROPHILS # BLD AUTO: 15.08 K/UL — HIGH (ref 1.8–7.4)
NEUTROPHILS NFR BLD AUTO: 86.8 % — HIGH (ref 43–77)
ORGANISM # SPEC MICROSCOPIC CNT: SIGNIFICANT CHANGE UP
ORGANISM # SPEC MICROSCOPIC CNT: SIGNIFICANT CHANGE UP
PLAT MORPH BLD: NORMAL — SIGNIFICANT CHANGE UP
PLATELET # BLD AUTO: 431 K/UL — HIGH (ref 150–400)
POIKILOCYTOSIS BLD QL AUTO: SLIGHT — SIGNIFICANT CHANGE UP
POLYCHROMASIA BLD QL SMEAR: SLIGHT — SIGNIFICANT CHANGE UP
POTASSIUM SERPL-MCNC: 4 MMOL/L — SIGNIFICANT CHANGE UP (ref 3.5–5.3)
POTASSIUM SERPL-SCNC: 4 MMOL/L — SIGNIFICANT CHANGE UP (ref 3.5–5.3)
PROT SERPL-MCNC: 7.2 G/DL — SIGNIFICANT CHANGE UP (ref 6–8.3)
RBC # BLD: 3.86 M/UL — SIGNIFICANT CHANGE UP (ref 3.8–5.2)
RBC # FLD: 13.8 % — SIGNIFICANT CHANGE UP (ref 10.3–14.5)
RBC BLD AUTO: ABNORMAL
SARS-COV-2 RNA SPEC QL NAA+PROBE: SIGNIFICANT CHANGE UP
SODIUM SERPL-SCNC: 134 MMOL/L — LOW (ref 135–145)
SPECIMEN SOURCE: SIGNIFICANT CHANGE UP
TARGETS BLD QL SMEAR: SLIGHT — SIGNIFICANT CHANGE UP
WBC # BLD: 17.37 K/UL — HIGH (ref 3.8–10.5)
WBC # FLD AUTO: 17.37 K/UL — HIGH (ref 3.8–10.5)

## 2021-06-22 PROCEDURE — 99232 SBSQ HOSP IP/OBS MODERATE 35: CPT

## 2021-06-22 RX ADMIN — Medication 1 APPLICATION(S): at 06:05

## 2021-06-22 RX ADMIN — OXYCODONE HYDROCHLORIDE 10 MILLIGRAM(S): 5 TABLET ORAL at 12:47

## 2021-06-22 RX ADMIN — Medication 25 MICROGRAM(S): at 06:05

## 2021-06-22 RX ADMIN — Medication 100 MILLIGRAM(S): at 11:10

## 2021-06-22 RX ADMIN — OXYCODONE HYDROCHLORIDE 10 MILLIGRAM(S): 5 TABLET ORAL at 06:36

## 2021-06-22 RX ADMIN — Medication 100 MILLIGRAM(S): at 16:53

## 2021-06-22 RX ADMIN — Medication 100 MILLIGRAM(S): at 22:46

## 2021-06-22 RX ADMIN — DULOXETINE HYDROCHLORIDE 60 MILLIGRAM(S): 30 CAPSULE, DELAYED RELEASE ORAL at 11:10

## 2021-06-22 RX ADMIN — HEPARIN SODIUM 5000 UNIT(S): 5000 INJECTION INTRAVENOUS; SUBCUTANEOUS at 04:31

## 2021-06-22 RX ADMIN — Medication 100 MILLIGRAM(S): at 06:04

## 2021-06-22 RX ADMIN — OXYCODONE HYDROCHLORIDE 10 MILLIGRAM(S): 5 TABLET ORAL at 06:05

## 2021-06-22 RX ADMIN — Medication 1 APPLICATION(S): at 17:01

## 2021-06-22 RX ADMIN — CHLORHEXIDINE GLUCONATE 1 APPLICATION(S): 213 SOLUTION TOPICAL at 11:07

## 2021-06-22 RX ADMIN — OXYCODONE HYDROCHLORIDE 10 MILLIGRAM(S): 5 TABLET ORAL at 16:53

## 2021-06-22 RX ADMIN — OXYCODONE HYDROCHLORIDE 10 MILLIGRAM(S): 5 TABLET ORAL at 17:21

## 2021-06-22 RX ADMIN — PANTOPRAZOLE SODIUM 40 MILLIGRAM(S): 20 TABLET, DELAYED RELEASE ORAL at 06:05

## 2021-06-22 RX ADMIN — HEPARIN SODIUM 5000 UNIT(S): 5000 INJECTION INTRAVENOUS; SUBCUTANEOUS at 11:10

## 2021-06-22 RX ADMIN — ATORVASTATIN CALCIUM 10 MILLIGRAM(S): 80 TABLET, FILM COATED ORAL at 22:46

## 2021-06-22 RX ADMIN — HEPARIN SODIUM 5000 UNIT(S): 5000 INJECTION INTRAVENOUS; SUBCUTANEOUS at 22:46

## 2021-06-22 RX ADMIN — OXYCODONE HYDROCHLORIDE 10 MILLIGRAM(S): 5 TABLET ORAL at 22:46

## 2021-06-22 RX ADMIN — OXYCODONE HYDROCHLORIDE 10 MILLIGRAM(S): 5 TABLET ORAL at 13:30

## 2021-06-22 RX ADMIN — Medication 1 APPLICATION(S): at 11:07

## 2021-06-22 NOTE — PROGRESS NOTE ADULT - ASSESSMENT
72yo female b/l lymphedema, morbid obesity, HTN, hypothyrodism, ambulates with walker p/w 2 days L knee pain preventing her from walking today. Pt found to have MSSA bacteremia c/b septic left knee      MSSA Bacteremia, persistent and rising leucocytosis, lt knee septic arthritis, new rash: s/p wash out     Plan:   -c/w Cefazolin at 2g q6 (dose increased based on body wt)  -repeat Bcx 6/8, 6/14, 6/15 NTD  -TTE: Valves not well visualized, cannot rule out vegetation. DENIS 6/16 without evidence of IE.  -Ortho on board   -trend CBC for leucocytosis, persists  -s/p PICC   -new rash appears more intertriginous and localized rather than diffuse, s/p derm eval.   -recommend CT chest/abd/pelvis with contrast to look for occult source of infection given persistent leucocytosis, pt continues to refuse  -another set of blood cx NTD      will need 6 week so antibiotics from neg Bcx (6/8) ending 7/15. Check CBC, CMP weekly.  pt to follow in 4-6 weeks as outpt.       Plan discussed with Medicine NP      Christiano [place]  Pager: [phone]. If no response or past 5 pm call [phone].

## 2021-06-22 NOTE — CHART NOTE - NSCHARTNOTESELECT_GEN_ALL_CORE
Event Note
IR
Nutrition Services
fever, +BC/Event Note
Event Note
Nutrition Services
Ortho POC/Event Note
Ortho/Event Note

## 2021-06-22 NOTE — PROGRESS NOTE ADULT - SUBJECTIVE AND OBJECTIVE BOX
71y old  Female who presents with a chief complaint of Left knee pain today (22 Jun 2021 15:18)      Interval history:  Afebrile, besides the knee feels well, rash unchanged.       Allergies:   No Known Allergies      Antimicrobials:  ceFAZolin   IVPB 2000 milliGRAM(s) IV Intermittent every 6 hours      REVIEW OF SYSTEMS:  No chest pain   No SOB  No abdominal pain  No dysuria        Vital Signs Last 24 Hrs  T(C): 37.2 (06-22-21 @ 11:07), Max: 37.2 (06-21-21 @ 20:42)  T(F): 98.9 (06-22-21 @ 11:07), Max: 99 (06-21-21 @ 20:42)  HR: 96 (06-22-21 @ 11:07) (85 - 100)  BP: 167/74 (06-22-21 @ 11:07) (136/92 - 167/74)  BP(mean): --  RR: 18 (06-22-21 @ 11:07) (18 - 18)  SpO2: 96% (06-22-21 @ 11:07) (95% - 96%)      PHYSICAL EXAM:  Gen: morbid obesity, non-toxic  Vascular: RUE PICC  Lungs: + air entry b/l   Heart: S1 S2 normal.   Abdomen: soft, non-ttp  Ext: b/l lymphedema  Skin: chronic appearing changes to LLE  maculopapular itchy rash in axilla, lt>rt, some on the lt waist                             11.3   17.37 )-----------( 431      ( 22 Jun 2021 07:44 )             35.6   06-22    134<L>  |  93<L>  |  10  ----------------------------<  105<H>  4.0   |  23  |  0.56    Ca    9.6      22 Jun 2021 07:42    TPro  7.2  /  Alb  3.2<L>  /  TBili  0.5  /  DBili  x   /  AST  14  /  ALT  <5<L>  /  AlkPhos  78  06-22      LIVER FUNCTIONS - ( 22 Jun 2021 07:42 )  Alb: 3.2 g/dL / Pro: 7.2 g/dL / ALK PHOS: 78 U/L / ALT: <5 U/L / AST: 14 U/L / GGT: x               Culture - Blood (collected 21 Jun 2021 14:36)  Source: .Blood Blood-Peripheral  Preliminary Report (22 Jun 2021 15:02):    No growth to date.    Culture - Blood (collected 21 Jun 2021 14:36)  Source: .Blood Blood-Peripheral  Preliminary Report (22 Jun 2021 15:02):    No growth to date.

## 2021-06-22 NOTE — PROGRESS NOTE ADULT - ASSESSMENT
70yo female b/l lymphedema, morbid obesity, HTN, hypothyrodism, ambulates with walker p/w 2 days L knee pain         Leukocytosis   Fungal Rash     Anti fungals. Follow up blood cultures.        Septic Knee   Left knee pain- Reviewed imaging. Ortho consult appreciated.     s/p IR aspiration of Left Knee.    Patient s/p aspiration of Left Knee hemo vac drain removed.    S/P or Drainage.     Bacteremia   Continue with Ancef as per ID. PICC Discharge planning ALE   ID following.   TTE shows Endocardial visualization enhanced with intravenous  injection of Ultrasonic Enhancing Agent (Definity), preserved left ventricular ejection fraction.  DENIS no vegetations     Hypothyroidism Continue with Synthroid     HTN Continue with Lisinopril.   Hold Norvasc. .     DVT ppx.

## 2021-06-22 NOTE — PROGRESS NOTE ADULT - SUBJECTIVE AND OBJECTIVE BOX
S: Denies chest pain or SOB. Review of systems otherwise (-)    Constitutional: [ ] fevers, [ ] chills.   Skin: [ ] dry skin. [ ] rashes.  Psychiatric: [ ] depression, [ ] anxiety.   Gastrointestinal: [ ] BRBPR, [ ] melena.   Neurological: [ ] confusion. [ ] seizures. [ ] shuffling gait.   Ears,Nose,Mouth and Throat: [ ] ear pain [ ] sore throat.   Eyes: [ ] diplopia.   Respiratory: [ ] hemoptysis. [ ] shortness of breath  Cardiovascular: See HPI above  Hematologic/Lymphatic: [ ] anemia. [ ] painful nodes. [ ] prolonged bleeding.   Genitourinary: [ ] hematuria. [ ] flank pain.   Endocrine: [ ] significant change in weight. [ ] intolerance to heat and cold.     Review of systems [ x] otherwise negative, [ ] otherwise unable to obtain    FH: no family history of sudden cardiac death in first degree relatives    SH: [ ] tobacco, [ ] alcohol, [ ] drugs      MEDICATIONS  (STANDING):  atorvastatin 10 milliGRAM(s) Oral at bedtime  ceFAZolin   IVPB 2000 milliGRAM(s) IV Intermittent every 6 hours  chlorhexidine 4% Liquid 1 Application(s) Topical daily  clotrimazole 1% Cream 1 Application(s) Topical two times a day  DULoxetine 60 milliGRAM(s) Oral daily  heparin   Injectable 5000 Unit(s) SubCutaneous every 8 hours  levothyroxine 25 MICROGram(s) Oral daily  pantoprazole    Tablet 40 milliGRAM(s) Oral before breakfast  senna 2 Tablet(s) Oral at bedtime  triamcinolone 0.1% Ointment 1 Application(s) Topical two times a day    MEDICATIONS  (PRN):  acetaminophen   Tablet .. 650 milliGRAM(s) Oral every 6 hours PRN Temp greater or equal to 38C (100.4F)  oxyCODONE    IR 10 milliGRAM(s) Oral every 4 hours PRN Moderate Pain (4 - 6)      LABS:                          11.3   17.37 )-----------( 431      ( 22 Jun 2021 07:44 )             35.6     Hemoglobin: 11.3 g/dL (06-22 @ 07:44)  Hemoglobin: 10.6 g/dL (06-21 @ 06:44)  Hemoglobin: 11.0 g/dL (06-19 @ 07:13)  Hemoglobin: 12.0 g/dL (06-18 @ 08:58)    06-22    134<L>  |  93<L>  |  10  ----------------------------<  105<H>  4.0   |  23  |  0.56    Ca    9.6      22 Jun 2021 07:42    TPro  7.2  /  Alb  3.2<L>  /  TBili  0.5  /  DBili  x   /  AST  14  /  ALT  <5<L>  /  AlkPhos  78  06-22    Creatinine Trend: 0.56<--, 0.54<--, 0.56<--, 0.58<--, 0.57<--, 0.51<--             06-21-21 @ 07:01  -  06-22-21 @ 07:00  --------------------------------------------------------  IN: 560 mL / OUT: 503 mL / NET: 57 mL    06-22-21 @ 07:01  -  06-22-21 @ 15:19  --------------------------------------------------------  IN: 480 mL / OUT: 600 mL / NET: -120 mL        PHYSICAL EXAM  Vital Signs Last 24 Hrs  T(C): 37.2 (22 Jun 2021 11:07), Max: 37.2 (21 Jun 2021 20:42)  T(F): 98.9 (22 Jun 2021 11:07), Max: 99 (21 Jun 2021 20:42)  HR: 96 (22 Jun 2021 11:07) (85 - 100)  BP: 167/74 (22 Jun 2021 11:07) (136/92 - 167/74)  BP(mean): --  RR: 18 (22 Jun 2021 11:07) (18 - 18)  SpO2: 96% (22 Jun 2021 11:07) (95% - 96%)        Gen: Appears well in NAD  HEENT:  (-)icterus (-)pallor  CV: N S1 S2 1/6 DIETER (+)2 Pulses B/l  Resp:  Clear to auscultation B/L, normal effort  GI: (+) BS Soft, NT, ND  Lymph:  (+) B/L chronic LE Edema, (-)obvious lymphadenopathy  Skin: Warm to touch, Normal turgor  Psych: Appropriate mood and affect    TELEMETRY: None	      ECG: NSR, low voltage QRS     < from: TTE with Doppler (w/Cont) (06.08.21 @ 16:27) >  Conclusions:  Technically difficult study-valves not well visualized.  1. Endocardial visualization enhanced with intravenous  injection of Ultrasonic Enhancing Agent (Definity). Overall  preserved left ventricular ejection fraction. Wall motion  evaluation is limited.  2. No Valves visualized. Unable to exclude valvular  vegetations in the setting of a technically difficult  study.  3. DENIS could be obtained for further evaluation of valvular  vegetations, if clinical suspicion of endocarditis is high.    *** Noprevious Echo exam.    < end of copied text >      < from: DENIS w/o TTE (w/3D Echo) (06.16.21 @ 15:50) >  Conclusions:  1. Normal mitral valve. Mild mitral regurgitation.  2. Normal trileaflet aortic valve. Minimal aortic  regurgitation.  3. Normal left atrium.  No left atrium or left atrial  appendage thrombus. A membrane was seen in the left atrium  attached to the atrial septum consistent with left atrial  septal pouch.  4. Normal left ventricular systolic function. No segmental  wall motion abnormalities.  5. Normal right ventricular size and function.  6. No evidence of valvular vegetation is seen.    < end of copied text >  	    RADIOLOGY:         CXR: Clear lungs    ASSESSMENT/PLAN: Patient is a 70 y/o female with PMH of b/l lymphedema, morbid obesity, HTN, and hypothyroidism who presented with L knee pain and inability to walk concerning for septic joint and found to have staph aureus bacteremia. Cardiology consulted for preop clearance.  S/p I&D of L knee on 6/10    - No evidence of clinical HF or anginal symptoms  - Ortho follow up - s/p L knee I&D  - Abx per ID - s/p PICC, repeat prelim BCx negative, trend WBC  - TTE noted difficult study, preserved LVEF   - CT spine noted  - DENIS negative for endocarditis   - Derm eval appreciated  - No further inpatient cardiac w/u planned    Paras Perez PA-C  Pager: [phone]

## 2021-06-22 NOTE — PROGRESS NOTE ADULT - SUBJECTIVE AND OBJECTIVE BOX
Patient is a 71y old  Female who presents with a chief complaint of Left knee pain today (08 Jun 2021 17:01)      SUBJECTIVE / OVERNIGHT EVENTS: no events over night     T(C): 37.2 (06-22-21 @ 11:07), Max: 37.2 (06-22-21 @ 11:07)  HR: 96 (06-22-21 @ 11:07) (96 - 96)  BP: 167/74 (06-22-21 @ 11:07) (167/74 - 167/74)  RR: 18 (06-22-21 @ 11:07) (18 - 18)  SpO2: 96% (06-22-21 @ 11:07) (96% - 96%)        MEDICATIONS  (STANDING):  atorvastatin 10 milliGRAM(s) Oral at bedtime  ceFAZolin   IVPB 2000 milliGRAM(s) IV Intermittent every 6 hours  chlorhexidine 4% Liquid 1 Application(s) Topical daily  clotrimazole 1% Cream 1 Application(s) Topical two times a day  DULoxetine 60 milliGRAM(s) Oral daily  heparin   Injectable 5000 Unit(s) SubCutaneous every 8 hours  levothyroxine 25 MICROGram(s) Oral daily  pantoprazole    Tablet 40 milliGRAM(s) Oral before breakfast  senna 2 Tablet(s) Oral at bedtime  triamcinolone 0.1% Ointment 1 Application(s) Topical two times a day    MEDICATIONS  (PRN):  acetaminophen   Tablet .. 650 milliGRAM(s) Oral every 6 hours PRN Temp greater or equal to 38C (100.4F)  oxyCODONE    IR 10 milliGRAM(s) Oral every 4 hours PRN Moderate Pain (4 - 6)      PHYSICAL EXAM:  GENERAL: NAD, well-developed  HEAD:  Atraumatic, Normocephalic  EYES: EOMI, conjunctiva and sclera clear  NECK: Supple, No JVD  CHEST/LUNG: Clear to auscultation bilaterally; No wheeze  HEART: Regular rate and rhythm; No murmurs, rubs, or gallops  ABDOMEN: Soft, Nontender, Nondistended; Bowel sounds present  EXTREMITIES:  Left knee swollen, erythematous   2+ Peripheral Pulses, No clubbing, cyanosis, or edema  PSYCH: AAOx3  NEUROLOGY: non-focal  SKIN: No rashes or lesions                                                           11.3   17.37 )-----------( 431      ( 22 Jun 2021 07:44 )             35.6           LIVER FUNCTIONS - ( 22 Jun 2021 07:42 )  Alb: 3.2 g/dL / Pro: 7.2 g/dL / ALK PHOS: 78 U/L / ALT: <5 U/L / AST: 14 U/L / GGT: x             134|93|10<105  4.0|23|0.56  9.6,--,--  06-22 @ 07:42      CAPILLARY BLOOD GLUCOSE      RADIOLOGY & ADDITIONAL TESTS:    Imaging Personally Reviewed:    Consultant(s) Notes Reviewed:      Care Discussed with Consultants/Other Providers:

## 2021-06-22 NOTE — CHART NOTE - NSCHARTNOTEFT_GEN_A_CORE
Nutrition Follow Up Note  Patient seen for follow up.     Pertinent information as per chart: Pt 72 y/o female with PMH of emi. lymphedema, morbid obesity, HTN, hypothyroidism, ambulates with walker, admitted with 2 days L knee pain preventing her from walking, found to have MSSA bacteremia c/b septic left knee, S/P IR aspiration of Left Knee.     Source: [x] Patient       [x] EMR        [] RN        [] Family at bedside       [] Other:    -If unable to interview patient: [] Trach/Vent/BiPAP  [] Disoriented/confused/inappropriate to interview as per chart     Pt reports decreased appetite and PO intake "since her surgery". Noted mostly 100% PO intake as per flow sheets and 75% as per breakfast tray at bedside. Offered nutritional supplement - pt refused at this time, states will consume more protein and food today. Denies difficulty chewing/swallowing. Pt denies nausea or vomiting. Reports last hard BM yesterday (06/21), previously with constipation - states will order prune juice today. Reviewed the importance of adequate kcal and protein intake to help with healing. Pt denies having further questions/concerns about diet and nutrition - made aware RD remains available.     Diet Order:   Diet, Regular (06-11-21)    Weights: (06/07) 449 pounds -?accuracy due to fluid accumulation.   Daily     Nutritionally Pertinent MEDICATIONS  (STANDING):  atorvastatin  ceFAZolin   IVPB  levothyroxine  pantoprazole    Tablet  senna    Pertinent Labs: (06/22) Na 134<L>, BUN 10, Cr 0.56, <H>, K+ 4.0    Skin per nursing documentation: pressure ulcers in emi. posterior thigh, left lower posterior leg, and coccyx suspected deep tissue injury and emi. heel stage 1.   Edema as per flow sheets: +4 emi. foot and leg.    Estimated Needs:   [x] no change since previous assessment (meets estimated needs for pressure ulcer healing).   [] recalculated:     Previous Nutrition Diagnoses:  [x] Overweight/obesity   [x] Increased Nutrient Needs      Nutrition Diagnoses are: [x] ongoing - being addressed with PO intake encouragement and regular diet; weight loss education when able/applicable.     New Nutrition Diagnosis: [x] Not applicable    Nutrition Care Plan:  [x] In Progress     Nutrition Interventions:     Education Provided:       [x] Yes  [] No    Recommendations:      1. Continue regular diet. Will continue to monitor as able and adjust as needed.   2. Encourage PO intake and obtain food preferences (obtained at this time).   3. Recommend Multivitamin and Vitamin C if medically feasible to optimize nutrient intake and aid with pressure ulcer healing.  4. Reviewed the importance of adequate kcal and protein intake to help with healing.  5. Obtain current weight as able to identify changes if any.     Monitoring and Evaluation:   Continue to monitor nutritional intake, tolerance to diet prescription, weights, labs, skin integrity    RD remains available upon request and will follow up per protocol  Kaur Morgan MS RDN CDN #[phone]

## 2021-06-23 VITALS
OXYGEN SATURATION: 93 % | TEMPERATURE: 98 F | RESPIRATION RATE: 19 BRPM | SYSTOLIC BLOOD PRESSURE: 148 MMHG | HEART RATE: 98 BPM | DIASTOLIC BLOOD PRESSURE: 87 MMHG

## 2021-06-23 DIAGNOSIS — I10 ESSENTIAL (PRIMARY) HYPERTENSION: ICD-10-CM

## 2021-06-23 DIAGNOSIS — E03.9 HYPOTHYROIDISM, UNSPECIFIED: ICD-10-CM

## 2021-06-23 LAB
ALBUMIN SERPL ELPH-MCNC: 3.1 G/DL — LOW (ref 3.3–5)
ALP SERPL-CCNC: 81 U/L — SIGNIFICANT CHANGE UP (ref 40–120)
ALT FLD-CCNC: 5 U/L — LOW (ref 10–45)
ANION GAP SERPL CALC-SCNC: 17 MMOL/L — SIGNIFICANT CHANGE UP (ref 5–17)
AST SERPL-CCNC: 27 U/L — SIGNIFICANT CHANGE UP (ref 10–40)
BASOPHILS # BLD AUTO: 0.12 K/UL — SIGNIFICANT CHANGE UP (ref 0–0.2)
BASOPHILS NFR BLD AUTO: 0.7 % — SIGNIFICANT CHANGE UP (ref 0–2)
BILIRUB SERPL-MCNC: 0.4 MG/DL — SIGNIFICANT CHANGE UP (ref 0.2–1.2)
BUN SERPL-MCNC: 12 MG/DL — SIGNIFICANT CHANGE UP (ref 7–23)
CALCIUM SERPL-MCNC: 9.2 MG/DL — SIGNIFICANT CHANGE UP (ref 8.4–10.5)
CHLORIDE SERPL-SCNC: 92 MMOL/L — LOW (ref 96–108)
CO2 SERPL-SCNC: 23 MMOL/L — SIGNIFICANT CHANGE UP (ref 22–31)
CREAT SERPL-MCNC: 0.53 MG/DL — SIGNIFICANT CHANGE UP (ref 0.5–1.3)
EOSINOPHIL # BLD AUTO: 0.35 K/UL — SIGNIFICANT CHANGE UP (ref 0–0.5)
EOSINOPHIL NFR BLD AUTO: 2.1 % — SIGNIFICANT CHANGE UP (ref 0–6)
GLUCOSE SERPL-MCNC: 98 MG/DL — SIGNIFICANT CHANGE UP (ref 70–99)
HCT VFR BLD CALC: 38.2 % — SIGNIFICANT CHANGE UP (ref 34.5–45)
HGB BLD-MCNC: 12 G/DL — SIGNIFICANT CHANGE UP (ref 11.5–15.5)
IMM GRANULOCYTES NFR BLD AUTO: 0.9 % — SIGNIFICANT CHANGE UP (ref 0–1.5)
LYMPHOCYTES # BLD AUTO: 1.69 K/UL — SIGNIFICANT CHANGE UP (ref 1–3.3)
LYMPHOCYTES # BLD AUTO: 9.9 % — LOW (ref 13–44)
MCHC RBC-ENTMCNC: 29.3 PG — SIGNIFICANT CHANGE UP (ref 27–34)
MCHC RBC-ENTMCNC: 31.4 GM/DL — LOW (ref 32–36)
MCV RBC AUTO: 93.4 FL — SIGNIFICANT CHANGE UP (ref 80–100)
MONOCYTES # BLD AUTO: 1.87 K/UL — HIGH (ref 0–0.9)
MONOCYTES NFR BLD AUTO: 11 % — SIGNIFICANT CHANGE UP (ref 2–14)
NEUTROPHILS # BLD AUTO: 12.83 K/UL — HIGH (ref 1.8–7.4)
NEUTROPHILS NFR BLD AUTO: 75.4 % — SIGNIFICANT CHANGE UP (ref 43–77)
NRBC # BLD: 0 /100 WBCS — SIGNIFICANT CHANGE UP (ref 0–0)
PLATELET # BLD AUTO: 419 K/UL — HIGH (ref 150–400)
POTASSIUM SERPL-MCNC: 4.8 MMOL/L — SIGNIFICANT CHANGE UP (ref 3.5–5.3)
POTASSIUM SERPL-SCNC: 4.8 MMOL/L — SIGNIFICANT CHANGE UP (ref 3.5–5.3)
PROT SERPL-MCNC: 7.6 G/DL — SIGNIFICANT CHANGE UP (ref 6–8.3)
RBC # BLD: 4.09 M/UL — SIGNIFICANT CHANGE UP (ref 3.8–5.2)
RBC # FLD: 13.9 % — SIGNIFICANT CHANGE UP (ref 10.3–14.5)
SODIUM SERPL-SCNC: 132 MMOL/L — LOW (ref 135–145)
WBC # BLD: 17.01 K/UL — HIGH (ref 3.8–10.5)
WBC # FLD AUTO: 17.01 K/UL — HIGH (ref 3.8–10.5)

## 2021-06-23 PROCEDURE — 80048 BASIC METABOLIC PNL TOTAL CA: CPT

## 2021-06-23 PROCEDURE — 87077 CULTURE AEROBIC IDENTIFY: CPT

## 2021-06-23 PROCEDURE — 72129 CT CHEST SPINE W/DYE: CPT

## 2021-06-23 PROCEDURE — 0225U NFCT DS DNA&RNA 21 SARSCOV2: CPT

## 2021-06-23 PROCEDURE — 84295 ASSAY OF SERUM SODIUM: CPT

## 2021-06-23 PROCEDURE — 82435 ASSAY OF BLOOD CHLORIDE: CPT

## 2021-06-23 PROCEDURE — 86140 C-REACTIVE PROTEIN: CPT

## 2021-06-23 PROCEDURE — 93320 DOPPLER ECHO COMPLETE: CPT

## 2021-06-23 PROCEDURE — U0003: CPT

## 2021-06-23 PROCEDURE — 97163 PT EVAL HIGH COMPLEX 45 MIN: CPT

## 2021-06-23 PROCEDURE — 82550 ASSAY OF CK (CPK): CPT

## 2021-06-23 PROCEDURE — 87040 BLOOD CULTURE FOR BACTERIA: CPT

## 2021-06-23 PROCEDURE — 84132 ASSAY OF SERUM POTASSIUM: CPT

## 2021-06-23 PROCEDURE — 93325 DOPPLER ECHO COLOR FLOW MAPG: CPT

## 2021-06-23 PROCEDURE — 20611 DRAIN/INJ JOINT/BURSA W/US: CPT

## 2021-06-23 PROCEDURE — 97110 THERAPEUTIC EXERCISES: CPT

## 2021-06-23 PROCEDURE — 80053 COMPREHEN METABOLIC PANEL: CPT

## 2021-06-23 PROCEDURE — 86850 RBC ANTIBODY SCREEN: CPT

## 2021-06-23 PROCEDURE — 97530 THERAPEUTIC ACTIVITIES: CPT

## 2021-06-23 PROCEDURE — 85652 RBC SED RATE AUTOMATED: CPT

## 2021-06-23 PROCEDURE — 73701 CT LOWER EXTREMITY W/DYE: CPT

## 2021-06-23 PROCEDURE — 99285 EMERGENCY DEPT VISIT HI MDM: CPT | Mod: 25

## 2021-06-23 PROCEDURE — 82330 ASSAY OF CALCIUM: CPT

## 2021-06-23 PROCEDURE — 86900 BLOOD TYPING SEROLOGIC ABO: CPT

## 2021-06-23 PROCEDURE — 87150 DNA/RNA AMPLIFIED PROBE: CPT

## 2021-06-23 PROCEDURE — 96374 THER/PROPH/DIAG INJ IV PUSH: CPT

## 2021-06-23 PROCEDURE — 81001 URINALYSIS AUTO W/SCOPE: CPT

## 2021-06-23 PROCEDURE — C8929: CPT

## 2021-06-23 PROCEDURE — 87640 STAPH A DNA AMP PROBE: CPT

## 2021-06-23 PROCEDURE — 87186 SC STD MICRODIL/AGAR DIL: CPT

## 2021-06-23 PROCEDURE — 80202 ASSAY OF VANCOMYCIN: CPT

## 2021-06-23 PROCEDURE — 83735 ASSAY OF MAGNESIUM: CPT

## 2021-06-23 PROCEDURE — 85027 COMPLETE CBC AUTOMATED: CPT

## 2021-06-23 PROCEDURE — 96361 HYDRATE IV INFUSION ADD-ON: CPT

## 2021-06-23 PROCEDURE — 87086 URINE CULTURE/COLONY COUNT: CPT

## 2021-06-23 PROCEDURE — 76376 3D RENDER W/INTRP POSTPROCES: CPT

## 2021-06-23 PROCEDURE — 86769 SARS-COV-2 COVID-19 ANTIBODY: CPT

## 2021-06-23 PROCEDURE — 72132 CT LUMBAR SPINE W/DYE: CPT

## 2021-06-23 PROCEDURE — 85018 HEMOGLOBIN: CPT

## 2021-06-23 PROCEDURE — 71045 X-RAY EXAM CHEST 1 VIEW: CPT

## 2021-06-23 PROCEDURE — 84100 ASSAY OF PHOSPHORUS: CPT

## 2021-06-23 PROCEDURE — 87641 MR-STAPH DNA AMP PROBE: CPT

## 2021-06-23 PROCEDURE — 85014 HEMATOCRIT: CPT

## 2021-06-23 PROCEDURE — 36569 INSJ PICC 5 YR+ W/O IMAGING: CPT

## 2021-06-23 PROCEDURE — 86901 BLOOD TYPING SEROLOGIC RH(D): CPT

## 2021-06-23 PROCEDURE — 85730 THROMBOPLASTIN TIME PARTIAL: CPT

## 2021-06-23 PROCEDURE — 93312 ECHO TRANSESOPHAGEAL: CPT

## 2021-06-23 PROCEDURE — 82947 ASSAY GLUCOSE BLOOD QUANT: CPT

## 2021-06-23 PROCEDURE — 97166 OT EVAL MOD COMPLEX 45 MIN: CPT

## 2021-06-23 PROCEDURE — 87205 SMEAR GRAM STAIN: CPT

## 2021-06-23 PROCEDURE — 84145 PROCALCITONIN (PCT): CPT

## 2021-06-23 PROCEDURE — 83605 ASSAY OF LACTIC ACID: CPT

## 2021-06-23 PROCEDURE — 73562 X-RAY EXAM OF KNEE 3: CPT

## 2021-06-23 PROCEDURE — C1751: CPT

## 2021-06-23 PROCEDURE — 85025 COMPLETE CBC W/AUTO DIFF WBC: CPT

## 2021-06-23 PROCEDURE — 87070 CULTURE OTHR SPECIMN AEROBIC: CPT

## 2021-06-23 PROCEDURE — 82803 BLOOD GASES ANY COMBINATION: CPT

## 2021-06-23 PROCEDURE — U0005: CPT

## 2021-06-23 PROCEDURE — 87075 CULTR BACTERIA EXCEPT BLOOD: CPT

## 2021-06-23 PROCEDURE — 85610 PROTHROMBIN TIME: CPT

## 2021-06-23 RX ORDER — AMLODIPINE BESYLATE 2.5 MG/1
1 TABLET ORAL
Qty: 0 | Refills: 0 | DISCHARGE

## 2021-06-23 RX ORDER — DULOXETINE HYDROCHLORIDE 30 MG/1
1 CAPSULE, DELAYED RELEASE ORAL
Qty: 0 | Refills: 0 | DISCHARGE
Start: 2021-06-23

## 2021-06-23 RX ORDER — DULOXETINE HYDROCHLORIDE 30 MG/1
1 CAPSULE, DELAYED RELEASE ORAL
Qty: 0 | Refills: 0 | DISCHARGE

## 2021-06-23 RX ORDER — LEVOTHYROXINE SODIUM 125 MCG
1 TABLET ORAL
Qty: 0 | Refills: 0 | DISCHARGE

## 2021-06-23 RX ORDER — SENNA PLUS 8.6 MG/1
2 TABLET ORAL
Qty: 0 | Refills: 0 | DISCHARGE
Start: 2021-06-23

## 2021-06-23 RX ORDER — LEVOTHYROXINE SODIUM 125 MCG
1 TABLET ORAL
Qty: 0 | Refills: 0 | DISCHARGE
Start: 2021-06-23

## 2021-06-23 RX ORDER — ACETAMINOPHEN 500 MG
2 TABLET ORAL
Qty: 0 | Refills: 0 | DISCHARGE
Start: 2021-06-23

## 2021-06-23 RX ORDER — BENAZEPRIL HYDROCHLORIDE 40 MG/1
1 TABLET ORAL
Qty: 0 | Refills: 0 | DISCHARGE

## 2021-06-23 RX ORDER — CHOLECALCIFEROL (VITAMIN D3) 125 MCG
1 CAPSULE ORAL
Qty: 0 | Refills: 0 | DISCHARGE

## 2021-06-23 RX ORDER — ATORVASTATIN CALCIUM 80 MG/1
1 TABLET, FILM COATED ORAL
Qty: 0 | Refills: 0 | DISCHARGE
Start: 2021-06-23

## 2021-06-23 RX ORDER — ATORVASTATIN CALCIUM 80 MG/1
1 TABLET, FILM COATED ORAL
Qty: 0 | Refills: 0 | DISCHARGE

## 2021-06-23 RX ORDER — PANTOPRAZOLE SODIUM 20 MG/1
1 TABLET, DELAYED RELEASE ORAL
Qty: 0 | Refills: 0 | DISCHARGE
Start: 2021-06-23

## 2021-06-23 RX ORDER — OXYCODONE HYDROCHLORIDE 5 MG/1
1 TABLET ORAL
Qty: 0 | Refills: 0 | DISCHARGE
Start: 2021-06-23

## 2021-06-23 RX ORDER — L.ACIDOPH/B.ANIMALIS/B.LONGUM 15B CELL
1 CAPSULE ORAL
Qty: 0 | Refills: 0 | DISCHARGE

## 2021-06-23 RX ORDER — ACETAMINOPHEN 500 MG
2 TABLET ORAL
Qty: 0 | Refills: 0 | DISCHARGE

## 2021-06-23 RX ORDER — DICLOFENAC SODIUM 30 MG/G
1 GEL TOPICAL
Qty: 0 | Refills: 0 | DISCHARGE

## 2021-06-23 RX ORDER — CALCIUM CARBONATE 500(1250)
1 TABLET ORAL
Qty: 0 | Refills: 0 | DISCHARGE

## 2021-06-23 RX ADMIN — Medication 1 APPLICATION(S): at 05:39

## 2021-06-23 RX ADMIN — PANTOPRAZOLE SODIUM 40 MILLIGRAM(S): 20 TABLET, DELAYED RELEASE ORAL at 05:37

## 2021-06-23 RX ADMIN — Medication 25 MICROGRAM(S): at 05:37

## 2021-06-23 RX ADMIN — OXYCODONE HYDROCHLORIDE 10 MILLIGRAM(S): 5 TABLET ORAL at 00:05

## 2021-06-23 RX ADMIN — HEPARIN SODIUM 5000 UNIT(S): 5000 INJECTION INTRAVENOUS; SUBCUTANEOUS at 05:37

## 2021-06-23 RX ADMIN — OXYCODONE HYDROCHLORIDE 10 MILLIGRAM(S): 5 TABLET ORAL at 05:47

## 2021-06-23 RX ADMIN — CHLORHEXIDINE GLUCONATE 1 APPLICATION(S): 213 SOLUTION TOPICAL at 10:18

## 2021-06-23 RX ADMIN — OXYCODONE HYDROCHLORIDE 10 MILLIGRAM(S): 5 TABLET ORAL at 05:37

## 2021-06-23 RX ADMIN — DULOXETINE HYDROCHLORIDE 60 MILLIGRAM(S): 30 CAPSULE, DELAYED RELEASE ORAL at 11:35

## 2021-06-23 RX ADMIN — Medication 100 MILLIGRAM(S): at 05:38

## 2021-06-23 RX ADMIN — Medication 1 APPLICATION(S): at 10:22

## 2021-06-23 RX ADMIN — OXYCODONE HYDROCHLORIDE 10 MILLIGRAM(S): 5 TABLET ORAL at 15:20

## 2021-06-23 RX ADMIN — OXYCODONE HYDROCHLORIDE 10 MILLIGRAM(S): 5 TABLET ORAL at 17:10

## 2021-06-23 RX ADMIN — OXYCODONE HYDROCHLORIDE 10 MILLIGRAM(S): 5 TABLET ORAL at 10:23

## 2021-06-23 RX ADMIN — HEPARIN SODIUM 5000 UNIT(S): 5000 INJECTION INTRAVENOUS; SUBCUTANEOUS at 11:35

## 2021-06-23 RX ADMIN — Medication 100 MILLIGRAM(S): at 10:22

## 2021-06-23 NOTE — PROGRESS NOTE ADULT - SUBJECTIVE AND OBJECTIVE BOX
Patient is a 71y old  Female who presents with a chief complaint of Left knee pain today (08 Jun 2021 17:01)      SUBJECTIVE / OVERNIGHT EVENTS: no events over night     T(C): 36.9 (06-23-21 @ 13:22), Max: 37.3 (06-23-21 @ 05:13)  HR: 98 (06-23-21 @ 13:22) (98 - 104)  BP: 148/87 (06-23-21 @ 13:22) (148/87 - 156/76)  RR: 19 (06-23-21 @ 13:22) (19 - 19)  SpO2: 93% (06-23-21 @ 13:22) (93% - 94%)      MEDICATIONS  (STANDING):  atorvastatin 10 milliGRAM(s) Oral at bedtime  ceFAZolin   IVPB 2000 milliGRAM(s) IV Intermittent every 6 hours  chlorhexidine 4% Liquid 1 Application(s) Topical daily  clotrimazole 1% Cream 1 Application(s) Topical two times a day  DULoxetine 60 milliGRAM(s) Oral daily  heparin   Injectable 5000 Unit(s) SubCutaneous every 8 hours  levothyroxine 25 MICROGram(s) Oral daily  pantoprazole    Tablet 40 milliGRAM(s) Oral before breakfast  senna 2 Tablet(s) Oral at bedtime  triamcinolone 0.1% Ointment 1 Application(s) Topical two times a day    MEDICATIONS  (PRN):  acetaminophen   Tablet .. 650 milliGRAM(s) Oral every 6 hours PRN Temp greater or equal to 38C (100.4F)  oxyCODONE    IR 10 milliGRAM(s) Oral every 4 hours PRN Moderate Pain (4 - 6)        PHYSICAL EXAM:  GENERAL: NAD, well-developed  HEAD:  Atraumatic, Normocephalic  EYES: EOMI, conjunctiva and sclera clear  NECK: Supple, No JVD  CHEST/LUNG: Clear to auscultation bilaterally; No wheeze  HEART: Regular rate and rhythm; No murmurs, rubs, or gallops  ABDOMEN: Soft, Nontender, Nondistended; Bowel sounds present  EXTREMITIES:  Left knee swollen, erythematous   2+ Peripheral Pulses, No clubbing, cyanosis, or edema  PSYCH: AAOx3  NEUROLOGY: non-focal  SKIN: No rashes or lesions                                                                 12.0   17.01 )-----------( 419      ( 23 Jun 2021 08:44 )             38.2           LIVER FUNCTIONS - ( 23 Jun 2021 08:43 )  Alb: 3.1 g/dL / Pro: 7.6 g/dL / ALK PHOS: 81 U/L / ALT: 5 U/L / AST: 27 U/L / GGT: x             132|92|12<98  4.8|23|0.53  9.2,--,--  06-23 @ 08:43      CAPILLARY BLOOD GLUCOSE      RADIOLOGY & ADDITIONAL TESTS:    Imaging Personally Reviewed:    Consultant(s) Notes Reviewed:      Care Discussed with Consultants/Other Providers:

## 2021-06-23 NOTE — PROGRESS NOTE ADULT - NUTRITIONAL ASSESSMENT
This patient has been assessed with a concern for Malnutrition and has been determined to have a diagnosis/diagnoses of Morbid obesity (BMI > 40).    This patient is being managed with:   Diet Regular-  Entered: Jun 11 2021  6:40AM    
This patient has been assessed with a concern for Malnutrition and has been determined to have a diagnosis/diagnoses of Morbid obesity (BMI > 40).    This patient is being managed with:   Diet NPO after Midnight-     NPO Start Date: 14-Jun-2021   NPO Start Time: 23:59  Except Medications  Entered: Jun 14 2021  3:48PM    Diet Regular-  Entered: Jun 11 2021  6:40AM    
This patient has been assessed with a concern for Malnutrition and has been determined to have a diagnosis/diagnoses of Morbid obesity (BMI > 40).    This patient is being managed with:   Diet Regular-  Entered: Jun 11 2021  6:40AM    
This patient has been assessed with a concern for Malnutrition and has been determined to have a diagnosis/diagnoses of Morbid obesity (BMI > 40).    This patient is being managed with:   Diet NPO-  Entered: Jorge 10 2021  5:20PM    
This patient has been assessed with a concern for Malnutrition and has been determined to have a diagnosis/diagnoses of Morbid obesity (BMI > 40).    This patient is being managed with:   Diet Regular-  Entered: Jun 11 2021  6:40AM    
This patient has been assessed with a concern for Malnutrition and has been determined to have a diagnosis/diagnoses of Morbid obesity (BMI > 40).    This patient is being managed with:   Diet Regular-  Toni(7 Gm Arginine/7 Gm Glut/1.2 Gm HMB     Qty per Day:  2  Entered: Jun 9 2021  1:00PM    Diet NPO after Midnight-     NPO Start Date: 08-Jun-2021   NPO Start Time: 23:59  Except Medications  Entered: Jun 8 2021  9:07PM

## 2021-06-23 NOTE — PROGRESS NOTE ADULT - PROVIDER SPECIALTY LIST ADULT
Cardiology
Cardiology
Hospitalist
Infectious Disease
Orthopedics
Cardiology
Hospitalist
Hospitalist
Infectious Disease
Orthopedics
Cardiology
Hospitalist
Hospitalist
Infectious Disease
Orthopedics
Cardiology
Hospitalist
Infectious Disease
Hospitalist
Infectious Disease
Orthopedics
Orthopedics
Hospitalist

## 2021-06-23 NOTE — PROGRESS NOTE ADULT - REASON FOR ADMISSION
Left knee pain today

## 2021-06-23 NOTE — PROGRESS NOTE ADULT - NSICDXPILOT_GEN_ALL_CORE
[place]

## 2021-06-23 NOTE — PROGRESS NOTE ADULT - SUBJECTIVE AND OBJECTIVE BOX
CARDIOLOGY ATTENDING    no tele    denies palpitations, syncope, nor angina, ROS otherwise -          Review of Systems:   Constitutional: [ ] fevers, [ ] chills.   Skin: [ ] dry skin. [ ] rashes.  Psychiatric: [ ] depression, [ ] anxiety.   Gastrointestinal: [ ] BRBPR, [ ] melena.   Neurological: [ ] confusion. [ ] seizures. [ ] shuffling gait.   Ears,Nose,Mouth and Throat: [ ] ear pain [ ] sore throat.   Eyes: [ ] diplopia.   Respiratory: [ ] hemoptysis. [ ] shortness of breath  Cardiovascular: See HPI above  Hematologic/Lymphatic: [ ] anemia. [ ] painful nodes. [ ] prolonged bleeding.   Genitourinary: [ ] hematuria. [ ] flank pain.   Endocrine: [ ] significant change in weight. [ ] intolerance to heat and cold.     Review of systems [ x] otherwise negative, [ ] otherwise unable to obtain    FH: no family history of sudden cardiac death in first degree relatives    SH: [ ] tobacco, [ ] alcohol, [ ] drugs    acetaminophen   Tablet .. 650 milliGRAM(s) Oral every 6 hours PRN  atorvastatin 10 milliGRAM(s) Oral at bedtime  ceFAZolin   IVPB 2000 milliGRAM(s) IV Intermittent every 6 hours  chlorhexidine 4% Liquid 1 Application(s) Topical daily  clotrimazole 1% Cream 1 Application(s) Topical two times a day  DULoxetine 60 milliGRAM(s) Oral daily  heparin   Injectable 5000 Unit(s) SubCutaneous every 8 hours  levothyroxine 25 MICROGram(s) Oral daily  oxyCODONE    IR 10 milliGRAM(s) Oral every 4 hours PRN  pantoprazole    Tablet 40 milliGRAM(s) Oral before breakfast  senna 2 Tablet(s) Oral at bedtime  triamcinolone 0.1% Ointment 1 Application(s) Topical two times a day                            12.0   17.01 )-----------( 419      ( 23 Jun 2021 08:44 )             38.2       06-23    132<L>  |  92<L>  |  12  ----------------------------<  98  4.8   |  23  |  0.53    Ca    9.2      23 Jun 2021 08:43    TPro  7.6  /  Alb  3.1<L>  /  TBili  0.4  /  DBili  x   /  AST  27  /  ALT  5<L>  /  AlkPhos  81  06-23            T(C): 37.3 (06-23-21 @ 05:13), Max: 37.3 (06-22-21 @ 20:43)  HR: 104 (06-23-21 @ 05:13) (90 - 104)  BP: 156/76 (06-23-21 @ 05:13) (140/86 - 156/76)  RR: 19 (06-23-21 @ 05:13) (19 - 19)  SpO2: 94% (06-23-21 @ 05:13) (94% - 95%)  Wt(kg): --    I&O's Summary    22 Jun 2021 07:01  -  23 Jun 2021 07:00  --------------------------------------------------------  IN: 1420 mL / OUT: 2000 mL / NET: -580 mL        General: Well nourished in no acute distress. Alert and Oriented * 3.   Head: Normocephalic and atraumatic.   Neck: No JVD. No bruits. Supple. Does not appear to be enlarged.   Cardiovascular: + S1,S2 ; RRR Soft systolic murmur at the left lower sternal border. No rubs noted.    Lungs: CTA b/l. No rhonchi, rales or wheezes.   Abdomen: + BS, soft. Non tender. Non distended. No rebound. No guarding.   Extremities: No clubbing/cyanosis/edema.   Neurologic: Moves all four extremities. Full range of motion.   Skin: Warm and moist. The patient's skin has normal elasticity and good skin turgor.   Psychiatric: Appropriate mood and affect.  Musculoskeletal: Normal range of motion, normal strength      A/P) Patient is a 70 y/o female PMH b/l lymphedema, HTN, and hypothyroidism who presented with L knee pain and inability to walk concerning for septic joint and found to have staph aureus bacteremia. Cardiology consulted for preop clearance.  S/p I&D of L knee on 6/10. DENIS negative for endocarditis    - No evidence of clinical HF or anginal symptoms  - Ortho follow up - s/p L knee I&D  - Abx per ID - s/p PICC, repeat prelim BCx negative, trend WBC  - TTE noted difficult study, preserved LVEF   - CT spine noted  - Derm eval appreciated  - No further inpatient cardiac w/u planned

## 2021-06-23 NOTE — DISCHARGE NOTE NURSING/CASE MANAGEMENT/SOCIAL WORK - NSTOBACCONEVERSMOKERY/N_GEN_A
My previous office note on this patient was not clear that I would like Dr. Sidhu of sleep medicine to see the patient regarding her abnormal sleep study and prescribe and manage her CPAP.  She is then to return to me after she has been on the CPAP for about 3 months.   No

## 2021-06-23 NOTE — DISCHARGE NOTE NURSING/CASE MANAGEMENT/SOCIAL WORK - PATIENT PORTAL LINK FT
You can access the FollowMyHealth Patient Portal offered by [facility] by registering at the following website: [email or web address]. By joining [employer]’s FollowMyHealth portal, you will also be able to view your health information using other applications (apps) compatible with our system.

## 2021-10-08 NOTE — PATIENT PROFILE ADULT - NSPROSPHOSPCHAPLAINYN_GEN_A_NUR
Problem: Knowledge Deficit  Goal: *Verbalizes understanding of procedures and medications  Outcome: Progressing Towards Goal yes

## 2023-12-09 ENCOUNTER — INPATIENT (INPATIENT)
Facility: HOSPITAL | Age: 73
LOS: 5 days | Discharge: NURSING, REHAB OR LONG-TERM CARE | DRG: 549 | End: 2023-12-15
Attending: INTERNAL MEDICINE | Admitting: INTERNAL MEDICINE
Payer: MEDICARE

## 2023-12-09 VITALS
HEIGHT: 65 IN | DIASTOLIC BLOOD PRESSURE: 81 MMHG | OXYGEN SATURATION: 95 % | TEMPERATURE: 98 F | WEIGHT: 293 LBS | HEART RATE: 92 BPM | RESPIRATION RATE: 20 BRPM | SYSTOLIC BLOOD PRESSURE: 139 MMHG

## 2023-12-09 DIAGNOSIS — L03.116 CELLULITIS OF LEFT LOWER LIMB: ICD-10-CM

## 2023-12-09 PROBLEM — I10 ESSENTIAL (PRIMARY) HYPERTENSION: Chronic | Status: ACTIVE | Noted: 2021-06-06

## 2023-12-09 PROBLEM — E66.01 MORBID (SEVERE) OBESITY DUE TO EXCESS CALORIES: Chronic | Status: ACTIVE | Noted: 2021-06-06

## 2023-12-09 PROBLEM — I89.0 LYMPHEDEMA, NOT ELSEWHERE CLASSIFIED: Chronic | Status: ACTIVE | Noted: 2021-06-06

## 2023-12-09 PROBLEM — E03.9 HYPOTHYROIDISM, UNSPECIFIED: Chronic | Status: ACTIVE | Noted: 2021-06-06

## 2023-12-09 LAB
ALBUMIN SERPL ELPH-MCNC: 3.8 G/DL — SIGNIFICANT CHANGE UP (ref 3.3–5)
ALBUMIN SERPL ELPH-MCNC: 3.8 G/DL — SIGNIFICANT CHANGE UP (ref 3.3–5)
ALP SERPL-CCNC: 92 U/L — SIGNIFICANT CHANGE UP (ref 40–120)
ALP SERPL-CCNC: 92 U/L — SIGNIFICANT CHANGE UP (ref 40–120)
ALT FLD-CCNC: 9 U/L — LOW (ref 10–45)
ALT FLD-CCNC: 9 U/L — LOW (ref 10–45)
ANION GAP SERPL CALC-SCNC: 14 MMOL/L — SIGNIFICANT CHANGE UP (ref 5–17)
ANION GAP SERPL CALC-SCNC: 14 MMOL/L — SIGNIFICANT CHANGE UP (ref 5–17)
APTT BLD: 35.2 SEC — SIGNIFICANT CHANGE UP (ref 24.5–35.6)
APTT BLD: 35.2 SEC — SIGNIFICANT CHANGE UP (ref 24.5–35.6)
AST SERPL-CCNC: 12 U/L — SIGNIFICANT CHANGE UP (ref 10–40)
AST SERPL-CCNC: 12 U/L — SIGNIFICANT CHANGE UP (ref 10–40)
BASOPHILS # BLD AUTO: 0.07 K/UL — SIGNIFICANT CHANGE UP (ref 0–0.2)
BASOPHILS # BLD AUTO: 0.07 K/UL — SIGNIFICANT CHANGE UP (ref 0–0.2)
BASOPHILS NFR BLD AUTO: 0.6 % — SIGNIFICANT CHANGE UP (ref 0–2)
BASOPHILS NFR BLD AUTO: 0.6 % — SIGNIFICANT CHANGE UP (ref 0–2)
BILIRUB SERPL-MCNC: 0.3 MG/DL — SIGNIFICANT CHANGE UP (ref 0.2–1.2)
BILIRUB SERPL-MCNC: 0.3 MG/DL — SIGNIFICANT CHANGE UP (ref 0.2–1.2)
BLD GP AB SCN SERPL QL: NEGATIVE — SIGNIFICANT CHANGE UP
BLD GP AB SCN SERPL QL: NEGATIVE — SIGNIFICANT CHANGE UP
BUN SERPL-MCNC: 15 MG/DL — SIGNIFICANT CHANGE UP (ref 7–23)
BUN SERPL-MCNC: 15 MG/DL — SIGNIFICANT CHANGE UP (ref 7–23)
CALCIUM SERPL-MCNC: 9.5 MG/DL — SIGNIFICANT CHANGE UP (ref 8.4–10.5)
CALCIUM SERPL-MCNC: 9.5 MG/DL — SIGNIFICANT CHANGE UP (ref 8.4–10.5)
CHLORIDE SERPL-SCNC: 99 MMOL/L — SIGNIFICANT CHANGE UP (ref 96–108)
CHLORIDE SERPL-SCNC: 99 MMOL/L — SIGNIFICANT CHANGE UP (ref 96–108)
CO2 SERPL-SCNC: 27 MMOL/L — SIGNIFICANT CHANGE UP (ref 22–31)
CO2 SERPL-SCNC: 27 MMOL/L — SIGNIFICANT CHANGE UP (ref 22–31)
CREAT SERPL-MCNC: 0.57 MG/DL — SIGNIFICANT CHANGE UP (ref 0.5–1.3)
CREAT SERPL-MCNC: 0.57 MG/DL — SIGNIFICANT CHANGE UP (ref 0.5–1.3)
EGFR: 96 ML/MIN/1.73M2 — SIGNIFICANT CHANGE UP
EGFR: 96 ML/MIN/1.73M2 — SIGNIFICANT CHANGE UP
EOSINOPHIL # BLD AUTO: 0.14 K/UL — SIGNIFICANT CHANGE UP (ref 0–0.5)
EOSINOPHIL # BLD AUTO: 0.14 K/UL — SIGNIFICANT CHANGE UP (ref 0–0.5)
EOSINOPHIL NFR BLD AUTO: 1.1 % — SIGNIFICANT CHANGE UP (ref 0–6)
EOSINOPHIL NFR BLD AUTO: 1.1 % — SIGNIFICANT CHANGE UP (ref 0–6)
GLUCOSE SERPL-MCNC: 102 MG/DL — HIGH (ref 70–99)
GLUCOSE SERPL-MCNC: 102 MG/DL — HIGH (ref 70–99)
HCT VFR BLD CALC: 37.1 % — SIGNIFICANT CHANGE UP (ref 34.5–45)
HCT VFR BLD CALC: 37.1 % — SIGNIFICANT CHANGE UP (ref 34.5–45)
HGB BLD-MCNC: 10.8 G/DL — LOW (ref 11.5–15.5)
HGB BLD-MCNC: 10.8 G/DL — LOW (ref 11.5–15.5)
IMM GRANULOCYTES NFR BLD AUTO: 0.6 % — SIGNIFICANT CHANGE UP (ref 0–0.9)
IMM GRANULOCYTES NFR BLD AUTO: 0.6 % — SIGNIFICANT CHANGE UP (ref 0–0.9)
INR BLD: 1.55 RATIO — HIGH (ref 0.85–1.18)
INR BLD: 1.55 RATIO — HIGH (ref 0.85–1.18)
LYMPHOCYTES # BLD AUTO: 1.34 K/UL — SIGNIFICANT CHANGE UP (ref 1–3.3)
LYMPHOCYTES # BLD AUTO: 1.34 K/UL — SIGNIFICANT CHANGE UP (ref 1–3.3)
LYMPHOCYTES # BLD AUTO: 10.8 % — LOW (ref 13–44)
LYMPHOCYTES # BLD AUTO: 10.8 % — LOW (ref 13–44)
MCHC RBC-ENTMCNC: 24.5 PG — LOW (ref 27–34)
MCHC RBC-ENTMCNC: 24.5 PG — LOW (ref 27–34)
MCHC RBC-ENTMCNC: 29.1 GM/DL — LOW (ref 32–36)
MCHC RBC-ENTMCNC: 29.1 GM/DL — LOW (ref 32–36)
MCV RBC AUTO: 84.1 FL — SIGNIFICANT CHANGE UP (ref 80–100)
MCV RBC AUTO: 84.1 FL — SIGNIFICANT CHANGE UP (ref 80–100)
MONOCYTES # BLD AUTO: 1.15 K/UL — HIGH (ref 0–0.9)
MONOCYTES # BLD AUTO: 1.15 K/UL — HIGH (ref 0–0.9)
MONOCYTES NFR BLD AUTO: 9.2 % — SIGNIFICANT CHANGE UP (ref 2–14)
MONOCYTES NFR BLD AUTO: 9.2 % — SIGNIFICANT CHANGE UP (ref 2–14)
NEUTROPHILS # BLD AUTO: 9.68 K/UL — HIGH (ref 1.8–7.4)
NEUTROPHILS # BLD AUTO: 9.68 K/UL — HIGH (ref 1.8–7.4)
NEUTROPHILS NFR BLD AUTO: 77.7 % — HIGH (ref 43–77)
NEUTROPHILS NFR BLD AUTO: 77.7 % — HIGH (ref 43–77)
NRBC # BLD: 0 /100 WBCS — SIGNIFICANT CHANGE UP (ref 0–0)
NRBC # BLD: 0 /100 WBCS — SIGNIFICANT CHANGE UP (ref 0–0)
PLATELET # BLD AUTO: 464 K/UL — HIGH (ref 150–400)
PLATELET # BLD AUTO: 464 K/UL — HIGH (ref 150–400)
POTASSIUM SERPL-MCNC: 4.6 MMOL/L — SIGNIFICANT CHANGE UP (ref 3.5–5.3)
POTASSIUM SERPL-MCNC: 4.6 MMOL/L — SIGNIFICANT CHANGE UP (ref 3.5–5.3)
POTASSIUM SERPL-SCNC: 4.6 MMOL/L — SIGNIFICANT CHANGE UP (ref 3.5–5.3)
POTASSIUM SERPL-SCNC: 4.6 MMOL/L — SIGNIFICANT CHANGE UP (ref 3.5–5.3)
PROT SERPL-MCNC: 8.2 G/DL — SIGNIFICANT CHANGE UP (ref 6–8.3)
PROT SERPL-MCNC: 8.2 G/DL — SIGNIFICANT CHANGE UP (ref 6–8.3)
PROTHROM AB SERPL-ACNC: 16.8 SEC — HIGH (ref 9.5–13)
PROTHROM AB SERPL-ACNC: 16.8 SEC — HIGH (ref 9.5–13)
RBC # BLD: 4.41 M/UL — SIGNIFICANT CHANGE UP (ref 3.8–5.2)
RBC # BLD: 4.41 M/UL — SIGNIFICANT CHANGE UP (ref 3.8–5.2)
RBC # FLD: 15.7 % — HIGH (ref 10.3–14.5)
RBC # FLD: 15.7 % — HIGH (ref 10.3–14.5)
RH IG SCN BLD-IMP: POSITIVE — SIGNIFICANT CHANGE UP
RH IG SCN BLD-IMP: POSITIVE — SIGNIFICANT CHANGE UP
SODIUM SERPL-SCNC: 140 MMOL/L — SIGNIFICANT CHANGE UP (ref 135–145)
SODIUM SERPL-SCNC: 140 MMOL/L — SIGNIFICANT CHANGE UP (ref 135–145)
WBC # BLD: 12.45 K/UL — HIGH (ref 3.8–10.5)
WBC # BLD: 12.45 K/UL — HIGH (ref 3.8–10.5)
WBC # FLD AUTO: 12.45 K/UL — HIGH (ref 3.8–10.5)
WBC # FLD AUTO: 12.45 K/UL — HIGH (ref 3.8–10.5)

## 2023-12-09 PROCEDURE — 99285 EMERGENCY DEPT VISIT HI MDM: CPT | Mod: GC

## 2023-12-09 PROCEDURE — 93970 EXTREMITY STUDY: CPT | Mod: 26

## 2023-12-09 PROCEDURE — 73562 X-RAY EXAM OF KNEE 3: CPT | Mod: 26,LT

## 2023-12-09 RX ORDER — VANCOMYCIN HCL 1 G
1000 VIAL (EA) INTRAVENOUS ONCE
Refills: 0 | Status: COMPLETED | OUTPATIENT
Start: 2023-12-09 | End: 2023-12-09

## 2023-12-09 RX ORDER — MORPHINE SULFATE 50 MG/1
4 CAPSULE, EXTENDED RELEASE ORAL ONCE
Refills: 0 | Status: DISCONTINUED | OUTPATIENT
Start: 2023-12-09 | End: 2023-12-09

## 2023-12-09 RX ORDER — PIPERACILLIN AND TAZOBACTAM 4; .5 G/20ML; G/20ML
3.38 INJECTION, POWDER, LYOPHILIZED, FOR SOLUTION INTRAVENOUS ONCE
Refills: 0 | Status: COMPLETED | OUTPATIENT
Start: 2023-12-09 | End: 2023-12-09

## 2023-12-09 RX ADMIN — Medication 250 MILLIGRAM(S): at 22:35

## 2023-12-09 RX ADMIN — MORPHINE SULFATE 4 MILLIGRAM(S): 50 CAPSULE, EXTENDED RELEASE ORAL at 22:43

## 2023-12-09 RX ADMIN — PIPERACILLIN AND TAZOBACTAM 200 GRAM(S): 4; .5 INJECTION, POWDER, LYOPHILIZED, FOR SOLUTION INTRAVENOUS at 21:17

## 2023-12-09 RX ADMIN — MORPHINE SULFATE 4 MILLIGRAM(S): 50 CAPSULE, EXTENDED RELEASE ORAL at 21:17

## 2023-12-09 RX ADMIN — MORPHINE SULFATE 4 MILLIGRAM(S): 50 CAPSULE, EXTENDED RELEASE ORAL at 17:52

## 2023-12-09 NOTE — ED ADULT NURSE NOTE - NSFALLHARMRISKINTERV_ED_ALL_ED
Communicate risk of Fall with Harm to all staff, patient, and family/Provide visual cue: red socks, yellow wristband, yellow gown, etc/Reinforce activity limits and safety measures with patient and family/Bed in lowest position, wheels locked, appropriate side rails in place/Call bell, personal items and telephone in reach/Instruct patient to call for assistance before getting out of bed/chair/stretcher/Non-slip footwear applied when patient is off stretcher/[place] to call system/Physically safe environment - no spills, clutter or unnecessary equipment/Purposeful Proactive Rounding/Room/bathroom lighting operational, light cord in reach Communicate risk of Fall with Harm to all staff, patient, and family/Provide visual cue: red socks, yellow wristband, yellow gown, etc/Reinforce activity limits and safety measures with patient and family/Bed in lowest position, wheels locked, appropriate side rails in place/Call bell, personal items and telephone in reach/Instruct patient to call for assistance before getting out of bed/chair/stretcher/Non-slip footwear applied when patient is off stretcher/[place] to call system/Physically safe environment - no spills, clutter or unnecessary equipment/Purposeful Proactive Rounding/Room/bathroom lighting operational, light cord in reach

## 2023-12-09 NOTE — ED PROVIDER NOTE - OBJECTIVE STATEMENT
72 y/o F history of lymphedema, atrial fibrillation, hypertension hypothyroidism presents with left knee pain x 1 week.  Patient states that she developed a fever about 2 weeks ago at home visit and worsening left knee pain put on levofloxacin empirically.  However over the past week patient has worsening left knee pain clicking and cannot ambulate.  Ambulates at baseline with a walker.  Of note patient was admitted in 2021 for left knee pain requiring IR guided arthrocentesis complicated by Staph aureus bacteremia requiring IV antibiotics.  Patient has no other complaints other than left knee pain.  Patient does not report chest pain shortness of breath chills nausea vomiting recent travel sick contacts or falls.    General: uncomfortable appearing   HEENT: neck supple, anicteric sclera  Cardiovascular: Normal s1, s2, RRR  Respiratory: CTA b/l   Abdominal: Soft, ntnd  Extremities: No swelling in LEs  Neurologic: Chronically and diffusely swollen bilateral lower extremities with  Intervention on anterior shin and foot which is as per patient  Tenderness to left knee palpation.    Psych: Awake, alert answering questions appropriately

## 2023-12-09 NOTE — ED ADULT NURSE REASSESSMENT NOTE - NS ED NURSE REASSESS COMMENT FT1
pt returned from US. Pt cleansed, primafit replaced and patient repositioned in hospital bed with pillows and blankets. New gown provided to patient.

## 2023-12-09 NOTE — ED ADULT NURSE NOTE - OBJECTIVE STATEMENT
PT is a 73 yr old female with pmh of lymphedema, afib on eliquis and chronic cellulitis in the left leg coming from home via ems for leg pain. PT states she has had various bouts of cellulitis every three months for the last few years- pt had a wash out of the left leg and has not had an infection for the last year. PT states two weeks ago she started having fevers, body aches, chills, and left leg pain. PT had blood work done and was placed on abx by PMD. PT then this week started to decline in ambulation (normally can use a walker to go to the bathroom/shower) and pt feels weak in the left leg. PT called ems due to this decline. Pt is a/ox 3- vitals initially showing tachycardia and diastolic hypertension due to pain. PT skin intact- some IAD to the abd folds and folds in her legs due to lymphedema- but no obvious skin breakdown. PT has redness to the left leg which patient states is chronic. PT placed on hospital bed for comfort- primafit in place- pillows placed under left knee for pain relief.

## 2023-12-09 NOTE — ED PROVIDER NOTE - CLINICAL SUMMARY MEDICAL DECISION MAKING FREE TEXT BOX
74 y/o F history of lymphedema, atrial fibrillation, hypertension hypothyroidism presents with left knee pain x 1 week possible cellulitis vs intrarticular pathology given hx vs DVT. PE remarkable for extremely swollen legs which are baseline and TTP of L knee. Labs, xr, doppler, abx, dispo pending. 72 y/o F history of lymphedema, atrial fibrillation, hypertension hypothyroidism presents with left knee pain x 1 week possible cellulitis vs intrarticular pathology given hx vs DVT. PE remarkable for extremely swollen legs which are baseline and TTP of L knee. Labs, xr, doppler, abx, dispo pending.

## 2023-12-09 NOTE — ED PROVIDER NOTE - ATTENDING CONTRIBUTION TO CARE
Manny Medina MD:  I personally saw the patient and performed a substantive portion of the visit including all aspects of the medical decision making.    MDM: 73-year-old female with history as seen in HPI above including atrial fibrillation on DOAC, who presents with left knee pain and left lower extremity swelling and redness.  Patient being treated with p.o. Levaquin.  Denies chest pain, shortness of breath, syncope.    On examination, patient is tachycardic, uncomfortable appearing, lungs CTAB, abdomen soft nontender, obese, tenderness over the left knee and anterior shin with erythema, left greater than right swelling.  Neuro vas intact distally with soft compartments.    Concern for cellulitis, will start on empiric antibiotics.  Will obtain duplex ultrasound to rule out DVT, x-ray to eval for acute bony pathology.  Will obtain labs to evaluate for hematologic disorder, metabolic derangements, hepatic and renal function, and screen for infection.    Labs with leukocytosis to 12.45, electrolytes within normal range.  [facility] duplex limited study but no sign of DVT.  Patient already on DOAC.  X-ray knee with no acute fracture, but shows chronic dislocation of patella,.    The patient will need to be admitted to the hospital for continued evaluation and management.  Discussed with the accepting physician regarding the initial presentation, diagnostic studies, treatments given in the ED, and current plan of care.   The patient was accepted by and endorsed to the medicine team.    Differential includes but is not limited to: See above    Patient with new problems requiring additional work-up and treatment, following orders: see above  Discussed case with: Admitting physician  Obtained and reviewed external records: Prior discharge note  Additional history obtained from: EMS  Chronic conditions and social determinants of health affecting care: see above  Consideration of: admission

## 2023-12-10 DIAGNOSIS — M25.562 PAIN IN LEFT KNEE: ICD-10-CM

## 2023-12-10 DIAGNOSIS — R79.1 ABNORMAL COAGULATION PROFILE: ICD-10-CM

## 2023-12-10 DIAGNOSIS — Z29.9 ENCOUNTER FOR PROPHYLACTIC MEASURES, UNSPECIFIED: ICD-10-CM

## 2023-12-10 DIAGNOSIS — I48.20 CHRONIC ATRIAL FIBRILLATION, UNSPECIFIED: ICD-10-CM

## 2023-12-10 DIAGNOSIS — E78.5 HYPERLIPIDEMIA, UNSPECIFIED: ICD-10-CM

## 2023-12-10 DIAGNOSIS — Z90.49 ACQUIRED ABSENCE OF OTHER SPECIFIED PARTS OF DIGESTIVE TRACT: Chronic | ICD-10-CM

## 2023-12-10 DIAGNOSIS — R65.10 SYSTEMIC INFLAMMATORY RESPONSE SYNDROME (SIRS) OF NON-INFECTIOUS ORIGIN WITHOUT ACUTE ORGAN DYSFUNCTION: ICD-10-CM

## 2023-12-10 DIAGNOSIS — E03.9 HYPOTHYROIDISM, UNSPECIFIED: ICD-10-CM

## 2023-12-10 DIAGNOSIS — Z98.890 OTHER SPECIFIED POSTPROCEDURAL STATES: Chronic | ICD-10-CM

## 2023-12-10 DIAGNOSIS — I10 ESSENTIAL (PRIMARY) HYPERTENSION: ICD-10-CM

## 2023-12-10 DIAGNOSIS — D64.9 ANEMIA, UNSPECIFIED: ICD-10-CM

## 2023-12-10 DIAGNOSIS — D75.839 THROMBOCYTOSIS, UNSPECIFIED: ICD-10-CM

## 2023-12-10 DIAGNOSIS — I89.0 LYMPHEDEMA, NOT ELSEWHERE CLASSIFIED: ICD-10-CM

## 2023-12-10 LAB
-  STAPHYLOCOCCUS EPIDERMIDIS, METHICILLIN RESISTANT: SIGNIFICANT CHANGE UP
-  STAPHYLOCOCCUS EPIDERMIDIS, METHICILLIN RESISTANT: SIGNIFICANT CHANGE UP
ANION GAP SERPL CALC-SCNC: 8 MMOL/L — SIGNIFICANT CHANGE UP (ref 5–17)
ANION GAP SERPL CALC-SCNC: 8 MMOL/L — SIGNIFICANT CHANGE UP (ref 5–17)
BASOPHILS # BLD AUTO: 0.07 K/UL — SIGNIFICANT CHANGE UP (ref 0–0.2)
BASOPHILS # BLD AUTO: 0.07 K/UL — SIGNIFICANT CHANGE UP (ref 0–0.2)
BASOPHILS NFR BLD AUTO: 0.6 % — SIGNIFICANT CHANGE UP (ref 0–2)
BASOPHILS NFR BLD AUTO: 0.6 % — SIGNIFICANT CHANGE UP (ref 0–2)
BUN SERPL-MCNC: 13 MG/DL — SIGNIFICANT CHANGE UP (ref 7–23)
BUN SERPL-MCNC: 13 MG/DL — SIGNIFICANT CHANGE UP (ref 7–23)
CALCIUM SERPL-MCNC: 9.6 MG/DL — SIGNIFICANT CHANGE UP (ref 8.4–10.5)
CALCIUM SERPL-MCNC: 9.6 MG/DL — SIGNIFICANT CHANGE UP (ref 8.4–10.5)
CHLORIDE SERPL-SCNC: 98 MMOL/L — SIGNIFICANT CHANGE UP (ref 96–108)
CHLORIDE SERPL-SCNC: 98 MMOL/L — SIGNIFICANT CHANGE UP (ref 96–108)
CO2 SERPL-SCNC: 31 MMOL/L — SIGNIFICANT CHANGE UP (ref 22–31)
CO2 SERPL-SCNC: 31 MMOL/L — SIGNIFICANT CHANGE UP (ref 22–31)
CREAT SERPL-MCNC: 0.65 MG/DL — SIGNIFICANT CHANGE UP (ref 0.5–1.3)
CREAT SERPL-MCNC: 0.65 MG/DL — SIGNIFICANT CHANGE UP (ref 0.5–1.3)
CRP SERPL-MCNC: 39 MG/L — HIGH (ref 0–4)
CRP SERPL-MCNC: 39 MG/L — HIGH (ref 0–4)
EGFR: 93 ML/MIN/1.73M2 — SIGNIFICANT CHANGE UP
EGFR: 93 ML/MIN/1.73M2 — SIGNIFICANT CHANGE UP
EOSINOPHIL # BLD AUTO: 0.1 K/UL — SIGNIFICANT CHANGE UP (ref 0–0.5)
EOSINOPHIL # BLD AUTO: 0.1 K/UL — SIGNIFICANT CHANGE UP (ref 0–0.5)
EOSINOPHIL NFR BLD AUTO: 0.9 % — SIGNIFICANT CHANGE UP (ref 0–6)
EOSINOPHIL NFR BLD AUTO: 0.9 % — SIGNIFICANT CHANGE UP (ref 0–6)
ERYTHROCYTE [SEDIMENTATION RATE] IN BLOOD: 120 MM/HR — HIGH (ref 0–20)
ERYTHROCYTE [SEDIMENTATION RATE] IN BLOOD: 120 MM/HR — HIGH (ref 0–20)
GLUCOSE BLDC GLUCOMTR-MCNC: 117 MG/DL — HIGH (ref 70–99)
GLUCOSE BLDC GLUCOMTR-MCNC: 117 MG/DL — HIGH (ref 70–99)
GLUCOSE SERPL-MCNC: 108 MG/DL — HIGH (ref 70–99)
GLUCOSE SERPL-MCNC: 108 MG/DL — HIGH (ref 70–99)
GRAM STN FLD: ABNORMAL
HCT VFR BLD CALC: 36.4 % — SIGNIFICANT CHANGE UP (ref 34.5–45)
HCT VFR BLD CALC: 36.4 % — SIGNIFICANT CHANGE UP (ref 34.5–45)
HGB BLD-MCNC: 10.8 G/DL — LOW (ref 11.5–15.5)
HGB BLD-MCNC: 10.8 G/DL — LOW (ref 11.5–15.5)
IMM GRANULOCYTES NFR BLD AUTO: 0.4 % — SIGNIFICANT CHANGE UP (ref 0–0.9)
IMM GRANULOCYTES NFR BLD AUTO: 0.4 % — SIGNIFICANT CHANGE UP (ref 0–0.9)
LYMPHOCYTES # BLD AUTO: 1.59 K/UL — SIGNIFICANT CHANGE UP (ref 1–3.3)
LYMPHOCYTES # BLD AUTO: 1.59 K/UL — SIGNIFICANT CHANGE UP (ref 1–3.3)
LYMPHOCYTES # BLD AUTO: 14.1 % — SIGNIFICANT CHANGE UP (ref 13–44)
LYMPHOCYTES # BLD AUTO: 14.1 % — SIGNIFICANT CHANGE UP (ref 13–44)
MAGNESIUM SERPL-MCNC: 2 MG/DL — SIGNIFICANT CHANGE UP (ref 1.6–2.6)
MAGNESIUM SERPL-MCNC: 2 MG/DL — SIGNIFICANT CHANGE UP (ref 1.6–2.6)
MCHC RBC-ENTMCNC: 24.5 PG — LOW (ref 27–34)
MCHC RBC-ENTMCNC: 24.5 PG — LOW (ref 27–34)
MCHC RBC-ENTMCNC: 29.7 GM/DL — LOW (ref 32–36)
MCHC RBC-ENTMCNC: 29.7 GM/DL — LOW (ref 32–36)
MCV RBC AUTO: 82.7 FL — SIGNIFICANT CHANGE UP (ref 80–100)
MCV RBC AUTO: 82.7 FL — SIGNIFICANT CHANGE UP (ref 80–100)
METHOD TYPE: SIGNIFICANT CHANGE UP
METHOD TYPE: SIGNIFICANT CHANGE UP
MONOCYTES # BLD AUTO: 1.21 K/UL — HIGH (ref 0–0.9)
MONOCYTES # BLD AUTO: 1.21 K/UL — HIGH (ref 0–0.9)
MONOCYTES NFR BLD AUTO: 10.7 % — SIGNIFICANT CHANGE UP (ref 2–14)
MONOCYTES NFR BLD AUTO: 10.7 % — SIGNIFICANT CHANGE UP (ref 2–14)
NEUTROPHILS # BLD AUTO: 8.27 K/UL — HIGH (ref 1.8–7.4)
NEUTROPHILS # BLD AUTO: 8.27 K/UL — HIGH (ref 1.8–7.4)
NEUTROPHILS NFR BLD AUTO: 73.3 % — SIGNIFICANT CHANGE UP (ref 43–77)
NEUTROPHILS NFR BLD AUTO: 73.3 % — SIGNIFICANT CHANGE UP (ref 43–77)
NRBC # BLD: 0 /100 WBCS — SIGNIFICANT CHANGE UP (ref 0–0)
NRBC # BLD: 0 /100 WBCS — SIGNIFICANT CHANGE UP (ref 0–0)
PHOSPHATE SERPL-MCNC: 3.7 MG/DL — SIGNIFICANT CHANGE UP (ref 2.5–4.5)
PHOSPHATE SERPL-MCNC: 3.7 MG/DL — SIGNIFICANT CHANGE UP (ref 2.5–4.5)
PLATELET # BLD AUTO: 459 K/UL — HIGH (ref 150–400)
PLATELET # BLD AUTO: 459 K/UL — HIGH (ref 150–400)
POTASSIUM SERPL-MCNC: 4.5 MMOL/L — SIGNIFICANT CHANGE UP (ref 3.5–5.3)
POTASSIUM SERPL-MCNC: 4.5 MMOL/L — SIGNIFICANT CHANGE UP (ref 3.5–5.3)
POTASSIUM SERPL-SCNC: 4.5 MMOL/L — SIGNIFICANT CHANGE UP (ref 3.5–5.3)
POTASSIUM SERPL-SCNC: 4.5 MMOL/L — SIGNIFICANT CHANGE UP (ref 3.5–5.3)
RBC # BLD: 4.4 M/UL — SIGNIFICANT CHANGE UP (ref 3.8–5.2)
RBC # BLD: 4.4 M/UL — SIGNIFICANT CHANGE UP (ref 3.8–5.2)
RBC # FLD: 15.5 % — HIGH (ref 10.3–14.5)
RBC # FLD: 15.5 % — HIGH (ref 10.3–14.5)
SODIUM SERPL-SCNC: 137 MMOL/L — SIGNIFICANT CHANGE UP (ref 135–145)
SODIUM SERPL-SCNC: 137 MMOL/L — SIGNIFICANT CHANGE UP (ref 135–145)
SPECIMEN SOURCE: SIGNIFICANT CHANGE UP
SPECIMEN SOURCE: SIGNIFICANT CHANGE UP
WBC # BLD: 11.29 K/UL — HIGH (ref 3.8–10.5)
WBC # BLD: 11.29 K/UL — HIGH (ref 3.8–10.5)
WBC # FLD AUTO: 11.29 K/UL — HIGH (ref 3.8–10.5)
WBC # FLD AUTO: 11.29 K/UL — HIGH (ref 3.8–10.5)

## 2023-12-10 PROCEDURE — 73502 X-RAY EXAM HIP UNI 2-3 VIEWS: CPT | Mod: 26,LT

## 2023-12-10 PROCEDURE — 99223 1ST HOSP IP/OBS HIGH 75: CPT

## 2023-12-10 PROCEDURE — 73701 CT LOWER EXTREMITY W/DYE: CPT | Mod: 26,LT

## 2023-12-10 RX ORDER — MORPHINE SULFATE 50 MG/1
1 CAPSULE, EXTENDED RELEASE ORAL EVERY 6 HOURS
Refills: 0 | Status: DISCONTINUED | OUTPATIENT
Start: 2023-12-10 | End: 2023-12-15

## 2023-12-10 RX ORDER — ATORVASTATIN CALCIUM 80 MG/1
10 TABLET, FILM COATED ORAL AT BEDTIME
Refills: 0 | Status: DISCONTINUED | OUTPATIENT
Start: 2023-12-10 | End: 2023-12-15

## 2023-12-10 RX ORDER — HEPARIN SODIUM 5000 [USP'U]/ML
5000 INJECTION INTRAVENOUS; SUBCUTANEOUS EVERY 8 HOURS
Refills: 0 | Status: DISCONTINUED | OUTPATIENT
Start: 2023-12-10 | End: 2023-12-13

## 2023-12-10 RX ORDER — OXYCODONE HYDROCHLORIDE 5 MG/1
5 TABLET ORAL EVERY 6 HOURS
Refills: 0 | Status: DISCONTINUED | OUTPATIENT
Start: 2023-12-10 | End: 2023-12-15

## 2023-12-10 RX ORDER — ACETAMINOPHEN 500 MG
650 TABLET ORAL EVERY 6 HOURS
Refills: 0 | Status: DISCONTINUED | OUTPATIENT
Start: 2023-12-10 | End: 2023-12-15

## 2023-12-10 RX ORDER — CEFEPIME 1 G/1
2000 INJECTION, POWDER, FOR SOLUTION INTRAMUSCULAR; INTRAVENOUS EVERY 8 HOURS
Refills: 0 | Status: DISCONTINUED | OUTPATIENT
Start: 2023-12-10 | End: 2023-12-11

## 2023-12-10 RX ORDER — OXYCODONE HYDROCHLORIDE 5 MG/1
5 TABLET ORAL EVERY 8 HOURS
Refills: 0 | Status: DISCONTINUED | OUTPATIENT
Start: 2023-12-10 | End: 2023-12-10

## 2023-12-10 RX ORDER — LEVOTHYROXINE SODIUM 125 MCG
25 TABLET ORAL DAILY
Refills: 0 | Status: DISCONTINUED | OUTPATIENT
Start: 2023-12-10 | End: 2023-12-15

## 2023-12-10 RX ORDER — CEFEPIME 1 G/1
INJECTION, POWDER, FOR SOLUTION INTRAMUSCULAR; INTRAVENOUS
Refills: 0 | Status: DISCONTINUED | OUTPATIENT
Start: 2023-12-10 | End: 2023-12-11

## 2023-12-10 RX ORDER — APIXABAN 2.5 MG/1
5 TABLET, FILM COATED ORAL EVERY 12 HOURS
Refills: 0 | Status: DISCONTINUED | OUTPATIENT
Start: 2023-12-10 | End: 2023-12-10

## 2023-12-10 RX ORDER — FUROSEMIDE 40 MG
20 TABLET ORAL DAILY
Refills: 0 | Status: DISCONTINUED | OUTPATIENT
Start: 2023-12-10 | End: 2023-12-15

## 2023-12-10 RX ORDER — DULOXETINE HYDROCHLORIDE 30 MG/1
60 CAPSULE, DELAYED RELEASE ORAL DAILY
Refills: 0 | Status: DISCONTINUED | OUTPATIENT
Start: 2023-12-10 | End: 2023-12-15

## 2023-12-10 RX ORDER — CEFEPIME 1 G/1
2000 INJECTION, POWDER, FOR SOLUTION INTRAMUSCULAR; INTRAVENOUS ONCE
Refills: 0 | Status: COMPLETED | OUTPATIENT
Start: 2023-12-10 | End: 2023-12-10

## 2023-12-10 RX ORDER — CHLORHEXIDINE GLUCONATE 213 G/1000ML
1 SOLUTION TOPICAL DAILY
Refills: 0 | Status: DISCONTINUED | OUTPATIENT
Start: 2023-12-10 | End: 2023-12-15

## 2023-12-10 RX ORDER — OXYCODONE HYDROCHLORIDE 5 MG/1
5 TABLET ORAL ONCE
Refills: 0 | Status: DISCONTINUED | OUTPATIENT
Start: 2023-12-10 | End: 2023-12-10

## 2023-12-10 RX ORDER — METOPROLOL TARTRATE 50 MG
12.5 TABLET ORAL
Refills: 0 | Status: DISCONTINUED | OUTPATIENT
Start: 2023-12-10 | End: 2023-12-15

## 2023-12-10 RX ORDER — VANCOMYCIN HCL 1 G
2000 VIAL (EA) INTRAVENOUS EVERY 12 HOURS
Refills: 0 | Status: DISCONTINUED | OUTPATIENT
Start: 2023-12-10 | End: 2023-12-12

## 2023-12-10 RX ORDER — VANCOMYCIN HCL 1 G
2000 VIAL (EA) INTRAVENOUS EVERY 12 HOURS
Refills: 0 | Status: DISCONTINUED | OUTPATIENT
Start: 2023-12-10 | End: 2023-12-10

## 2023-12-10 RX ADMIN — Medication 12.5 MILLIGRAM(S): at 17:30

## 2023-12-10 RX ADMIN — MORPHINE SULFATE 1 MILLIGRAM(S): 50 CAPSULE, EXTENDED RELEASE ORAL at 17:29

## 2023-12-10 RX ADMIN — Medication 25 MICROGRAM(S): at 05:56

## 2023-12-10 RX ADMIN — OXYCODONE HYDROCHLORIDE 5 MILLIGRAM(S): 5 TABLET ORAL at 05:42

## 2023-12-10 RX ADMIN — OXYCODONE HYDROCHLORIDE 5 MILLIGRAM(S): 5 TABLET ORAL at 11:44

## 2023-12-10 RX ADMIN — Medication 12.5 MILLIGRAM(S): at 05:56

## 2023-12-10 RX ADMIN — CEFEPIME 100 MILLIGRAM(S): 1 INJECTION, POWDER, FOR SOLUTION INTRAMUSCULAR; INTRAVENOUS at 23:24

## 2023-12-10 RX ADMIN — OXYCODONE HYDROCHLORIDE 5 MILLIGRAM(S): 5 TABLET ORAL at 22:30

## 2023-12-10 RX ADMIN — CEFEPIME 100 MILLIGRAM(S): 1 INJECTION, POWDER, FOR SOLUTION INTRAMUSCULAR; INTRAVENOUS at 17:30

## 2023-12-10 RX ADMIN — APIXABAN 5 MILLIGRAM(S): 2.5 TABLET, FILM COATED ORAL at 05:56

## 2023-12-10 RX ADMIN — HEPARIN SODIUM 5000 UNIT(S): 5000 INJECTION INTRAVENOUS; SUBCUTANEOUS at 21:31

## 2023-12-10 RX ADMIN — ATORVASTATIN CALCIUM 10 MILLIGRAM(S): 80 TABLET, FILM COATED ORAL at 21:31

## 2023-12-10 RX ADMIN — Medication 650 MILLIGRAM(S): at 23:23

## 2023-12-10 RX ADMIN — Medication 250 MILLIGRAM(S): at 20:23

## 2023-12-10 RX ADMIN — Medication 20 MILLIGRAM(S): at 05:56

## 2023-12-10 RX ADMIN — OXYCODONE HYDROCHLORIDE 5 MILLIGRAM(S): 5 TABLET ORAL at 15:50

## 2023-12-10 RX ADMIN — OXYCODONE HYDROCHLORIDE 5 MILLIGRAM(S): 5 TABLET ORAL at 21:31

## 2023-12-10 RX ADMIN — OXYCODONE HYDROCHLORIDE 5 MILLIGRAM(S): 5 TABLET ORAL at 06:42

## 2023-12-10 RX ADMIN — DULOXETINE HYDROCHLORIDE 60 MILLIGRAM(S): 30 CAPSULE, DELAYED RELEASE ORAL at 11:26

## 2023-12-10 NOTE — PHYSICAL THERAPY INITIAL EVALUATION ADULT - IMPAIRMENTS FOUND, PT EVAL
aerobic capacity/endurance/anthropometric characteristics/gait, locomotion, and balance/integumentary integrity/joint integrity and mobility/muscle strength/ROM

## 2023-12-10 NOTE — H&P ADULT - ASSESSMENT
73F morbidly obese, chronic b/l lymphedema, HTN, HLD, hypothyroidism, AFib, ambulates w/ walker at bl, admit 6/2021 for septic L knee s/p washout c/c/b S aureus bacteremia, now p/w L knee pain and resultant inability to bear wt, meeting SIRS criteria and exam w/ severe pain to L knee flexion c/f septic arthritis

## 2023-12-10 NOTE — PATIENT PROFILE ADULT - VISION (WITH CORRECTIVE LENSES IF THE PATIENT USUALLY WEARS THEM):
Deanna, as I didn't see Tipadrianjillian, and Raeann ordered a CT scan, why does she wish to see specialists before CT is done?  Should get CT and see results.  It also appears she gets refills from Dr. Decker.  If she is established, does she need another referral to see him?  Has she seen Dr. Batista in the past?  Is this for her pain?  Again, a CT scan would be helpful, I think, in coming to a diagnosis.  mitch   Normal vision: sees adequately in most situations; can see medication labels, newsprint

## 2023-12-10 NOTE — H&P ADULT - PROBLEM SELECTOR PLAN 7
BP elevated in ED  c/f sepsis as above  - hold home amlodipine, c/w BB (reduced dose as below) and lasix, reintroduce if remains HD stable BP elevated in ED  meets SIRS as above  - hold home amlodipine, c/w BB (reduced dose as below) and lasix, reintroduce if remains HD stable

## 2023-12-10 NOTE — H&P ADULT - HISTORY OF PRESENT ILLNESS
73F morbidly obese, chronic b/l lymphedema, HTN, HLD, hypothyroidism, AFib, ambulates w/ walker at bl, admit 6/2021 for septic L knee s/p washout c/c/b S aureus bacteremia, now p/w L knee pain and resultant inability to bear wt. Pt relays [facility] when 11/24 experienced fever tmax 102F accompanied by diffuse myalgias which abated after 12hrs. Was evaluated by home visit Dr. Bender 11/28 who obtained labs, the results of which were relayed to her 12/2 as revealing of a "bad infection" though pt unclear as to source, was prescribed levaquin for this. Subsequently, 1 wk ago, developed L knee pain precluding ability to bear wt despite walker use, prompting ED presentation. Of note, she relays appearance of b/l LE skin is at bl other than posterior L knee skin tear which she states bled earlier in day. Otherwise she denies complaints.     VS significant for tachycardia HR 110s, hypertensive BP 120s-160s/80s-100    Labs c/f leukocytosis 12.45, normocytic (MCV 84.1) anemia H/H 10.8/37.1, thrombocytosis 464; abn coags (PT 16.8, INR 1.55); BCx x 2 in lab     XR L knee: redemonstrated diffuse osteophytes involving the femoral condyles and tibial plateau. Redemonstrated chronic dislocation of the patella with large osteophytes and remodeling.     [facility] duplex LE vein scan b/l: small R Hooks's cyst, limited study though no e/o DVT in visualized segments     Received morphine 4mg IV x 2, vanc 1g IV, zosyn 3.375g IV

## 2023-12-10 NOTE — PHYSICAL THERAPY INITIAL EVALUATION ADULT - ADDITIONAL COMMENTS
Pt lives in pvt house with 5+6steps to enter. Pt primarily was a house distance ambulator in between rooms. Uses RW to amb. Sleeps in recliner. Assist for all ADL's. Has a  until 12/20 who will be leaving country. Will be replaced by a HHA 4hrs/day x 6days/wk starting on 12/18. Pt has medical providers who visit her at home. Uses fire dept for emergencies if ever she needs to leave the house.

## 2023-12-10 NOTE — PATIENT PROFILE ADULT - IS PATIENT POST-MENOPAUSAL?
yes Slit Excision Additional Text (Leave Blank If You Do Not Want): A linear line was drawn on the skin overlying the lesion. An incision was made slowly until the lesion was visualized.  Once visualized, the lesion was removed with blunt dissection.

## 2023-12-10 NOTE — H&P ADULT - NSICDXPASTMEDICALHX_GEN_ALL_CORE_FT
PAST MEDICAL HISTORY:  Chronic atrial fibrillation     HTN (hypertension)     Hypothyroidism     Lymphedema     Morbid obesity     Septic arthritis of knee, left

## 2023-12-10 NOTE — PATIENT PROFILE ADULT - HEALTH LITERACY
Suyapa Low is a 30 y.o. female here for a non-provider visit for PPD placement -- Step 1 of 1    Reason for PPD:  work requirement    1. TB evaluation questionnaire completed by patient? Yes      -  If any answers marked yes did you contact a provider prior to placing? Not Indicated  2.  Patient notified to return to clinic for reading on: 03/17/2022 after 10:58 am   Or 03/18/2022 before 10:58 am   3.  PPD Placement documentation completed on TB evaluation questionnaire? Yes  4.  Location of TB evaluation questionnaire filed: [place]  
no

## 2023-12-10 NOTE — H&P ADULT - PROBLEM SELECTOR PLAN 10
ED EKG unavailable, irregular rhythm on exam  home toprol 50mg, eliquis 5mg BID  - order EKG for f/u  - c/w eliquis and BB as lopressor 12.5mg BID given sepsis, can uptitrate if remains HD stable ED EKG unavailable, irregular rhythm on exam  home toprol 50mg, eliquis 5mg BID  - order EKG for f/u  - c/w eliquis and BB as lopressor 12.5mg BID given SIRS, can uptitrate if remains HD stable

## 2023-12-10 NOTE — CONSULT NOTE ADULT - ASSESSMENT
Assessment: 73y Female with morbid obesity and h/o L knee septic arthritis requiring washout presented with 2 weeks of knee pain and fever 2 weeks ago. CT demonstrates finding c/w cellulitis with small knee joint effusion. Patient last took Eliquis for a fib 12/9.    Plan: after discussion with primary team and orthopaedics, IR to perform image guided L knee arthrocentesis Monday, 12/11  -Please place order for IR Procedure, approving attending Dr. Allred  -patient does not need to be NPO for procedure  -c/w hold A/C  -AM CBC, BMP, and coags  -IR Pre-procedure note  -plan reviewed with IR attending Dr. Allred  -discussed with primary team    Reji Ackerman MD  PGY-VI, Interventional Radiology Integrated Resident    -Available on [employer] TEAMS  -Emergent issues: [facility]-p.[phone]; [facility]-p.[phone] ([phone])  -Non-emergent consults: Please place a [place] order "IR Consult" with an appropriate callback number  -Scheduling questions: [facility]: [phone]; [facility]: [phone]  -Clinic/Outpatient booking: [facility]: [phone]; [facility]: [phone] Assessment: 73y Female with morbid obesity and h/o L knee septic arthritis requiring washout presented with 2 weeks of knee pain and fever 2 weeks ago. CT demonstrates finding c/w cellulitis with small knee joint effusion. Patient last took Eliquis for a fib 12/9.    Plan: after discussion with primary team and orthopaedics, IR to perform image guided L knee arthrocentesis Monday, 12/11  -Please place order for IR Procedure, approving attending Dr. Allred  -patient does not need to be NPO for procedure  -c/w hold A/C  -AM CBC, BMP, and coags  -IR Pre-procedure note  -plan reviewed with IR attending Dr. Allred  -discussed with primary team    Reji Ackerman MD  PGY-VI, Interventional Radiology Integrated Resident    -Available on [employer] TEAMS  -Emergent issues: [facility]-p.[phone]; [facility]-p.[phone] ([phone])  -Non-emergent consults: Please place a [place] order "IR Consult" with an appropriate callback number  -Scheduling questions: [facility]: [phone]; [facility]: [phone]  -Clinic/Outpatient booking: [facility]: [phone]; [facility]: [phone]

## 2023-12-10 NOTE — H&P ADULT - TIME BILLING
- Ordering, reviewing, and interpreting labs, testing, and imaging.  - Independently obtaining a review of systems and performing a physical exam  - Reviewing consultant documentation/recommendations in addition to discussing plan of care with consultants.  - Counselling and educating patient regarding interpretation of aforementioned items and plan of care.

## 2023-12-10 NOTE — PHYSICAL THERAPY INITIAL EVALUATION ADULT - BALANCE TRAINING, PT EVAL
Pt to improve balance in sitting/standing by 1/rd3rdgrdrrdarddrderd x 4 wks Pt to improve balance in sitting/standing by 1/st1stgstrstastdstest x 4 wks

## 2023-12-10 NOTE — PATIENT PROFILE ADULT - LEGAL HELP
Problem: PAIN - ADULT  Goal: Verbalizes/displays adequate comfort level or baseline comfort level  Description: Interventions:  - Encourage patient to monitor pain and request assistance  - Assess pain using appropriate pain scale  - Administer analgesics based on type and severity of pain and evaluate response  - Implement non-pharmacological measures as appropriate and evaluate response  - Consider cultural and social influences on pain and pain management  - Notify physician/advanced practitioner if interventions unsuccessful or patient reports new pain  Outcome: Progressing     Problem: INFECTION - ADULT  Goal: Absence or prevention of progression during hospitalization  Description: INTERVENTIONS:  - Assess and monitor for signs and symptoms of infection  - Monitor lab/diagnostic results  - Monitor all insertion sites, i.e. indwelling lines, tubes, and drains  - Monitor endotracheal if appropriate and nasal secretions for changes in amount and color  - [place] appropriate cooling/warming therapies per order  - Administer medications as ordered  - Instruct and encourage patient and family to use good hand hygiene technique  - Identify and instruct in appropriate isolation precautions for identified infection/condition  Outcome: Progressing  Goal: Absence of fever/infection during neutropenic period  Description: INTERVENTIONS:  - Monitor WBC    Outcome: Progressing     Problem: SAFETY ADULT  Goal: Patient will remain free of falls  Description: INTERVENTIONS:  - Assess patient frequently for physical needs  -  Identify cognitive and physical deficits and behaviors that affect risk of falls.   -  [place] fall precautions as indicated by assessment.  - Educate patient/family on patient safety including physical limitations  - Instruct patient to call for assistance with activity based on assessment  - Modify environment to reduce risk of injury  - Consider OT/PT consult to assist with strengthening/mobility  Outcome: Progressing  Goal: Maintain or return to baseline ADL function  Description: INTERVENTIONS:  -  Assess patient's ability to carry out ADLs; assess patient's baseline for ADL function and identify physical deficits which impact ability to perform ADLs (bathing, care of mouth/teeth, toileting, grooming, dressing, etc.)  - Assess/evaluate cause of self-care deficits   - Assess range of motion  - Assess patient's mobility; develop plan if impaired  - Assess patient's need for assistive devices and provide as appropriate  - Encourage maximum independence but intervene and supervise when necessary  - Involve family in performance of ADLs  - Assess for home care needs following discharge   - Consider OT consult to assist with ADL evaluation and planning for discharge  - Provide patient education as appropriate  Outcome: Progressing  Goal: Maintain or return mobility status to optimal level  Description: INTERVENTIONS:  - Assess patient's baseline mobility status (ambulation, transfers, stairs, etc.)    - Identify cognitive and physical deficits and behaviors that affect mobility  - Identify mobility aids required to assist with transfers and/or ambulation (gait belt, sit-to-stand, lift, walker, cane, etc.)  - [place] fall precautions as indicated by assessment  - Record patient progress and toleration of activity level on Mobility SBAR; progress patient to next Phase/Stage  - Instruct patient to call for assistance with activity based on assessment  - Consider rehabilitation consult to assist with strengthening/weightbearing, etc.  Outcome: Progressing     Problem: DISCHARGE PLANNING  Goal: Discharge to home or other facility with appropriate resources  Description: INTERVENTIONS:  - Identify barriers to discharge w/patient and caregiver  - Arrange for needed discharge resources and transportation as appropriate  - Identify discharge learning needs (meds, wound care, etc.)  - Arrange for interpretive services to assist at discharge as needed  - Refer to Case Management Department for coordinating discharge planning if the patient needs post-hospital services based on physician/advanced practitioner order or complex needs related to functional status, cognitive ability, or social support system  Outcome: Progressing     Problem: POSTPARTUM  Goal: Experiences normal postpartum course  Description: INTERVENTIONS:  - Monitor maternal vital signs  - Assess uterine involution and lochia  Outcome: Progressing  Goal: Appropriate maternal -  bonding  Description: INTERVENTIONS:  - Identify family support  - Assess for appropriate maternal/infant bonding   -Encourage maternal/infant bonding opportunities  - Referral to  or  as needed  Outcome: Progressing  Goal: Establishment of infant feeding pattern  Description: INTERVENTIONS:  - Assess breast/bottle feeding  - Refer to lactation as needed  Outcome: Progressing  Goal: Incision(s), wounds(s) or drain site(s) healing without S/S of infection  Description: INTERVENTIONS  - Assess and document risk factors for skin impairment   - Assess and document dressing, incision, wound bed, drain sites and surrounding tissue  - Consider nutrition services referral as needed  - Oral mucous membranes remain intact  - Provide patient/ family education  Outcome: Progressing     Problem: Potential for Falls  Goal: Patient will remain free of falls  Description: INTERVENTIONS:  - Assess patient frequently for physical needs  -  Identify cognitive and physical deficits and behaviors that affect risk of falls.   -  [place] fall precautions as indicated by assessment.  - Educate patient/family on patient safety including physical limitations  - Instruct patient to call for assistance with activity based on assessment  - Modify environment to reduce risk of injury  - Consider OT/PT consult to assist with strengthening/mobility  Outcome: Progressing no

## 2023-12-10 NOTE — CONSULT NOTE ADULT - ATTENDING COMMENTS
Pt seen and examined. Severe knee pain for approx 2 weeks. Given antibiotics by PMD. Will require IR guided knee aspiration, POssible drain placement.

## 2023-12-10 NOTE — CHART NOTE - NSCHARTNOTEFT_GEN_A_CORE
Confidential Drug Utilization Report  Search Terms: Shantelle Jaramillo, 1950Search Date: 12/10/2023 05:29:53 AM  Searching on behalf of: 0541 - [facility]  The Drug Utilization Report below displays all of the controlled substance prescriptions, if any, that your patient has filled in the last twelve months. The information displayed on this report is compiled from pharmacy submissions to the Department, and accurately reflects the information as submitted by the pharmacies.    This report was requested by: Kaylee Garcia | Reference #: [id number]    Practitioner Count: 1  Pharmacy Count: 1  Current Opioid Prescriptions: 1  Current Benzodiazepine Prescriptions: 0  Current Stimulant Prescriptions: 0      Patient Demographic Information (PDI)       PDI	First Name	Last Name	Birth Date	Gender	Street Address	[facility]	Zip Code  [place]	1950	Female	[address] [facility]	[address]    Prescription Information      PDI Filter:    PDI	Current Rx	Drug Type	Rx Written	Rx Dispensed	Drug	Quantity	Days Supply	Prescriber Name	Prescriber ABILIO #	Payment Method  A	Y	O	11/28/2023	11/30/2023	oxycodone hcl (ir) 5 mg tablet	90	30	Topher Bender MD	[id number]	Insurance  Dispenser [facility]  A	N	O	11/01/2023	11/02/2023	oxycodone hcl (ir) 5 mg tablet	90	30	Topher Bender MD	[id number]	Insurance  Dispenser [facility]  A	N	O	10/04/2023	10/05/2023	oxycodone hcl (ir) 5 mg tablet	90	30	Topher Bender MD	[id number]	Insurance  Dispenser [facility]  A	N	O	09/06/2023	09/08/2023	oxycodone hcl (ir) 5 mg tablet	90	30	Topher Bender MD	[id number]	Insurance  Dispenser [facility]  A	N	O	08/09/2023	08/11/2023	oxycodone hcl (ir) 5 mg tablet	90	30	Topher Bender MD	[id number]	Insurance  Dispenser [facility]  A	N	O	07/12/2023	07/13/2023	oxycodone hcl (ir) 5 mg tablet	90	30	Topher Bender MD	[id number]	Insurance  Dispenser [facility]  A	N	O	06/14/2023	06/15/2023	oxycodone hcl (ir) 5 mg tablet	90	30	Topher Bender MD	[id number]	Insurance  Dispenser [facility]  A	N	O	05/17/2023	05/18/2023	oxycodone hcl (ir) 5 mg tablet	90	30	Topher Bender MD	[id number]	Insurance  Dispenser [facility]  A	N	O	04/19/2023	04/19/2023	oxycodone hcl (ir) 5 mg tablet	90	30	Topher Bender MD	[id number]	Insurance  Dispenser [facility]  A	N	O	03/22/2023	03/22/2023	oxycodone hcl (ir) 5 mg tablet	90	30	Topher Bender MD	[id number]	Insurance  Dispenser [facility]  A	N	O	02/22/2023	02/23/2023	oxycodone hcl (ir) 5 mg tablet	90	30	Topher Bender MD	[id number]	Insurance  Dispenser [facility]  A	N	O	01/23/2023	01/24/2023	oxycodone hcl (ir) 5 mg tablet	90	30	Topher Bender MD	[id number]	Insurance  Dispenser [facility]  A	N	O	12/22/2022	12/24/2022	oxycodone hcl (ir) 5 mg tablet	90	30	Topher Bender MD	[id number]	Insurance  Dispenser [facility] Confidential Drug Utilization Report  Search Terms: Shantelle Jaramillo, 1950Search Date: 12/10/2023 05:29:53 AM  Searching on behalf of: 0541 - [facility]  The Drug Utilization Report below displays all of the controlled substance prescriptions, if any, that your patient has filled in the last twelve months. The information displayed on this report is compiled from pharmacy submissions to the Department, and accurately reflects the information as submitted by the pharmacies.    This report was requested by: Kaylee Garcia | Reference #: [id number]    Practitioner Count: 1  Pharmacy Count: 1  Current Opioid Prescriptions: 1  Current Benzodiazepine Prescriptions: 0  Current Stimulant Prescriptions: 0      Patient Demographic Information (PDI)       PDI	First Name	Last Name	Birth Date	Gender	Street Address	[facility]	Zip Code  [place]	1950	Female	[address] [facility]	[address]    Prescription Information      PDI Filter:    PDI	Current Rx	Drug Type	Rx Written	Rx Dispensed	Drug	Quantity	Days Supply	Prescriber Name	Prescriber ABILIO #	Payment Method  A	Y	O	11/28/2023	11/30/2023	oxycodone hcl (ir) 5 mg tablet	90	30	Topher Bender MD	[id number]	Insurance  Dispenser [facility]  A	N	O	11/01/2023	11/02/2023	oxycodone hcl (ir) 5 mg tablet	90	30	Topher Bender MD	[id number]	Insurance  Dispenser [facility]  A	N	O	10/04/2023	10/05/2023	oxycodone hcl (ir) 5 mg tablet	90	30	Topher Bender MD	[id number]	Insurance  Dispenser [facility]  A	N	O	09/06/2023	09/08/2023	oxycodone hcl (ir) 5 mg tablet	90	30	Topher Bender MD	[id number]	Insurance  Dispenser [facility]  A	N	O	08/09/2023	08/11/2023	oxycodone hcl (ir) 5 mg tablet	90	30	Topher Bender MD	[id number]	Insurance  Dispenser [facility]  A	N	O	07/12/2023	07/13/2023	oxycodone hcl (ir) 5 mg tablet	90	30	Topher Bender MD	[id number]	Insurance  Dispenser [facility]  A	N	O	06/14/2023	06/15/2023	oxycodone hcl (ir) 5 mg tablet	90	30	Topher Bender MD	[id number]	Insurance  Dispenser [facility]  A	N	O	05/17/2023	05/18/2023	oxycodone hcl (ir) 5 mg tablet	90	30	Topher Bender MD	[id number]	Insurance  Dispenser [facility]  A	N	O	04/19/2023	04/19/2023	oxycodone hcl (ir) 5 mg tablet	90	30	Topher Bender MD	[id number]	Insurance  Dispenser [facility]  A	N	O	03/22/2023	03/22/2023	oxycodone hcl (ir) 5 mg tablet	90	30	Topher Bender MD	[id number]	Insurance  Dispenser [facility]  A	N	O	02/22/2023	02/23/2023	oxycodone hcl (ir) 5 mg tablet	90	30	Topher Bender MD	[id number]	Insurance  Dispenser [facility]  A	N	O	01/23/2023	01/24/2023	oxycodone hcl (ir) 5 mg tablet	90	30	Topher Bender MD	[id number]	Insurance  Dispenser [facility]  A	N	O	12/22/2022	12/24/2022	oxycodone hcl (ir) 5 mg tablet	90	30	Topher Bender MD	[id number]	Insurance  Dispenser [facility]

## 2023-12-10 NOTE — PHYSICAL THERAPY INITIAL EVALUATION ADULT - PERTINENT HX OF CURRENT PROBLEM, REHAB EVAL
Pt is a 73F adm to [facility] on 12/9/23 with h/o morbid obesity, chronic b/l lymphedema, HTN, HLD, hypothyroidism, AFib, ambulates w/ walker at bl, admit 6/2021 for septic L knee s/p washout c/c/b S aureus bacteremia, now p/w L knee pain and resultant inability to bear wt. Pt relays bl health when 11/24 experienced fever tmax 102F accompanied by diffuse myalgias which abated after 12hrs. Was evaluated by home visit Dr. Bender 11/28 who obtained labs, the results of which were relayed to her 12/2 as revealing of a "bad infection" though pt unclear as to source, was prescribed levaquin for this. Subsequently, 1 wk ago, developed L knee pain precluding ability to bear wt despite walker use, prompting ED presentation. Of note, she relays appearance of b/l LE skin is at bl other than posterior L knee skin tear which she states bled earlier in day. Otherwise she denies complaints.   XR L knee: redemonstrated diffuse osteophytes involving the femoral condyles and tibial plateau. Redemonstrated chronic dislocation of the patella with large osteophytes and remodeling. VA duplex LE vein scan b/l: small R Hooks's cyst, limited study though no e/o DVT in visualized segment Pt is a 73F adm to [facility] on 12/9/23 with h/o morbid obesity, chronic b/l lymphedema, HTN, HLD, hypothyroidism, AFib, ambulates w/ walker at bl, admit 6/2021 for septic L knee s/p washout c/c/b S aureus bacteremia, now p/w L knee pain and resultant inability to bear wt. Pt relays bl health when 11/24 experienced fever tmax 102F accompanied by diffuse myalgias which abated after 12hrs. Was evaluated by home visit Dr. Bender 11/28 who obtained labs, the results of which were relayed to her 12/2 as revealing of a "bad infection" though pt unclear as to source, was prescribed levaquin for this. Subsequently, 1 wk ago, developed L knee pain precluding ability to bear wt despite walker use, prompting ED presentation. Of note, she relays appearance of b/l LE skin is at bl other than posterior L knee skin tear which she states bled earlier in day. Otherwise she denies complaints.   XR L knee: redemonstrated diffuse osteophytes involving the femoral condyles and tibial plateau. Redemonstrated chronic dislocation of the patella with large osteophytes and remodeling. VA duplex LE vein scan b/l: small R Hooks's cyst, limited study though no e/o DVT in visualized segment Pt is a 73F adm to [facility] on 12/9/23 with h/o morbid obesity, chronic b/l lymphedema, HTN, HLD, hypothyroidism, AFib, ambulates w/ walker at bl, admit 6/2021 for septic L knee s/p washout c/c/b S aureus bacteremia, now p/w L knee pain and resultant inability to bear wt. Pt relays bl health when 11/24 experienced fever tmax 102F accompanied by diffuse myalgias which abated after 12hrs. Was evaluated by home visit Dr. Bender 11/28 who obtained labs, the results of which were relayed to her 12/2 as revealing of a "bad infection" though pt unclear as to source, was prescribed levaquin for this. Subsequently, 1 wk ago, developed L knee pain precluding ability to bear wt despite walker use, prompting ED presentation. Of note, she relays appearance of b/l LE skin is at bl other than posterior L knee skin tear which she states bled earlier in day. Otherwise she denies complaints.   XR L knee: redemonstrated diffuse osteophytes involving the femoral condyles and tibial plateau. Redemonstrated chronic dislocation of the patella with large osteophytes and remodeling. VA duplex LE vein scan b/l: small R Hooks's cyst, limited study though no e/o DVT in visualized segment  Ortho Consult: WBAT L knee; Recommend IR aspiration of L knee w possible drain placement. Ortho will cont, f/u BCx, ESR/CRP. NPO after IR aspiration until cultures/gram stain result Pt is a 73F adm to [facility] on 12/9/23 with h/o morbid obesity, chronic b/l lymphedema, HTN, HLD, hypothyroidism, AFib, ambulates w/ walker at bl, admit 6/2021 for septic L knee s/p washout c/c/b S aureus bacteremia, now p/w L knee pain and resultant inability to bear wt. Pt relays bl health when 11/24 experienced fever tmax 102F accompanied by diffuse myalgias which abated after 12hrs. Was evaluated by home visit Dr. Bender 11/28 who obtained labs, the results of which were relayed to her 12/2 as revealing of a "bad infection" though pt unclear as to source, was prescribed levaquin for this. Subsequently, 1 wk ago, developed L knee pain precluding ability to bear wt despite walker use, prompting ED presentation. Of note, she relays appearance of b/l LE skin is at bl other than posterior L knee skin tear which she states bled earlier in day. Otherwise she denies complaints.   XR L knee: redemonstrated diffuse osteophytes involving the femoral condyles and tibial plateau. Redemonstrated chronic dislocation of the patella with large osteophytes and remodeling. VA duplex LE vein scan b/l: small R Hooks's cyst, limited study though no e/o DVT in visualized segment  Ortho Consult: WBAT L knee; Recommend IR aspiration of L knee w possible drain placement. Ortho will cont, f/u BCx, ESR/CRP. NPO after IR aspiration until cultures/gram stain result

## 2023-12-10 NOTE — H&P ADULT - NSHPPHYSICALEXAM_GEN_ALL_CORE
PHYSICAL EXAM:  GENERAL: Morbidly-obese, in NAD, well-groomed, pleasant to interview  HEAD: Atraumatic, Normocephalic  EYES: PERRL, conjunctiva and sclera clear  ENMT: No tonsillar erythema, exudates, or enlargement; Moist mucous membranes  NECK: Supple, difficult to assess JVP 2/2 habitus  NERVOUS SYSTEM: Alert & Oriented X4, Good concentration; Moving extremities  CHEST/LUNG: Clear to auscultation bilaterally; No rales, rhonchi, wheezing, or rubs  HEART: Regular rate and +irregular rhythm; No murmurs, rubs, or gallops  ABDOMEN: Soft, Nontender, Nondistended; Bowel sounds present. No guarding, rebound tenderness, or rigidity.  EXTREMITIES: 2+ Peripheral Pulses +nonpitting edema b/l LE w/ w/ hyperkeratotic/indurated skin, erythematous over dorsal feet and distal LEs b/l L > R w/o excessive unilateral warmth or tenderness to palpation of skin (pt states appearance is at bl). +posterior knee medial aspect w/ small skin tear at skin fold w/o overt purulence/bleed +examination of L knee difficult given habitus, however tender to palpation over L knee and L hip w/o overt warmth compared to contralateral side, w/o palpated effusion though difficult exam. +pt unable to actively flex at L knee and severe pain w/ passive flexion at L knee

## 2023-12-10 NOTE — H&P ADULT - PROBLEM SELECTOR PLAN 11
- chem vte ppx eliquis  - diet pending dysphagia screen  - fall precaution  - dispo pending course, PT kerry

## 2023-12-10 NOTE — H&P ADULT - NSICDXFAMILYHX_GEN_ALL_CORE_FT
FAMILY HISTORY:  Father  Still living? Unknown  FH: atrial fibrillation, Age at diagnosis: Age Unknown    Mother  Still living? Unknown  FH: arthritis, Age at diagnosis: Age Unknown

## 2023-12-10 NOTE — CONSULT NOTE ADULT - SUBJECTIVE AND OBJECTIVE BOX
73F morbidly obese, chronic b/l lymphedema, HTN, HLD, hypothyroidism, AFib, ambulates w/ walker at bl, admit 6/2021 for septic L knee s/p washout c/c/b S aureus bacteremia, now p/w L knee pain and resultant inability to bear wt. Pt relays b/l health when 11/24 experienced fever tmax 102F accompanied by diffuse myalgias which abated after 12hrs. Subsequently, 1 wk ago, developed L knee pain precluding ability to bear wt despite walker use, prompting ED presentation. Of note, she relays appearance of b/l LE skin is at bl other than posterior L knee skin tear which she states bled earlier in day. Patient denies numbness or tingling in the RLE. Patient denies hx of trauma. Patient denies fever/chills.    ROS: 10 point review of systems otherwise negative unless noted in HPI    CBC Full  -  ( 10 Dec 2023 07:04 )  WBC Count : 11.29 K/uL  RBC Count : 4.40 M/uL  Hemoglobin : 10.8 g/dL  Hematocrit : 36.4 %  Platelet Count - Automated : 459 K/uL  Mean Cell Volume : 82.7 fl  Mean Cell Hemoglobin : 24.5 pg  Mean Cell Hemoglobin Concentration : 29.7 gm/dL  Auto Neutrophil # : 8.27 K/uL  Auto Lymphocyte # : 1.59 K/uL  Auto Monocyte # : 1.21 K/uL  Auto Eosinophil # : 0.10 K/uL  Auto Basophil # : 0.07 K/uL  Auto Neutrophil % : 73.3 %  Auto Lymphocyte % : 14.1 %  Auto Monocyte % : 10.7 %  Auto Eosinophil % : 0.9 %  Auto Basophil % : 0.6 %    12-10    137  |  98  |  13  ----------------------------<  108<H>  4.5   |  31  |  0.65    Ca    9.6      10 Dec 2023 07:04  Phos  3.7     12-10  Mg     2.0     12-10    TPro  8.2  /  Alb  3.8  /  TBili  0.3  /  DBili  x   /  AST  12  /  ALT  9<L>  /  AlkPhos  92  12-09      PMH:  Lymphedema    HTN (hypertension)    Hypothyroidism    Morbid obesity    Chronic atrial fibrillation    Septic arthritis of knee, left      PSH:  No significant past surgical history    S/P appendectomy    H/O Spinal surgery    H/O left knee surgery      AH:  No Known Allergies    Meds: See med rec    T(C): 36.2 (12-10-23 @ 04:45)  HR: 114 (12-10-23 @ 04:45)  BP: 124/88 (12-10-23 @ 04:45)  RR: 18 (12-10-23 @ 04:45)  SpO2: 94% (12-10-23 @ 04:45)  Wt(kg): --      Gen: NAD  Resp: Unlabored breathing  LLE  Exam limited by body habitus, difficult to appreciate any bony anatomy  Skin intact, no erythema or drainage, lower extremity cellulitic changes  TTP over knee  ROM limited by body habitus, poor ROM at baseline, pain with micromotion  +TA/EHL/FHL/GS  SILT L2-S1  + pulses  Compartments soft and compressible    Secondary:  No TTP over bony landmarks, SILT BL, ROM intact BL, distal pulses palpable.    Imaging:  XR L knee demonstrating severe knee arthrosis     73F morbidly obese, chronic b/l lymphedema, HTN, HLD, hypothyroidism, AFib on Inez , ambulates w/ walker at bl, admit 6/2021 for septic L knee washout w r/o L septic knee    - WBAT L knee  - Recommend IR aspiration of L knee w possible drain placement  - Ortho will cont. to follow  - f/u BCx, ESR/CRP  - NPO after IR aspiration until cultures/gram stain result

## 2023-12-10 NOTE — H&P ADULT - PROBLEM SELECTOR PLAN 3
w/o DVT on US though limited study  erythematous LE b/l though pt states is bl, exam w/o overt warmth or skin tenderness  skin tear posterior L knee medial aspect   - wound c/s and mgt of potential infxn as above

## 2023-12-10 NOTE — PATIENT PROFILE ADULT - FALL HARM RISK - HARM RISK INTERVENTIONS
Assistance with ambulation/Assistance OOB with selected safe patient handling equipment/Communicate Risk of Fall with Harm to all staff/Discuss with provider need for PT consult/Monitor gait and stability/Provide patient with walking aids - walker, cane, crutches/Reinforce activity limits and safety measures with patient and family/Tailored Fall Risk Interventions/Visual Cue: Yellow wristband and red socks/Bed in lowest position, wheels locked, appropriate side rails in place/Call bell, personal items and telephone in reach/Instruct patient to call for assistance before getting out of bed or chair/Non-slip footwear when patient is out of bed/[place] to call system/Physically safe environment - no spills, clutter or unnecessary equipment/Purposeful Proactive Rounding/Room/bathroom lighting operational, light cord in reach Assistance with ambulation/Assistance OOB with selected safe patient handling equipment/Communicate Risk of Fall with Harm to all staff/Discuss with provider need for PT consult/Monitor gait and stability/Provide patient with walking aids - walker, cane, crutches/Reinforce activity limits and safety measures with patient and family/Tailored Fall Risk Interventions/Visual Cue: Yellow wristband and red socks/Bed in lowest position, wheels locked, appropriate side rails in place/Call bell, personal items and telephone in reach/Instruct patient to call for assistance before getting out of bed or chair/Non-slip footwear when patient is out of bed/[place] to call system/Physically safe environment - no spills, clutter or unnecessary equipment/Purposeful Proactive Rounding/Room/bathroom lighting operational, light cord in reach

## 2023-12-10 NOTE — H&P ADULT - PROBLEM SELECTOR PLAN 1
met w/ tachycardia, leukocytosis  relays inability to bear wt due to severe L knee pain, exam w/ severe pain to passive ROM at knee, difficult exam given habitus though no overt effusion palpated  XR w/o findings c/f septic arthritis though pt w/ significant hx   - c/s ortho re need for synovial fl eval (Cx, gram stain, crystals analysis, WBC w/ diff), in interim will obtain US L knee, ESR/CRP, BCx already in lab  - cover w/ vanc (given recent abx use will cover MRSA) in interim (though pt w/ erythematous LE b/l, states this is bl, however will cover cellulitis)   - obtain XR L hip given tenderness on exam  - ISTOP re pain meds, c/w home doses  - collateral from Dr. Bender in AM re recent lab w/u met w/ tachycardia, leukocytosis  relays inability to bear wt due to severe L knee pain, exam w/ severe pain to passive ROM at knee, difficult exam given habitus though no overt effusion palpated  XR w/o findings c/f septic arthritis though pt w/ significant hx   - c/s ortho re need for synovial fl eval (Cx, gram stain, crystals analysis, WBC w/ diff), in interim will obtain ESR/CRP, BCx already in lab  - cover w/ vanc (given recent abx use will cover MRSA) in interim (though pt w/ erythematous LE b/l, states this is bl, however will cover cellulitis)   - obtain XR L hip given tenderness on exam  - ISTOP re pain meds, c/w home doses  - collateral from Dr. Bender in AM re recent lab w/u met w/ tachycardia, leukocytosis  relays inability to bear wt due to severe L knee pain, exam w/ severe pain to passive ROM at knee, difficult exam given habitus though no overt effusion palpated  XR w/o findings c/f septic arthritis though pt w/ significant hx   - c/s ortho re need for synovial fl eval (Cx, gram stain, crystals analysis, WBC w/ diff), in interim will obtain ESR/CRP, BCx already in lab, d/w ortho resident Irvin who states he will see pt and interim hold abx  - vanc (given recent abx use will cover MRSA) in interim (though pt w/ erythematous LE b/l, states this is bl, however will cover cellulitis) dc'd given ortho recs as above in interim of evaluation  - obtain XR L hip given tenderness on exam  - ISTOP re pain meds, c/w home doses  - collateral from Dr. Bender in AM re recent lab w/u

## 2023-12-10 NOTE — H&P ADULT - NSHPREVIEWOFSYSTEMS_GEN_ALL_CORE
REVIEW OF SYSTEMS:  CONSTITUTIONAL: No chills +fever wks ago resolved  EYES: No visual disturbances or eye pain  ENMT: No sinus or throat pain  RESPIRATORY: No shortness of breath or cough  CARDIOVASCULAR: No chest pain or dizziness  GASTROINTESTINAL: No abdominal or epigastric pain. No diarrhea or constipation. No melena or hematochezia.   GENITOURINARY: No dysuria or hematuria  NEUROLOGICAL: No headaches, loss of strength or numbness  MUSCULOSKELETAL: +L knee pain as per HPI  DERMATOLOGIC: +chronic b/l LE lymphedema which pt relays is at bl

## 2023-12-10 NOTE — CONSULT NOTE ADULT - SUBJECTIVE AND OBJECTIVE BOX
Vascular & Interventional Radiology Consult Note    Evaluate for Procedure: L knee arthrocentesis    HPI: 73y Female with morbid obesity, chronic b/l LE lymphedema, a fib on Eliquis (reportedly taken last yesterday afternoon) with h/o L knee septic arthritis in 2021 requiring washout presented with  2 weeks of L knee pain with reported fever 11/24 treated with outpatient abx. patient with mild leukocytosis upon arrival without fevers.    Allergies: No Known Allergies    Medications (Abx/Cardiac/Anticoagulation/Blood Products)  apixaban: 5 milliGRAM(s) Oral (12-10 @ 05:56)  furosemide    Tablet: 20 milliGRAM(s) Oral (12-10 @ 05:56)  metoprolol tartrate: 12.5 milliGRAM(s) Oral (12-10 @ 05:56)  piperacillin/tazobactam IVPB...: 200 mL/Hr IV Intermittent (12-09 @ 21:17)  vancomycin  IVPB.: 250 mL/Hr IV Intermittent (12-09 @ 22:35)    Data:  165.1  192.777  T(C): 37.3  HR: 120  BP: 130/80  RR: 20  SpO2: 96%    -WBC 11.29 / HgB 10.8 / Hct 36.4 / Plt 459  -Na 137 / Cl 98 / BUN 13 / Glucose 108  -K 4.5 / CO2 31 / Cr 0.65  -ALT -- / Alk Phos -- / T.Bili --  -INR 1.55 / PTT 35.2      Imaging: CT L knee reviewed with evidence of cellulitis and small non specific joint effusion

## 2023-12-11 LAB
ANION GAP SERPL CALC-SCNC: 11 MMOL/L — SIGNIFICANT CHANGE UP (ref 5–17)
ANION GAP SERPL CALC-SCNC: 11 MMOL/L — SIGNIFICANT CHANGE UP (ref 5–17)
APTT BLD: 36.6 SEC — HIGH (ref 24.5–35.6)
APTT BLD: 36.6 SEC — HIGH (ref 24.5–35.6)
B PERT IGG+IGM PNL SER: ABNORMAL
B PERT IGG+IGM PNL SER: ABNORMAL
BUN SERPL-MCNC: 12 MG/DL — SIGNIFICANT CHANGE UP (ref 7–23)
BUN SERPL-MCNC: 12 MG/DL — SIGNIFICANT CHANGE UP (ref 7–23)
CALCIUM SERPL-MCNC: 9.1 MG/DL — SIGNIFICANT CHANGE UP (ref 8.4–10.5)
CALCIUM SERPL-MCNC: 9.1 MG/DL — SIGNIFICANT CHANGE UP (ref 8.4–10.5)
CHLORIDE SERPL-SCNC: 97 MMOL/L — SIGNIFICANT CHANGE UP (ref 96–108)
CHLORIDE SERPL-SCNC: 97 MMOL/L — SIGNIFICANT CHANGE UP (ref 96–108)
CO2 SERPL-SCNC: 27 MMOL/L — SIGNIFICANT CHANGE UP (ref 22–31)
CO2 SERPL-SCNC: 27 MMOL/L — SIGNIFICANT CHANGE UP (ref 22–31)
COLOR FLD: ABNORMAL
COLOR FLD: ABNORMAL
CREAT SERPL-MCNC: 0.67 MG/DL — SIGNIFICANT CHANGE UP (ref 0.5–1.3)
CREAT SERPL-MCNC: 0.67 MG/DL — SIGNIFICANT CHANGE UP (ref 0.5–1.3)
CULTURE RESULTS: ABNORMAL
CULTURE RESULTS: ABNORMAL
EGFR: 92 ML/MIN/1.73M2 — SIGNIFICANT CHANGE UP
EGFR: 92 ML/MIN/1.73M2 — SIGNIFICANT CHANGE UP
FLUID INTAKE SUBSTANCE CLASS: SIGNIFICANT CHANGE UP
FLUID INTAKE SUBSTANCE CLASS: SIGNIFICANT CHANGE UP
GLUCOSE SERPL-MCNC: 114 MG/DL — HIGH (ref 70–99)
GLUCOSE SERPL-MCNC: 114 MG/DL — HIGH (ref 70–99)
GRAM STN FLD: SIGNIFICANT CHANGE UP
GRAM STN FLD: SIGNIFICANT CHANGE UP
HCT VFR BLD CALC: 36 % — SIGNIFICANT CHANGE UP (ref 34.5–45)
HCT VFR BLD CALC: 36 % — SIGNIFICANT CHANGE UP (ref 34.5–45)
HCV AB S/CO SERPL IA: 0.08 S/CO — SIGNIFICANT CHANGE UP (ref 0–0.99)
HCV AB S/CO SERPL IA: 0.08 S/CO — SIGNIFICANT CHANGE UP (ref 0–0.99)
HCV AB SERPL-IMP: SIGNIFICANT CHANGE UP
HCV AB SERPL-IMP: SIGNIFICANT CHANGE UP
HGB BLD-MCNC: 10.7 G/DL — LOW (ref 11.5–15.5)
HGB BLD-MCNC: 10.7 G/DL — LOW (ref 11.5–15.5)
INR BLD: 1.4 RATIO — HIGH (ref 0.85–1.18)
INR BLD: 1.4 RATIO — HIGH (ref 0.85–1.18)
INR BLD: ABNORMAL RATIO (ref 0.85–1.18)
INR BLD: ABNORMAL RATIO (ref 0.85–1.18)
LYMPHOCYTES # FLD: 11 % — SIGNIFICANT CHANGE UP
LYMPHOCYTES # FLD: 11 % — SIGNIFICANT CHANGE UP
MCHC RBC-ENTMCNC: 24.2 PG — LOW (ref 27–34)
MCHC RBC-ENTMCNC: 24.2 PG — LOW (ref 27–34)
MCHC RBC-ENTMCNC: 29.7 GM/DL — LOW (ref 32–36)
MCHC RBC-ENTMCNC: 29.7 GM/DL — LOW (ref 32–36)
MCV RBC AUTO: 81.4 FL — SIGNIFICANT CHANGE UP (ref 80–100)
MCV RBC AUTO: 81.4 FL — SIGNIFICANT CHANGE UP (ref 80–100)
MONOS+MACROS # FLD: 11 % — SIGNIFICANT CHANGE UP
MONOS+MACROS # FLD: 11 % — SIGNIFICANT CHANGE UP
MRSA PCR RESULT.: SIGNIFICANT CHANGE UP
MRSA PCR RESULT.: SIGNIFICANT CHANGE UP
NEUTROPHILS-BODY FLUID: 78 % — SIGNIFICANT CHANGE UP
NEUTROPHILS-BODY FLUID: 78 % — SIGNIFICANT CHANGE UP
NRBC # BLD: 0 /100 WBCS — SIGNIFICANT CHANGE UP (ref 0–0)
NRBC # BLD: 0 /100 WBCS — SIGNIFICANT CHANGE UP (ref 0–0)
ORGANISM # SPEC MICROSCOPIC CNT: ABNORMAL
PLATELET # BLD AUTO: 413 K/UL — HIGH (ref 150–400)
PLATELET # BLD AUTO: 413 K/UL — HIGH (ref 150–400)
POTASSIUM SERPL-MCNC: 3.8 MMOL/L — SIGNIFICANT CHANGE UP (ref 3.5–5.3)
POTASSIUM SERPL-MCNC: 3.8 MMOL/L — SIGNIFICANT CHANGE UP (ref 3.5–5.3)
POTASSIUM SERPL-SCNC: 3.8 MMOL/L — SIGNIFICANT CHANGE UP (ref 3.5–5.3)
POTASSIUM SERPL-SCNC: 3.8 MMOL/L — SIGNIFICANT CHANGE UP (ref 3.5–5.3)
PROTHROM AB SERPL-ACNC: 15.3 SEC — HIGH (ref 9.5–13)
PROTHROM AB SERPL-ACNC: 15.3 SEC — HIGH (ref 9.5–13)
PROTHROM AB SERPL-ACNC: >200 SEC — HIGH (ref 9.5–13)
PROTHROM AB SERPL-ACNC: >200 SEC — HIGH (ref 9.5–13)
RAPID RVP RESULT: SIGNIFICANT CHANGE UP
RAPID RVP RESULT: SIGNIFICANT CHANGE UP
RBC # BLD: 4.42 M/UL — SIGNIFICANT CHANGE UP (ref 3.8–5.2)
RBC # BLD: 4.42 M/UL — SIGNIFICANT CHANGE UP (ref 3.8–5.2)
RBC # FLD: 15.7 % — HIGH (ref 10.3–14.5)
RBC # FLD: 15.7 % — HIGH (ref 10.3–14.5)
RCV VOL RI: 8000 CELLS/UL — HIGH (ref 0–5)
RCV VOL RI: 8000 CELLS/UL — HIGH (ref 0–5)
S AUREUS DNA NOSE QL NAA+PROBE: SIGNIFICANT CHANGE UP
S AUREUS DNA NOSE QL NAA+PROBE: SIGNIFICANT CHANGE UP
SARS-COV-2 RNA SPEC QL NAA+PROBE: SIGNIFICANT CHANGE UP
SARS-COV-2 RNA SPEC QL NAA+PROBE: SIGNIFICANT CHANGE UP
SODIUM SERPL-SCNC: 135 MMOL/L — SIGNIFICANT CHANGE UP (ref 135–145)
SODIUM SERPL-SCNC: 135 MMOL/L — SIGNIFICANT CHANGE UP (ref 135–145)
SPECIMEN SOURCE: SIGNIFICANT CHANGE UP
TOTAL NUCLEATED CELL COUNT, BODY FLUID: 13 CELLS/UL — HIGH (ref 0–5)
TOTAL NUCLEATED CELL COUNT, BODY FLUID: 13 CELLS/UL — HIGH (ref 0–5)
TUBE TYPE: SIGNIFICANT CHANGE UP
TUBE TYPE: SIGNIFICANT CHANGE UP
WBC # BLD: 10.82 K/UL — HIGH (ref 3.8–10.5)
WBC # BLD: 10.82 K/UL — HIGH (ref 3.8–10.5)
WBC # FLD AUTO: 10.82 K/UL — HIGH (ref 3.8–10.5)
WBC # FLD AUTO: 10.82 K/UL — HIGH (ref 3.8–10.5)

## 2023-12-11 PROCEDURE — 20611 DRAIN/INJ JOINT/BURSA W/US: CPT | Mod: LT

## 2023-12-11 PROCEDURE — 99222 1ST HOSP IP/OBS MODERATE 55: CPT

## 2023-12-11 RX ORDER — CEFEPIME 1 G/1
2000 INJECTION, POWDER, FOR SOLUTION INTRAMUSCULAR; INTRAVENOUS EVERY 12 HOURS
Refills: 0 | Status: DISCONTINUED | OUTPATIENT
Start: 2023-12-11 | End: 2023-12-12

## 2023-12-11 RX ADMIN — Medication 12.5 MILLIGRAM(S): at 17:45

## 2023-12-11 RX ADMIN — Medication 20 MILLIGRAM(S): at 07:30

## 2023-12-11 RX ADMIN — Medication 650 MILLIGRAM(S): at 20:09

## 2023-12-11 RX ADMIN — ATORVASTATIN CALCIUM 10 MILLIGRAM(S): 80 TABLET, FILM COATED ORAL at 21:23

## 2023-12-11 RX ADMIN — HEPARIN SODIUM 5000 UNIT(S): 5000 INJECTION INTRAVENOUS; SUBCUTANEOUS at 21:22

## 2023-12-11 RX ADMIN — Medication 650 MILLIGRAM(S): at 00:00

## 2023-12-11 RX ADMIN — CHLORHEXIDINE GLUCONATE 1 APPLICATION(S): 213 SOLUTION TOPICAL at 12:52

## 2023-12-11 RX ADMIN — Medication 250 MILLIGRAM(S): at 05:35

## 2023-12-11 RX ADMIN — OXYCODONE HYDROCHLORIDE 5 MILLIGRAM(S): 5 TABLET ORAL at 15:37

## 2023-12-11 RX ADMIN — Medication 250 MILLIGRAM(S): at 17:49

## 2023-12-11 RX ADMIN — CEFEPIME 100 MILLIGRAM(S): 1 INJECTION, POWDER, FOR SOLUTION INTRAMUSCULAR; INTRAVENOUS at 23:34

## 2023-12-11 RX ADMIN — OXYCODONE HYDROCHLORIDE 5 MILLIGRAM(S): 5 TABLET ORAL at 16:25

## 2023-12-11 RX ADMIN — CEFEPIME 100 MILLIGRAM(S): 1 INJECTION, POWDER, FOR SOLUTION INTRAMUSCULAR; INTRAVENOUS at 12:36

## 2023-12-11 RX ADMIN — OXYCODONE HYDROCHLORIDE 5 MILLIGRAM(S): 5 TABLET ORAL at 08:34

## 2023-12-11 RX ADMIN — Medication 650 MILLIGRAM(S): at 21:09

## 2023-12-11 RX ADMIN — OXYCODONE HYDROCHLORIDE 5 MILLIGRAM(S): 5 TABLET ORAL at 11:45

## 2023-12-11 RX ADMIN — Medication 12.5 MILLIGRAM(S): at 05:35

## 2023-12-11 RX ADMIN — OXYCODONE HYDROCHLORIDE 5 MILLIGRAM(S): 5 TABLET ORAL at 22:55

## 2023-12-11 RX ADMIN — HEPARIN SODIUM 5000 UNIT(S): 5000 INJECTION INTRAVENOUS; SUBCUTANEOUS at 12:52

## 2023-12-11 RX ADMIN — Medication 25 MICROGRAM(S): at 05:35

## 2023-12-11 RX ADMIN — OXYCODONE HYDROCHLORIDE 5 MILLIGRAM(S): 5 TABLET ORAL at 21:55

## 2023-12-11 RX ADMIN — DULOXETINE HYDROCHLORIDE 60 MILLIGRAM(S): 30 CAPSULE, DELAYED RELEASE ORAL at 12:52

## 2023-12-11 RX ADMIN — HEPARIN SODIUM 5000 UNIT(S): 5000 INJECTION INTRAVENOUS; SUBCUTANEOUS at 05:35

## 2023-12-11 NOTE — PROGRESS NOTE ADULT - ASSESSMENT
73F morbidly obese, chronic b/l lymphedema, HTN, HLD, hypothyroidism, AFib on Inez , ambulates w/ walker at bl, admit 6/2021 for septic L knee washout w r/o L septic knee      Septic Joint   Bacteremia    CT small joint effusion, synovitis; tricompartmental OA, edema/skin thickening  IR aspiration--serosanguinous drainage, 13 WBC, 8000 RBC  BCX 2/4 Staph epi,   Synovial culture pending      Overall, Fever, leukocytosis, knee pain  - Cefepime 2g q 12  - Vanco 2g q 12   - F/U BCXs    ID consult

## 2023-12-11 NOTE — PRE PROCEDURE NOTE - PRE PROCEDURE EVALUATION
Interventional Radiology    HPI: 73y Female with concern for left knee septic arthritis.    Allergies: No Known Allergies    Medications (Abx/Cardiac/Anticoagulation/Blood Products)  apixaban: 5 milliGRAM(s) Oral (12-10 @ 05:56)  cefepime   IVPB: 100 mL/Hr IV Intermittent (12-10 @ 23:24)  cefepime   IVPB: 100 mL/Hr IV Intermittent (12-10 @ 17:30)  furosemide    Tablet: 20 milliGRAM(s) Oral (12-11 @ 07:30)  heparin   Injectable: 5000 Unit(s) SubCutaneous (12-11 @ 05:35)  metoprolol tartrate: 12.5 milliGRAM(s) Oral (12-11 @ 05:35)  piperacillin/tazobactam IVPB...: 200 mL/Hr IV Intermittent (12-09 @ 21:17)  vancomycin  IVPB: 250 mL/Hr IV Intermittent (12-11 @ 05:35)  vancomycin  IVPB.: 250 mL/Hr IV Intermittent (12-09 @ 22:35)    Data:    T(C): 36.7  HR: 106  BP: 140/84  RR: 20  SpO2: 97%    Exam  General: No acute distress  Chest: Non labored breathing  Abdomen: Non-distended  Extremities: No swelling, warm    -WBC 10.82 / HgB 10.7 / Hct 36.0 / Plt 413  -Na 135 / Cl 97 / BUN 12 / Glucose 114  -K 3.8 / CO2 27 / Cr 0.67  -ALT -- / Alk Phos -- / T.Bili --  -INR1.40    Imaging: Reviewed    Plan: 73y Female presents for left knee arthrocentesis  -Risks/Benefits/alternatives explained with the patient and/or healthcare proxy and witnessed informed consent obtained.

## 2023-12-11 NOTE — CONSULT NOTE ADULT - SUBJECTIVE AND OBJECTIVE BOX
"HPI:  73F morbidly obese, chronic b/l lymphedema, HTN, HLD, hypothyroidism, AFib, ambulates w/ walker at bl, admit 6/2021 for septic L knee s/p washout c/c/b S aureus bacteremia, now p/w L knee pain and resultant inability to bear wt. Pt relays bl health when 11/24 experienced fever tmax 102F accompanied by diffuse myalgias which abated after 12hrs. Was evaluated by home visit Dr. Bender 11/28 who obtained labs, the results of which were relayed to her 12/2 as revealing of a "bad infection" though pt unclear as to source, was prescribed levaquin for this. Subsequently, 1 wk ago, developed L knee pain precluding ability to bear wt despite walker use, prompting ED presentation. Of note, she relays appearance of b/l LE skin is at bl other than posterior L knee skin tear which she states bled earlier in day. Otherwise she denies complaints.     VS significant for tachycardia HR 110s, hypertensive BP 120s-160s/80s-100    Labs c/f leukocytosis 12.45, normocytic (MCV 84.1) anemia H/H 10.8/37.1, thrombocytosis 464; abn coags (PT 16.8, INR 1.55); BCx x 2 in lab     XR L knee: redemonstrated diffuse osteophytes involving the femoral condyles and tibial plateau. Redemonstrated chronic dislocation of the patella with large osteophytes and remodeling.     VA duplex LE vein scan b/l: small R Hooks's cyst, limited study though no e/o DVT in visualized segments     Received morphine 4mg IV x 2, vanc 1g IV, zosyn 3.375g IV   (10 Dec 2023 05:01)"    Above reviewed. 74 yo M lymphedema, prior L knee septic knee s/p washout, staph bacteremia--no apparent hardware, here with L knee pain  Patient had fevers as outpatient was seen by outside physician and sent patient in for further care  Given Levaquin  No chest pain, no sob  No bad pain, no diarrhea  Significant pain at LLE knee  Normally ambulatory with walker  ID called for further eval      PAST MEDICAL & SURGICAL HISTORY:  Lymphedema      HTN (hypertension)      Hypothyroidism      Morbid obesity      Chronic atrial fibrillation      Septic arthritis of knee, left      S/P appendectomy      H/O Spinal surgery      H/O left knee surgery    Allergies    No Known Allergies    Intolerances    ANTIMICROBIALS:  cefepime   IVPB    cefepime   IVPB 2000 every 8 hours  vancomycin  IVPB 2000 every 12 hours    OTHER MEDS:  acetaminophen     Tablet .. 650 milliGRAM(s) Oral every 6 hours PRN  atorvastatin 10 milliGRAM(s) Oral at bedtime  chlorhexidine 2% Cloths 1 Application(s) Topical daily  DULoxetine 60 milliGRAM(s) Oral daily  furosemide    Tablet 20 milliGRAM(s) Oral daily  heparin   Injectable 5000 Unit(s) SubCutaneous every 8 hours  levothyroxine 25 MICROGram(s) Oral daily  metoprolol tartrate 12.5 milliGRAM(s) Oral two times a day  morphine  - Injectable 1 milliGRAM(s) IV Push every 6 hours PRN  oxyCODONE    IR 5 milliGRAM(s) Oral every 6 hours PRN    SOCIAL HISTORY: No tobacco, no alcohol, no illicit drugs    FAMILY HISTORY:  FH: atrial fibrillation (Father)    FH: arthritis (Mother)    Drug Dosing Weight  Height (cm): 165.1 (09 Dec 2023 16:50)  Weight (kg): 192.777 (09 Dec 2023 16:50)  BMI (kg/m2): 70.7 (09 Dec 2023 16:50)  BSA (m2): 2.73 (09 Dec 2023 16:50)    PE:    Vital Signs Last 24 Hrs  T(C): 37.1 (11 Dec 2023 11:45), Max: 37.2 (10 Dec 2023 18:45)  T(F): 98.7 (11 Dec 2023 11:45), Max: 98.9 (10 Dec 2023 18:45)  HR: 110 (11 Dec 2023 11:45) (106 - 127)  BP: 138/84 (11 Dec 2023 11:45) (118/82 - 140/84)  RR: 20 (11 Dec 2023 11:45) (20 - 20)  SpO2: 95% (11 Dec 2023 11:45) (93% - 97%)    Gen: AOx3, NAD, non-toxic, pleasant  CV: S1+S2 normal, nontachycardic  Resp: Clear bilat, no resp distress, no crackles/wheezes  Abd: Soft, nontender, +BS  Ext: Pain/warmth at L knee  : No Clark  IV/Skin: No thrombophlebitis  Msk: No low back pain, no arthralgias, no joint swelling  Neuro: No sensory deficits, no motor deficits    LABS:                        10.7   10.82 )-----------( 413      ( 11 Dec 2023 06:14 )             36.0     12-11    135  |  97  |  12  ----------------------------<  114<H>  3.8   |  27  |  0.67    Ca    9.1      11 Dec 2023 06:14  Phos  3.7     12-10  Mg     2.0     12-10    TPro  8.2  /  Alb  3.8  /  TBili  0.3  /  DBili  x   /  AST  12  /  ALT  9<L>  /  AlkPhos  92  12-09    Urinalysis Basic - ( 11 Dec 2023 06:14 )    Color: x / Appearance: x / SG: x / pH: x  Gluc: 114 mg/dL / Ketone: x  / Bili: x / Urobili: x   Blood: x / Protein: x / Nitrite: x   Leuk Esterase: x / RBC: x / WBC x   Sq Epi: x / Non Sq Epi: x / Bacteria: x    MICROBIOLOGY:    .Blood Blood-Peripheral  12-09-23   Growth in anaerobic bottle: Gram Positive Cocci in Clusters  Growth in aerobic bottle: Staphylococcus epidermidis  Isolation of Coagulase negative Staphylococcus from single blood culture  sets may represent  contamination. Contact the MicrobiologyDepartment at [phone] if  susceptibility testing is  clinically indicated.  Direct identification is available within approximately 3-5  hours either by Blood Panel Multiplexed PCR or Direct  MALDI-TOF. Details: [email or web address]  --  Blood Culture PCR    .Blood Blood-Peripheral  12-09-23   No growth at 24 hours  --  --    RADIOLOGY:    12/10 CT      IMPRESSION:    Nonspecific small knee joint effusion with mild synovitis. If there is   clinical concern for septic arthritis, knee joint aspiration should be   performed.    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    Nonspecific subcutaneous edema with skin thickening about the visualized   distal thigh, knee, and proximal calf, which may be related to lower   extremity edema or cellulitis. No drainable fluid collection seen within   the soft tissues. No soft tissue gas seen. "HPI:  73F morbidly obese, chronic b/l lymphedema, HTN, HLD, hypothyroidism, AFib, ambulates w/ walker at bl, admit 6/2021 for septic L knee s/p washout c/c/b S aureus bacteremia, now p/w L knee pain and resultant inability to bear wt. Pt relays bl health when 11/24 experienced fever tmax 102F accompanied by diffuse myalgias which abated after 12hrs. Was evaluated by home visit Dr. Bender 11/28 who obtained labs, the results of which were relayed to her 12/2 as revealing of a "bad infection" though pt unclear as to source, was prescribed levaquin for this. Subsequently, 1 wk ago, developed L knee pain precluding ability to bear wt despite walker use, prompting ED presentation. Of note, she relays appearance of b/l LE skin is at bl other than posterior L knee skin tear which she states bled earlier in day. Otherwise she denies complaints.     VS significant for tachycardia HR 110s, hypertensive BP 120s-160s/80s-100    Labs c/f leukocytosis 12.45, normocytic (MCV 84.1) anemia H/H 10.8/37.1, thrombocytosis 464; abn coags (PT 16.8, INR 1.55); BCx x 2 in lab     XR L knee: redemonstrated diffuse osteophytes involving the femoral condyles and tibial plateau. Redemonstrated chronic dislocation of the patella with large osteophytes and remodeling.     VA duplex LE vein scan b/l: small R Hooks's cyst, limited study though no e/o DVT in visualized segments     Received morphine 4mg IV x 2, vanc 1g IV, zosyn 3.375g IV   (10 Dec 2023 05:01)"    Above reviewed. 74 yo M lymphedema, prior L knee septic knee s/p washout, staph bacteremia--no apparent hardware, here with L knee pain  Patient had fevers as outpatient was seen by outside physician and sent patient in for further care  Given Levaquin  No chest pain, no sob  No bad pain, no diarrhea  Significant pain at LLE knee  Normally ambulatory with walker  ID called for further eval      PAST MEDICAL & SURGICAL HISTORY:  Lymphedema      HTN (hypertension)      Hypothyroidism      Morbid obesity      Chronic atrial fibrillation      Septic arthritis of knee, left      S/P appendectomy      H/O Spinal surgery      H/O left knee surgery    Allergies    No Known Allergies    Intolerances    ANTIMICROBIALS:  cefepime   IVPB    cefepime   IVPB 2000 every 8 hours  vancomycin  IVPB 2000 every 12 hours    OTHER MEDS:  acetaminophen     Tablet .. 650 milliGRAM(s) Oral every 6 hours PRN  atorvastatin 10 milliGRAM(s) Oral at bedtime  chlorhexidine 2% Cloths 1 Application(s) Topical daily  DULoxetine 60 milliGRAM(s) Oral daily  furosemide    Tablet 20 milliGRAM(s) Oral daily  heparin   Injectable 5000 Unit(s) SubCutaneous every 8 hours  levothyroxine 25 MICROGram(s) Oral daily  metoprolol tartrate 12.5 milliGRAM(s) Oral two times a day  morphine  - Injectable 1 milliGRAM(s) IV Push every 6 hours PRN  oxyCODONE    IR 5 milliGRAM(s) Oral every 6 hours PRN    SOCIAL HISTORY: No tobacco, no alcohol, no illicit drugs    FAMILY HISTORY:  FH: atrial fibrillation (Father)    FH: arthritis (Mother)    Drug Dosing Weight  Height (cm): 165.1 (09 Dec 2023 16:50)  Weight (kg): 192.777 (09 Dec 2023 16:50)  BMI (kg/m2): 70.7 (09 Dec 2023 16:50)  BSA (m2): 2.73 (09 Dec 2023 16:50)    PE:    Vital Signs Last 24 Hrs  T(C): 37.1 (11 Dec 2023 11:45), Max: 37.2 (10 Dec 2023 18:45)  T(F): 98.7 (11 Dec 2023 11:45), Max: 98.9 (10 Dec 2023 18:45)  HR: 110 (11 Dec 2023 11:45) (106 - 127)  BP: 138/84 (11 Dec 2023 11:45) (118/82 - 140/84)  RR: 20 (11 Dec 2023 11:45) (20 - 20)  SpO2: 95% (11 Dec 2023 11:45) (93% - 97%)    Gen: AOx3, NAD, non-toxic, pleasant  CV: S1+S2 normal, nontachycardic  Resp: Clear bilat, no resp distress, no crackles/wheezes  Abd: Soft, nontender, +BS  Ext: Pain/warmth at L knee  : No Clark  IV/Skin: No thrombophlebitis  Msk: No low back pain, no arthralgias, no joint swelling  Neuro: No sensory deficits, no motor deficits    LABS:                        10.7   10.82 )-----------( 413      ( 11 Dec 2023 06:14 )             36.0     12-11    135  |  97  |  12  ----------------------------<  114<H>  3.8   |  27  |  0.67    Ca    9.1      11 Dec 2023 06:14  Phos  3.7     12-10  Mg     2.0     12-10    TPro  8.2  /  Alb  3.8  /  TBili  0.3  /  DBili  x   /  AST  12  /  ALT  9<L>  /  AlkPhos  92  12-09    Urinalysis Basic - ( 11 Dec 2023 06:14 )    Color: x / Appearance: x / SG: x / pH: x  Gluc: 114 mg/dL / Ketone: x  / Bili: x / Urobili: x   Blood: x / Protein: x / Nitrite: x   Leuk Esterase: x / RBC: x / WBC x   Sq Epi: x / Non Sq Epi: x / Bacteria: x    MICROBIOLOGY:    .Blood Blood-Peripheral  12-09-23   Growth in anaerobic bottle: Gram Positive Cocci in Clusters  Growth in aerobic bottle: Staphylococcus epidermidis  Isolation of Coagulase negative Staphylococcus from single blood culture  sets may represent  contamination. Contact the MicrobiologyDepartment at [phone] if  susceptibility testing is  clinically indicated.  Direct identification is available within approximately 3-5  hours either by Blood Panel Multiplexed PCR or Direct  MALDI-TOF. Details: [email or web address]  --  Blood Culture PCR    .Blood Blood-Peripheral  12-09-23   No growth at 24 hours  --  --    RADIOLOGY:    12/10 CT      IMPRESSION:    Nonspecific small knee joint effusion with mild synovitis. If there is   clinical concern for septic arthritis, knee joint aspiration should be   performed.    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    Nonspecific subcutaneous edema with skin thickening about the visualized   distal thigh, knee, and proximal calf, which may be related to lower   extremity edema or cellulitis. No drainable fluid collection seen within   the soft tissues. No soft tissue gas seen.

## 2023-12-11 NOTE — PROGRESS NOTE ADULT - SUBJECTIVE AND OBJECTIVE BOX
Patient is a 73y old  Female who presents with a chief complaint of L knee pain (11 Dec 2023 13:55)      SUBJECTIVE / OVERNIGHT EVENTS:     MEDICATIONS  (STANDING):  atorvastatin 10 milliGRAM(s) Oral at bedtime  cefepime   IVPB 2000 milliGRAM(s) IV Intermittent every 12 hours  chlorhexidine 2% Cloths 1 Application(s) Topical daily  DULoxetine 60 milliGRAM(s) Oral daily  furosemide    Tablet 20 milliGRAM(s) Oral daily  heparin   Injectable 5000 Unit(s) SubCutaneous every 8 hours  levothyroxine 25 MICROGram(s) Oral daily  metoprolol tartrate 12.5 milliGRAM(s) Oral two times a day  vancomycin  IVPB 2000 milliGRAM(s) IV Intermittent every 12 hours    MEDICATIONS  (PRN):  acetaminophen     Tablet .. 650 milliGRAM(s) Oral every 6 hours PRN Temp greater or equal to 38C (100.4F), Mild Pain (1 - 3)  morphine  - Injectable 1 milliGRAM(s) IV Push every 6 hours PRN Severe Pain (7 - 10)  oxyCODONE    IR 5 milliGRAM(s) Oral every 6 hours PRN Severe Pain (7 - 10)      CAPILLARY BLOOD GLUCOSE        I&O's Summary    10 Dec 2023 07:01  -  11 Dec 2023 07:00  --------------------------------------------------------  IN: 120 mL / OUT: 0 mL / NET: 120 mL    11 Dec 2023 07:01  -  11 Dec 2023 20:24  --------------------------------------------------------  IN: 1110 mL / OUT: 300 mL / NET: 810 mL      T(C): 37 (12-11-23 @ 19:39), Max: 37.2 (12-11-23 @ 16:16)  HR: 106 (12-11-23 @ 19:39) (106 - 110)  BP: 120/70 (12-11-23 @ 19:39) (120/70 - 142/84)  RR: 20 (12-11-23 @ 19:39) (20 - 20)  SpO2: 94% (12-11-23 @ 19:39) (94% - 95%)    PHYSICAL EXAM:  GENERAL: NAD, well-developed  HEAD:  Atraumatic, Normocephalic  EYES: EOMI, PERRLA, conjunctiva and sclera clear  NECK: Supple, No JVD  CHEST/LUNG: Clear to auscultation bilaterally; No wheeze  HEART: Regular rate and rhythm; No murmurs, rubs, or gallops  ABDOMEN: Soft, Nontender, Nondistended; Bowel sounds present  EXTREMITIES:  Left knee swollen tender   PSYCH: AAOx3  NEUROLOGY: non-focal  SKIN: No rashes or lesions    LABS:                        10.7   10.82 )-----------( 413      ( 11 Dec 2023 06:14 )             36.0     12-11    135  |  97  |  12  ----------------------------<  114<H>  3.8   |  27  |  0.67    Ca    9.1      11 Dec 2023 06:14  Phos  3.7     12-10  Mg     2.0     12-10      PT/INR - ( 11 Dec 2023 10:10 )   PT: 15.3 sec;   INR: 1.40 ratio         PTT - ( 11 Dec 2023 10:10 )  PTT:36.6 sec      Urinalysis Basic - ( 11 Dec 2023 06:14 )    Color: x / Appearance: x / SG: x / pH: x  Gluc: 114 mg/dL / Ketone: x  / Bili: x / Urobili: x   Blood: x / Protein: x / Nitrite: x   Leuk Esterase: x / RBC: x / WBC x   Sq Epi: x / Non Sq Epi: x / Bacteria: x        RADIOLOGY & ADDITIONAL TESTS:    Imaging Personally Reviewed:    Consultant(s) Notes Reviewed:      Care Discussed with Consultants/Other Providers:

## 2023-12-11 NOTE — CONSULT NOTE ADULT - ASSESSMENT
74 yo M lymphedema, prior L knee septic knee s/p washout, staph bacteremia--no apparent hardware, here with L knee pain  Fever, leukocytosis  L knee pain, fever as outpatient  CT small joint effusion, synovitis; tricompartmental OA, edema/skin thickening  IR aspiration--serosanguinous drainage, 13 WBC, 8000 RBC  BCX 2/4 Staph epi, repeats pending  Synovial culture pending  Based on arthrocentesis, lower suspicion septic joint  BCX result contam?  Remains unclear cause inflammatory process  Overall, Fever, leukocytosis, knee pain  - Cefepime 2g q 12  - Vanco 2g q 12 (monitor levels)  - F/U BCXs  - Monitor for alternate sources infection  - F/U Orthopedics  - If ongoing fevers,would check CT Chest, CT A/P (with contrast if able to tolerate)  - Check RVP    Noman Dior MD  Contact on TEAMS messaging from 9am - 5pm  From 5pm-9am, on weekends, or if no response call [phone] 74 yo M lymphedema, prior L knee septic knee s/p washout, staph bacteremia--no apparent hardware, here with L knee pain  Fever, leukocytosis  L knee pain, fever as outpatient  CT small joint effusion, synovitis; tricompartmental OA, edema/skin thickening  IR aspiration--serosanguinous drainage, 13 WBC, 8000 RBC  BCX 2/4 Staph epi, repeats pending  Synovial culture pending  Based on arthrocentesis, lower suspicion septic joint  BCX result contam?  Remains unclear cause inflammatory process  Overall, Fever, leukocytosis, knee pain  - Cefepime 2g q 12  - Vanco 2g q 12 (monitor levels)  - F/U BCXs  - Monitor for alternate sources infection  - F/U Orthopedics  - If ongoing fevers,would check CT Chest, CT A/P (with contrast if able to tolerate)  - Check RVP    Noman Dior MD  Contact on TEAMS messaging from 9am - 5pm  From 5pm-9am, on weekends, or if no response call [phone]

## 2023-12-12 LAB
HCT VFR BLD CALC: 38.2 % — SIGNIFICANT CHANGE UP (ref 34.5–45)
HCT VFR BLD CALC: 38.2 % — SIGNIFICANT CHANGE UP (ref 34.5–45)
HGB BLD-MCNC: 11.3 G/DL — LOW (ref 11.5–15.5)
HGB BLD-MCNC: 11.3 G/DL — LOW (ref 11.5–15.5)
MCHC RBC-ENTMCNC: 24.6 PG — LOW (ref 27–34)
MCHC RBC-ENTMCNC: 24.6 PG — LOW (ref 27–34)
MCHC RBC-ENTMCNC: 29.6 GM/DL — LOW (ref 32–36)
MCHC RBC-ENTMCNC: 29.6 GM/DL — LOW (ref 32–36)
MCV RBC AUTO: 83 FL — SIGNIFICANT CHANGE UP (ref 80–100)
MCV RBC AUTO: 83 FL — SIGNIFICANT CHANGE UP (ref 80–100)
NRBC # BLD: 0 /100 WBCS — SIGNIFICANT CHANGE UP (ref 0–0)
NRBC # BLD: 0 /100 WBCS — SIGNIFICANT CHANGE UP (ref 0–0)
PLATELET # BLD AUTO: 443 K/UL — HIGH (ref 150–400)
PLATELET # BLD AUTO: 443 K/UL — HIGH (ref 150–400)
RBC # BLD: 4.6 M/UL — SIGNIFICANT CHANGE UP (ref 3.8–5.2)
RBC # BLD: 4.6 M/UL — SIGNIFICANT CHANGE UP (ref 3.8–5.2)
RBC # FLD: 15.9 % — HIGH (ref 10.3–14.5)
RBC # FLD: 15.9 % — HIGH (ref 10.3–14.5)
VANCOMYCIN TROUGH SERPL-MCNC: 15.2 UG/ML — SIGNIFICANT CHANGE UP (ref 10–20)
VANCOMYCIN TROUGH SERPL-MCNC: 15.2 UG/ML — SIGNIFICANT CHANGE UP (ref 10–20)
WBC # BLD: 9.73 K/UL — SIGNIFICANT CHANGE UP (ref 3.8–10.5)
WBC # BLD: 9.73 K/UL — SIGNIFICANT CHANGE UP (ref 3.8–10.5)
WBC # FLD AUTO: 9.73 K/UL — SIGNIFICANT CHANGE UP (ref 3.8–10.5)
WBC # FLD AUTO: 9.73 K/UL — SIGNIFICANT CHANGE UP (ref 3.8–10.5)

## 2023-12-12 PROCEDURE — 99222 1ST HOSP IP/OBS MODERATE 55: CPT

## 2023-12-12 PROCEDURE — 99232 SBSQ HOSP IP/OBS MODERATE 35: CPT

## 2023-12-12 RX ORDER — NYSTATIN CREAM 100000 [USP'U]/G
1 CREAM TOPICAL
Refills: 0 | Status: DISCONTINUED | OUTPATIENT
Start: 2023-12-12 | End: 2023-12-15

## 2023-12-12 RX ADMIN — OXYCODONE HYDROCHLORIDE 5 MILLIGRAM(S): 5 TABLET ORAL at 21:19

## 2023-12-12 RX ADMIN — CEFEPIME 100 MILLIGRAM(S): 1 INJECTION, POWDER, FOR SOLUTION INTRAMUSCULAR; INTRAVENOUS at 11:30

## 2023-12-12 RX ADMIN — Medication 12.5 MILLIGRAM(S): at 17:21

## 2023-12-12 RX ADMIN — Medication 25 MICROGRAM(S): at 05:41

## 2023-12-12 RX ADMIN — Medication 20 MILLIGRAM(S): at 05:41

## 2023-12-12 RX ADMIN — DULOXETINE HYDROCHLORIDE 60 MILLIGRAM(S): 30 CAPSULE, DELAYED RELEASE ORAL at 11:30

## 2023-12-12 RX ADMIN — OXYCODONE HYDROCHLORIDE 5 MILLIGRAM(S): 5 TABLET ORAL at 11:27

## 2023-12-12 RX ADMIN — ATORVASTATIN CALCIUM 10 MILLIGRAM(S): 80 TABLET, FILM COATED ORAL at 21:09

## 2023-12-12 RX ADMIN — NYSTATIN CREAM 1 APPLICATION(S): 100000 CREAM TOPICAL at 17:20

## 2023-12-12 RX ADMIN — HEPARIN SODIUM 5000 UNIT(S): 5000 INJECTION INTRAVENOUS; SUBCUTANEOUS at 05:41

## 2023-12-12 RX ADMIN — HEPARIN SODIUM 5000 UNIT(S): 5000 INJECTION INTRAVENOUS; SUBCUTANEOUS at 21:09

## 2023-12-12 RX ADMIN — OXYCODONE HYDROCHLORIDE 5 MILLIGRAM(S): 5 TABLET ORAL at 20:19

## 2023-12-12 RX ADMIN — CHLORHEXIDINE GLUCONATE 1 APPLICATION(S): 213 SOLUTION TOPICAL at 05:41

## 2023-12-12 RX ADMIN — Medication 12.5 MILLIGRAM(S): at 05:40

## 2023-12-12 RX ADMIN — Medication 250 MILLIGRAM(S): at 06:54

## 2023-12-12 RX ADMIN — OXYCODONE HYDROCHLORIDE 5 MILLIGRAM(S): 5 TABLET ORAL at 10:41

## 2023-12-12 RX ADMIN — HEPARIN SODIUM 5000 UNIT(S): 5000 INJECTION INTRAVENOUS; SUBCUTANEOUS at 13:34

## 2023-12-12 NOTE — PROGRESS NOTE ADULT - SUBJECTIVE AND OBJECTIVE BOX
CC: F/U for Septic joint?    Saw/spoke to patient. No fevers, no chills. No new complaints.    Allergies  No Known Allergies    ANTIMICROBIALS:  cefepime   IVPB 2000 every 12 hours  vancomycin  IVPB 2000 every 12 hours    PE:    Vital Signs Last 24 Hrs  T(C): 36.9 (12 Dec 2023 16:31), Max: 37 (11 Dec 2023 19:39)  T(F): 98.4 (12 Dec 2023 16:31), Max: 98.6 (11 Dec 2023 19:39)  HR: 104 (12 Dec 2023 16:31) (104 - 115)  BP: 128/78 (12 Dec 2023 16:31) (120/70 - 135/79)  RR: 18 (12 Dec 2023 16:31) (18 - 20)  SpO2: 95% (12 Dec 2023 16:31) (94% - 98%)    Gen: AOx3, NAD, non-toxic  CV: Nontachycardic  Resp: Breathing comfortably, RA  Abd: Soft, nontender  IV/Skin: No thrombophlebitis    LABS:                        11.3   9.73  )-----------( 443      ( 12 Dec 2023 06:56 )             38.2     12-11    135  |  97  |  12  ----------------------------<  114<H>  3.8   |  27  |  0.67    Ca    9.1      11 Dec 2023 06:14    Urinalysis Basic - ( 11 Dec 2023 06:14 )    Color: x / Appearance: x / SG: x / pH: x  Gluc: 114 mg/dL / Ketone: x  / Bili: x / Urobili: x   Blood: x / Protein: x / Nitrite: x   Leuk Esterase: x / RBC: x / WBC x   Sq Epi: x / Non Sq Epi: x / Bacteria: x    MICROBIOLOGY:  Vancomycin Level, Trough: 15.2 ug/mL (12-12-23 @ 06:57)    Knee Synovial Fluid  12-11-23   No growth  --    polymorphonuclear leukocytes seen  No organisms seen  by cytocentrifuge    .Blood Blood  12-10-23   No growth at 24 hours  --  --    .Blood Blood  12-10-23   No growth at 24 hours  --  --    .Blood Blood-Peripheral  12-09-23   Growth in aerobic and anaerobic bottles: Staphylococcus epidermidis  Isolation of Coagulase negative Staphylococcus from single blood culture  sets may represent  contamination. Contact the Microbiology Department at [phone] if  susceptibilitytesting is  clinically indicated.  Direct identification is available within approximately 3-5  hours either by Blood Panel Multiplexed PCR or Direct  MALDI-TOF. Details: [email or web address]  --  Blood Culture PCR    .Blood Blood-Peripheral  12-09-23   No growth at 48 Hours  --  --    Rapid RVP Result: NotDetec (12-11 @ 15:08)    RADIOLOGY:    12/10    IMPRESSION:    Nonspecific small knee joint effusion with mild synovitis. If there is   clinical concern for septic arthritis, knee joint aspiration should be   performed.    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    Nonspecific subcutaneous edema with skin thickening about the visualized   distal thigh, knee, and proximal calf, which may be related to lower   extremity edema or cellulitis. No drainable fluid collection seen within   the soft tissues. No soft tissue gas seen.   CC: F/U for Septic joint?    Saw/spoke to patient. No fevers, no chills. No new complaints.    Allergies  No Known Allergies    ANTIMICROBIALS:  cefepime   IVPB 2000 every 12 hours  vancomycin  IVPB 2000 every 12 hours    PE:    Vital Signs Last 24 Hrs  T(C): 36.9 (12 Dec 2023 16:31), Max: 37 (11 Dec 2023 19:39)  T(F): 98.4 (12 Dec 2023 16:31), Max: 98.6 (11 Dec 2023 19:39)  HR: 104 (12 Dec 2023 16:31) (104 - 115)  BP: 128/78 (12 Dec 2023 16:31) (120/70 - 135/79)  RR: 18 (12 Dec 2023 16:31) (18 - 20)  SpO2: 95% (12 Dec 2023 16:31) (94% - 98%)    Gen: AOx3, NAD, non-toxic  CV: Nontachycardic  Resp: Breathing comfortably, RA  Abd: Soft, nontender  IV/Skin: No thrombophlebitis    LABS:                        11.3   9.73  )-----------( 443      ( 12 Dec 2023 06:56 )             38.2     12-11    135  |  97  |  12  ----------------------------<  114<H>  3.8   |  27  |  0.67    Ca    9.1      11 Dec 2023 06:14    Urinalysis Basic - ( 11 Dec 2023 06:14 )    Color: x / Appearance: x / SG: x / pH: x  Gluc: 114 mg/dL / Ketone: x  / Bili: x / Urobili: x   Blood: x / Protein: x / Nitrite: x   Leuk Esterase: x / RBC: x / WBC x   Sq Epi: x / Non Sq Epi: x / Bacteria: x    MICROBIOLOGY:  Vancomycin Level, Trough: 15.2 ug/mL (12-12-23 @ 06:57)    Knee Synovial Fluid  12-11-23   No growth  --    polymorphonuclear leukocytes seen  No organisms seen  by cytocentrifuge    .Blood Blood  12-10-23   No growth at 24 hours  --  --    .Blood Blood  12-10-23   No growth at 24 hours  --  --    .Blood Blood-Peripheral  12-09-23   Growth in aerobic and anaerobic bottles: Staphylococcus epidermidis  Isolation of Coagulase negative Staphylococcus from single blood culture  sets may represent  contamination. Contact the Microbiology Department at [phone] if  susceptibilitytesting is  clinically indicated.  Direct identification is available within approximately 3-5  hours either by Blood Panel Multiplexed PCR or Direct  MALDI-TOF. Details: [email or web address]  --  Blood Culture PCR    .Blood Blood-Peripheral  12-09-23   No growth at 48 Hours  --  --    Rapid RVP Result: NotDetec (12-11 @ 15:08)    RADIOLOGY:    12/10    IMPRESSION:    Nonspecific small knee joint effusion with mild synovitis. If there is   clinical concern for septic arthritis, knee joint aspiration should be   performed.    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    Nonspecific subcutaneous edema with skin thickening about the visualized   distal thigh, knee, and proximal calf, which may be related to lower   extremity edema or cellulitis. No drainable fluid collection seen within   the soft tissues. No soft tissue gas seen.

## 2023-12-12 NOTE — CONSULT NOTE ADULT - SUBJECTIVE AND OBJECTIVE BOX
Wound SURGERY CONSULT NOTE    HPI:  73F morbidly obese, chronic b/l lymphedema, HTN, HLD, hypothyroidism, AFib, ambulates w/ walker at bl, admit 6/2021 for septic L knee s/p washout c/c/b S aureus bacteremia, now p/w L knee pain and resultant inability to bear wt. Pt relays bl health when 11/24 experienced fever tmax 102F accompanied by diffuse myalgias which abated after 12hrs. Was evaluated by home visit Dr. Bender 11/28 who obtained labs, the results of which were relayed to her 12/2 as revealing of a "bad infection" though pt unclear as to source, was prescribed levaquin for this. Subsequently, 1 wk ago, developed L knee pain precluding ability to bear wt despite walker use, prompting ED presentation. Of note, she relays appearance of b/l LE skin is at bl other than posterior L knee skin tear which she states bled earlier in day. Otherwise she denies complaints.     VS significant for tachycardia HR 110s, hypertensive BP 120s-160s/80s-100    Labs c/f leukocytosis 12.45, normocytic (MCV 84.1) anemia H/H 10.8/37.1, thrombocytosis 464; abn coags (PT 16.8, INR 1.55); BCx x 2 in lab     XR L knee: redemonstrated diffuse osteophytes involving the femoral condyles and tibial plateau. Redemonstrated chronic dislocation of the patella with large osteophytes and remodeling.     VA duplex LE vein scan b/l: small R Hooks's cyst, limited study though no e/o DVT in visualized segments     Received morphine 4mg IV x 2, vanc 1g IV, zosyn 3.375g IV   (10 Dec 2023 05:01)        N/V/D,  BM/ Flatus,   NGT,     palp/ sob/dyspnea/ cp,       F/C/S  Wound consult requested by team to assist w/ management of      wound/ pressure injury.   Pt (unable to)  c/o pain, drainage, odor, color change,  or worsening swelling. Offloading and pericare initiated upon admission as pt Increasingly sedentary 2/2 to illness. Pt is Incontinent of urine & stool. (+)mendez/ ostomy.   No h/o bites, scratches, falls, trauma.  Pt seen by Wound RN  CAV[facility] Advance/  Nick,TRIAD/ Rocioell/ medihoney/ Allevyn foam/ dakins/ Adaptic/ DSD recommended used at home/ while awaiting consult.  Appetite good/ decreased.  weight loss.  S&S / RD consult appreciated All questions asked and answered to pt's and family's expressed understanding and satisfaction.    Current Diet: Diet, DASH/TLC:   Sodium & Cholesterol Restricted (12-10-23 @ 17:49)  Diet, DASH/TLC:   Sodium & Cholesterol Restricted  Consistent Carbohydrate Evening Snack (CSTCHOSN) (12-10-23 @ 17:48)      PAST MEDICAL & SURGICAL HISTORY:  Lymphedema      HTN (hypertension)      Hypothyroidism      Morbid obesity      Chronic atrial fibrillation      Septic arthritis of knee, left      S/P appendectomy      H/O Spinal surgery      H/O left knee surgery          REVIEW OF SYSTEMS: Pt unable to offer  General/ Breast/ Skin/Vasc/ Neuro/ MSK: see HPI  All other systems negative    MEDICATIONS  (STANDING):  atorvastatin 10 milliGRAM(s) Oral at bedtime  cefepime   IVPB 2000 milliGRAM(s) IV Intermittent every 12 hours  chlorhexidine 2% Cloths 1 Application(s) Topical daily  DULoxetine 60 milliGRAM(s) Oral daily  furosemide    Tablet 20 milliGRAM(s) Oral daily  heparin   Injectable 5000 Unit(s) SubCutaneous every 8 hours  levothyroxine 25 MICROGram(s) Oral daily  metoprolol tartrate 12.5 milliGRAM(s) Oral two times a day  nystatin Powder 1 Application(s) Topical two times a day  vancomycin  IVPB 2000 milliGRAM(s) IV Intermittent every 12 hours    MEDICATIONS  (PRN):  acetaminophen     Tablet .. 650 milliGRAM(s) Oral every 6 hours PRN Temp greater or equal to 38C (100.4F), Mild Pain (1 - 3)  morphine  - Injectable 1 milliGRAM(s) IV Push every 6 hours PRN Severe Pain (7 - 10)  oxyCODONE    IR 5 milliGRAM(s) Oral every 6 hours PRN Severe Pain (7 - 10)      Allergies    No Known Allergies    Intolerances        SOCIAL HISTORY:  / /single/ ; (+)HHA/ lives in SNF; Former smoker, No current/ Denies smoking, ETOH, drugs    FAMILY HISTORY:  FH: atrial fibrillation (Father)    FH: arthritis (Mother)     no h/o PVD or wound healing or skin/ significant problems    PHYSICAL EXAM:  Vital Signs Last 24 Hrs  T(C): 36.6 (12 Dec 2023 13:05), Max: 37 (11 Dec 2023 19:39)  T(F): 97.9 (12 Dec 2023 13:05), Max: 98.6 (11 Dec 2023 19:39)  HR: 109 (12 Dec 2023 09:08) (106 - 115)  BP: 135/79 (12 Dec 2023 13:05) (120/70 - 135/79)  BP(mean): --  RR: 18 (12 Dec 2023 13:05) (18 - 20)  SpO2: 98% (12 Dec 2023 13:05) (94% - 98%)    Parameters below as of 12 Dec 2023 13:05  Patient On (Oxygen Delivery Method): room air        NAD, Guarded but stable,  A&Ox3/ Alert/ Confused  cachectic/ thin, MO/ Obese, frail,  WD/ WN/ WG,  Disheveled  Total Care Sport/ Versa Care P500 / Envella Progressa bed     HEENT:  NC/AT, PERRL, EOMI, sclera clear, mucosa moist, throat clear, trachea midline, neck supple, trach  Respiratory: nonlabored w/ equal chest rise  Gastrointestinal: soft NT/ND (+)BS  (+)PEG (+)ostomy (+)NGT  : (+)mendez/ purewick/ condom cath  Neurology:  weakened strength & sensation grossly intact, paraesthesia  nonverbal, no follow commands, paraplegic  Psych: calm/ appropriate/ flat affect/ easily agitated/ restless/ anxious/ difficult to assess  Musculoskeletal:  limited stiff / p/FROM, no deformities/ contractures  Vascular: BLE equally warm/ cool,  no cyanosis, clubbing, edema nor acute ischemia           >LE //BLE edema equal           BLE DP/PT pulses palpable          BLE hemosiderin staining/ varicose veins  Skin:  moist w/ good turgor  thin, dry, pale, frail,  ecchymosis w/o hematoma  blistering  or serosanguinous drainage  No odor, erythema, increased warmth, tenderness, induration, fluctuance, nor crepitus    LABS/ CULTURES/ RADIOLOGY:                        11.3   9.73  )-----------( 443      ( 12 Dec 2023 06:56 )             38.2       135  |  97  |  12  ----------------------------<  114      [12-11-23 @ 06:14]  3.8   |  27  |  0.67        Ca     9.1     [12-11-23 @ 06:14]      Phos  3.7     [12-10-23 @ 07:04]      PT/INR: PT 15.3 , INR 1.40       [12-11-23 @ 10:10]  PTT: 36.6       [12-11-23 @ 10:10]              Culture - Blood (collected 12-10-23 @ 20:59)  Source: .Blood Blood  Preliminary Report (12-12-23 @ 02:02):    No growth at 24 hours    Culture - Blood (collected 12-10-23 @ 18:48)  Source: .Blood Blood  Preliminary Report (12-11-23 @ 22:03):    No growth at 24 hours    Culture - Blood (collected 12-09-23 @ 17:54)  Source: .Blood Blood-Peripheral  Gram Stain (12-10-23 @ 22:16):    Growth in aerobic bottle: Gram Positive Cocci in Clusters    Growth in anaerobic bottle: Gram Positive Cocci in Clusters  Final Report (12-11-23 @ 22:08):    Growth in aerobic and anaerobic bottles: Staphylococcus epidermidis    Isolation of Coagulase negative Staphylococcus from single blood culture    sets may represent    contamination. Contact the Microbiology Department at [phone] if    susceptibilitytesting is    clinically indicated.    Direct identification is available within approximately 3-5    hours either by Blood Panel Multiplexed PCR or Direct    MALDI-TOF. Details: [email or web address]  Organism: Blood Culture PCR (12-11-23 @ 22:08)  Organism: Blood Culture PCR (12-11-23 @ 22:08)      Method Type: PCR      -  Staphylococcus epidermidis, Methicillin resistant: Detec    Culture - Blood (collected 12-09-23 @ 17:22)  Source: .Blood Blood-Peripheral  Preliminary Report (12-11-23 @ 23:02):    No growth at 48 Hours            < from: VA Duplex Lower Ext Vein Scan, Bilat (12.09.23 @ 18:54) >  ACC: [id number] EXAM:  DUPLEX SCAN EXT VEINS LOWER BI   ORDERED BY: RIDGE TAI     PROCEDURE DATE:  12/09/2023          INTERPRETATION:  CLINICAL INFORMATION: 73-year-old with left lower   extremity swelling. Assess for DVT    COMPARISON: None available.    TECHNIQUE: Duplex sonography of the BILATERAL LOWER extremity veins with   color and spectral Doppler. Limited compressions were obtained due to   patient pain, swelling and body habitus.    FINDINGS:    RIGHT:  Normal compressibility ofthe RIGHT common femoral, femoral and popliteal   veins.  Doppler examination shows normal spontaneous and phasic flow.  The calf veins were not visualized.    LEFT:  Normal compressibility of the LEFT common femoral, femoral and popliteal   veins. The femoral vein at the distal thigh level was not visualized.  Doppler examination shows normal spontaneous and phasic flow.  No LEFT calf vein thrombosis is detected.    A small right Baker's cyst is seen measuring 4.6 x 1.4 cm.    IMPRESSION: Limited study due to inability to obtain venous compressions   at all levels due to patient pain, swelling and body habitus.    No evidence of DVT in the visualized venous segments. The right calf   veins were not visualized. The left femoral vein at the distal thigh   level was also not seen.     Wound SURGERY CONSULT NOTE    HPI:  73F morbidly obese, chronic b/l lymphedema, HTN, HLD, hypothyroidism, AFib, ambulates w/ walker at bl, admit 6/2021 for septic L knee s/p washout c/c/b S aureus bacteremia, now p/w L knee pain and resultant inability to bear wt. Pt relays bl health when 11/24 experienced fever tmax 102F accompanied by diffuse myalgias which abated after 12hrs. Was evaluated by home visit Dr. Bender 11/28 who obtained labs, the results of which were relayed to her 12/2 as revealing of a "bad infection" though pt unclear as to source, was prescribed levaquin for this. Subsequently, 1 wk ago, developed L knee pain precluding ability to bear wt despite walker use, prompting ED presentation. Of note, she relays appearance of b/l LE skin is at bl other than posterior L knee skin tear which she states bled earlier in day. Otherwise she denies complaints.     VS significant for tachycardia HR 110s, hypertensive BP 120s-160s/80s-100    Labs c/f leukocytosis 12.45, normocytic (MCV 84.1) anemia H/H 10.8/37.1, thrombocytosis 464; abn coags (PT 16.8, INR 1.55); BCx x 2 in lab     XR L knee: redemonstrated diffuse osteophytes involving the femoral condyles and tibial plateau. Redemonstrated chronic dislocation of the patella with large osteophytes and remodeling.     VA duplex LE vein scan b/l: small R Hooks's cyst, limited study though no e/o DVT in visualized segments     Received morphine 4mg IV x 2, vanc 1g IV, zosyn 3.375g IV   (10 Dec 2023 05:01)        N/V/D,  BM/ Flatus,   NGT,     palp/ sob/dyspnea/ cp,       F/C/S  Wound consult requested by team to assist w/ management of      wound/ pressure injury.   Pt (unable to)  c/o pain, drainage, odor, color change,  or worsening swelling. Offloading and pericare initiated upon admission as pt Increasingly sedentary 2/2 to illness. Pt is Incontinent of urine & stool. (+)mendez/ ostomy.   No h/o bites, scratches, falls, trauma.  Pt seen by Wound RN  [facility] Advance/  Nick,TRIAD/ Rocioell/ medihoney/ Allevyn foam/ dakins/ Adaptic/ DSD recommended used at home/ while awaiting consult.  Appetite good/ decreased.  weight loss.  S&S / RD consult appreciated All questions asked and answered to pt's and family's expressed understanding and satisfaction.    Current Diet: Diet, DASH/TLC:   Sodium & Cholesterol Restricted (12-10-23 @ 17:49)  Diet, DASH/TLC:   Sodium & Cholesterol Restricted  Consistent Carbohydrate Evening Snack (CSTCHOSN) (12-10-23 @ 17:48)      PAST MEDICAL & SURGICAL HISTORY:  Lymphedema      HTN (hypertension)      Hypothyroidism      Morbid obesity      Chronic atrial fibrillation      Septic arthritis of knee, left      S/P appendectomy      H/O Spinal surgery      H/O left knee surgery          REVIEW OF SYSTEMS: Pt unable to offer  General/ Breast/ Skin/Vasc/ Neuro/ MSK: see HPI  All other systems negative    MEDICATIONS  (STANDING):  atorvastatin 10 milliGRAM(s) Oral at bedtime  cefepime   IVPB 2000 milliGRAM(s) IV Intermittent every 12 hours  chlorhexidine 2% Cloths 1 Application(s) Topical daily  DULoxetine 60 milliGRAM(s) Oral daily  furosemide    Tablet 20 milliGRAM(s) Oral daily  heparin   Injectable 5000 Unit(s) SubCutaneous every 8 hours  levothyroxine 25 MICROGram(s) Oral daily  metoprolol tartrate 12.5 milliGRAM(s) Oral two times a day  nystatin Powder 1 Application(s) Topical two times a day  vancomycin  IVPB 2000 milliGRAM(s) IV Intermittent every 12 hours    MEDICATIONS  (PRN):  acetaminophen     Tablet .. 650 milliGRAM(s) Oral every 6 hours PRN Temp greater or equal to 38C (100.4F), Mild Pain (1 - 3)  morphine  - Injectable 1 milliGRAM(s) IV Push every 6 hours PRN Severe Pain (7 - 10)  oxyCODONE    IR 5 milliGRAM(s) Oral every 6 hours PRN Severe Pain (7 - 10)      Allergies    No Known Allergies    Intolerances        SOCIAL HISTORY:  / /single/ ; (+)HHA/ lives in SNF; Former smoker, No current/ Denies smoking, ETOH, drugs    FAMILY HISTORY:  FH: atrial fibrillation (Father)    FH: arthritis (Mother)     no h/o PVD or wound healing or skin/ significant problems    PHYSICAL EXAM:  Vital Signs Last 24 Hrs  T(C): 36.6 (12 Dec 2023 13:05), Max: 37 (11 Dec 2023 19:39)  T(F): 97.9 (12 Dec 2023 13:05), Max: 98.6 (11 Dec 2023 19:39)  HR: 109 (12 Dec 2023 09:08) (106 - 115)  BP: 135/79 (12 Dec 2023 13:05) (120/70 - 135/79)  BP(mean): --  RR: 18 (12 Dec 2023 13:05) (18 - 20)  SpO2: 98% (12 Dec 2023 13:05) (94% - 98%)    Parameters below as of 12 Dec 2023 13:05  Patient On (Oxygen Delivery Method): room air        NAD, Guarded but stable,  A&Ox3/ Alert/ Confused  cachectic/ thin, MO/ Obese, frail,  WD/ WN/ WG,  Disheveled  Total Care Sport/ Versa Care P500 / Envella Progressa bed     HEENT:  NC/AT, PERRL, EOMI, sclera clear, mucosa moist, throat clear, trachea midline, neck supple, trach  Respiratory: nonlabored w/ equal chest rise  Gastrointestinal: soft NT/ND (+)BS  (+)PEG (+)ostomy (+)NGT  : (+)mendez/ purewick/ condom cath  Neurology:  weakened strength & sensation grossly intact, paraesthesia  nonverbal, no follow commands, paraplegic  Psych: calm/ appropriate/ flat affect/ easily agitated/ restless/ anxious/ difficult to assess  Musculoskeletal:  limited stiff / p/FROM, no deformities/ contractures  Vascular: BLE equally warm/ cool,  no cyanosis, clubbing, edema nor acute ischemia           >LE //BLE edema equal           BLE DP/PT pulses palpable          BLE hemosiderin staining/ varicose veins  Skin:  moist w/ good turgor  thin, dry, pale, frail,  ecchymosis w/o hematoma  blistering  or serosanguinous drainage  No odor, erythema, increased warmth, tenderness, induration, fluctuance, nor crepitus    LABS/ CULTURES/ RADIOLOGY:                        11.3   9.73  )-----------( 443      ( 12 Dec 2023 06:56 )             38.2       135  |  97  |  12  ----------------------------<  114      [12-11-23 @ 06:14]  3.8   |  27  |  0.67        Ca     9.1     [12-11-23 @ 06:14]      Phos  3.7     [12-10-23 @ 07:04]      PT/INR: PT 15.3 , INR 1.40       [12-11-23 @ 10:10]  PTT: 36.6       [12-11-23 @ 10:10]              Culture - Blood (collected 12-10-23 @ 20:59)  Source: .Blood Blood  Preliminary Report (12-12-23 @ 02:02):    No growth at 24 hours    Culture - Blood (collected 12-10-23 @ 18:48)  Source: .Blood Blood  Preliminary Report (12-11-23 @ 22:03):    No growth at 24 hours    Culture - Blood (collected 12-09-23 @ 17:54)  Source: .Blood Blood-Peripheral  Gram Stain (12-10-23 @ 22:16):    Growth in aerobic bottle: Gram Positive Cocci in Clusters    Growth in anaerobic bottle: Gram Positive Cocci in Clusters  Final Report (12-11-23 @ 22:08):    Growth in aerobic and anaerobic bottles: Staphylococcus epidermidis    Isolation of Coagulase negative Staphylococcus from single blood culture    sets may represent    contamination. Contact the Microbiology Department at [phone] if    susceptibilitytesting is    clinically indicated.    Direct identification is available within approximately 3-5    hours either by Blood Panel Multiplexed PCR or Direct    MALDI-TOF. Details: [email or web address]  Organism: Blood Culture PCR (12-11-23 @ 22:08)  Organism: Blood Culture PCR (12-11-23 @ 22:08)      Method Type: PCR      -  Staphylococcus epidermidis, Methicillin resistant: Detec    Culture - Blood (collected 12-09-23 @ 17:22)  Source: .Blood Blood-Peripheral  Preliminary Report (12-11-23 @ 23:02):    No growth at 48 Hours            < from: VA Duplex Lower Ext Vein Scan, Bilat (12.09.23 @ 18:54) >  ACC: [id number] EXAM:  DUPLEX SCAN EXT VEINS LOWER BI   ORDERED BY: RIDGE TAI     PROCEDURE DATE:  12/09/2023          INTERPRETATION:  CLINICAL INFORMATION: 73-year-old with left lower   extremity swelling. Assess for DVT    COMPARISON: None available.    TECHNIQUE: Duplex sonography of the BILATERAL LOWER extremity veins with   color and spectral Doppler. Limited compressions were obtained due to   patient pain, swelling and body habitus.    FINDINGS:    RIGHT:  Normal compressibility ofthe RIGHT common femoral, femoral and popliteal   veins.  Doppler examination shows normal spontaneous and phasic flow.  The calf veins were not visualized.    LEFT:  Normal compressibility of the LEFT common femoral, femoral and popliteal   veins. The femoral vein at the distal thigh level was not visualized.  Doppler examination shows normal spontaneous and phasic flow.  No LEFT calf vein thrombosis is detected.    A small right Baker's cyst is seen measuring 4.6 x 1.4 cm.    IMPRESSION: Limited study due to inability to obtain venous compressions   at all levels due to patient pain, swelling and body habitus.    No evidence of DVT in the visualized venous segments. The right calf   veins were not visualized. The left femoral vein at the distal thigh   level was also not seen.     Wound SURGERY CONSULT NOTE    HPI:  73F morbidly obese, chronic b/l lymphedema, HTN, HLD, hypothyroidism, AFib, ambulates w/ walker at bl, admit 6/2021 for septic L knee s/p washout c/c/b S aureus bacteremia, now p/w L knee pain and resultant inability to bear wt. Pt relays bl health when 11/24 experienced fever tmax 102F accompanied by diffuse myalgias which abated after 12hrs. Was evaluated by home visit Dr. Bender 11/28 who obtained labs, the results of which were relayed to her 12/2 as revealing of a "bad infection" though pt unclear as to source, was prescribed levaquin for this. Subsequently, 1 wk ago, developed L knee pain precluding ability to bear wt despite walker use, prompting ED presentation. Of note, she relays appearance of b/l LE skin is at bl other than posterior L knee skin tear which she states bled earlier in day. Otherwise she denies complaints.     VS significant for tachycardia HR 110s, hypertensive BP 120s-160s/80s-100    Labs c/f leukocytosis 12.45, normocytic (MCV 84.1) anemia H/H 10.8/37.1, thrombocytosis 464; abn coags (PT 16.8, INR 1.55); BCx x 2 in lab     XR L knee: redemonstrated diffuse osteophytes involving the femoral condyles and tibial plateau. Redemonstrated chronic dislocation of the patella with large osteophytes and remodeling.     VA duplex LE vein scan b/l: small R Hooks's cyst, limited study though no e/o DVT in visualized segments     Received morphine 4mg IV x 2, vanc 1g IV, zosyn 3.375g IV    Wound consult requested by team to assist w/ management of lymphedema and skin rash.   Pt leg swelling & difficulty walking more than a few steps at a time w/ walker before knee pain started.  Pt w/ wet feeling and itchiness in skin folds for a long time.  nothing has helped.  Pt hasn't followed up with lymphedema treatment.  no compression.   c/o pain, drainage, odor, color change. Offloading and pericare initiated upon admission. Pt is Incontinent of urine & stool.  No h/o bites, scratches, falls, trauma.  Appetite good w/o.  weight loss.  All questions asked and answered to pt's expressed understanding and satisfaction.    Current Diet: Diet, DASH/TLC:   Sodium & Cholesterol Restricted (12-10-23 @ 17:49)  Diet, DASH/TLC:   Sodium & Cholesterol Restricted  Consistent Carbohydrate Evening Snack (CSTCHOSN) (12-10-23 @ 17:48)      PAST MEDICAL & SURGICAL HISTORY:  Lymphedema      HTN (hypertension)      Hypothyroidism      Morbid obesity      Chronic atrial fibrillation      Septic arthritis of knee, left      S/P appendectomy      s/p spinal surgery      s/p left knee surgery          REVIEW OF SYSTEMS:   General/ Breast/ Skin/Vasc/ MSK: see HPI  All other systems negative    MEDICATIONS  (STANDING):  atorvastatin 10 milliGRAM(s) Oral at bedtime  cefepime   IVPB 2000 milliGRAM(s) IV Intermittent every 12 hours  chlorhexidine 2% Cloths 1 Application(s) Topical daily  DULoxetine 60 milliGRAM(s) Oral daily  furosemide    Tablet 20 milliGRAM(s) Oral daily  heparin   Injectable 5000 Unit(s) SubCutaneous every 8 hours  levothyroxine 25 MICROGram(s) Oral daily  metoprolol tartrate 12.5 milliGRAM(s) Oral two times a day  nystatin Powder 1 Application(s) Topical two times a day  vancomycin  IVPB 2000 milliGRAM(s) IV Intermittent every 12 hours    MEDICATIONS  (PRN):  acetaminophen     Tablet .. 650 milliGRAM(s) Oral every 6 hours PRN Temp greater or equal to 38C (100.4F), Mild Pain (1 - 3)  morphine  - Injectable 1 milliGRAM(s) IV Push every 6 hours PRN Severe Pain (7 - 10)  oxyCODONE    IR 5 milliGRAM(s) Oral every 6 hours PRN Severe Pain (7 - 10)      No Known Allergies    SOCIAL HISTORY: single; (+)HHA/  Denies smoking, ETOH, drugs    FAMILY HISTORY: atrial fibrillation (Father) & arthritis (Mother)     no h/o PVD or wound healing or skin problems    PHYSICAL EXAM:  Vital Signs Last 24 Hrs  T(C): 36.6 (12 Dec 2023 13:05), Max: 37 (11 Dec 2023 19:39)  T(F): 97.9 (12 Dec 2023 13:05), Max: 98.6 (11 Dec 2023 19:39)  HR: 109 (12 Dec 2023 09:08) (106 - 115)  BP: 135/79 (12 Dec 2023 13:05) (120/70 - 135/79)  BP(mean): --  RR: 18 (12 Dec 2023 13:05) (18 - 20)  SpO2: 98% (12 Dec 2023 13:05) (94% - 98%)    Parameters below as of 12 Dec 2023 13:05  Patient On (Oxygen Delivery Method): room air      NAD,  A&Ox3, MO  WD/ WN/ WG  Versa Care P500 bed  HEENT:  NC/AT, EOMI, sclera clear, mucosa moist, throat clear, trachea midline, neck supple  Respiratory: nonlabored w/ equal chest rise  Gastrointestinal: soft NT/ND   Neurology:  strength & sensation grossly intact,  Psych: calm/ appropriate  Musculoskeletal: FROM, no deformities/ contractures  Vascular: BLE equally warm,  no cyanosis, clubbing, nor acute ischemia        BLE lymphedema equal           BLE no DP/PT pulses palpable  Skin:  moist w/ good turgor  Bilateral buttocks w/ hyperpigmentation of moisture  Bilateral groin, skin creases of leg, Breast folds, and axillary folds     w/ deep red erythema moist skin w/o open skin or blistering   Lt thigh skin abrasion  no blistering  or drainage  No odor, erythema, increased warmth, tenderness, induration, fluctuance, nor crepitus    LABS/ CULTURES/ RADIOLOGY:                        11.3   9.73  )-----------( 443      ( 12 Dec 2023 06:56 )             38.2       135  |  97  |  12  ----------------------------<  114      [12-11-23 @ 06:14]  3.8   |  27  |  0.67        Ca     9.1     [12-11-23 @ 06:14]      Phos  3.7     [12-10-23 @ 07:04]      PT/INR: PT 15.3 , INR 1.40       [12-11-23 @ 10:10]  PTT: 36.6       [12-11-23 @ 10:10]              Culture - Blood (collected 12-10-23 @ 20:59)  Source: .Blood Blood  Preliminary Report (12-12-23 @ 02:02):    No growth at 24 hours    Culture - Blood (collected 12-10-23 @ 18:48)  Source: .Blood Blood  Preliminary Report (12-11-23 @ 22:03):    No growth at 24 hours    Culture - Blood (collected 12-09-23 @ 17:54)  Source: .Blood Blood-Peripheral  Gram Stain (12-10-23 @ 22:16):    Growth in aerobic bottle: Gram Positive Cocci in Clusters    Growth in anaerobic bottle: Gram Positive Cocci in Clusters  Final Report (12-11-23 @ 22:08):    Growth in aerobic and anaerobic bottles: Staphylococcus epidermidis    Isolation of Coagulase negative Staphylococcus from single blood culture    sets may represent    contamination. Contact the Microbiology Department at [phone] if    susceptibilitytesting is    clinically indicated.    Direct identification is available within approximately 3-5    hours either by Blood Panel Multiplexed PCR or Direct    MALDI-TOF. Details: [email or web address]  Organism: Blood Culture PCR (12-11-23 @ 22:08)  Organism: Blood Culture PCR (12-11-23 @ 22:08)      Method Type: PCR      -  Staphylococcus epidermidis, Methicillin resistant: Detec    Culture - Blood (collected 12-09-23 @ 17:22)  Source: .Blood Blood-Peripheral  Preliminary Report (12-11-23 @ 23:02):    No growth at 48 Hours            < from: [facility] Duplex Lower Ext Vein Scan, Bilat (12.09.23 @ 18:54) >  ACC: [id number] EXAM:  DUPLEX SCAN EXT VEINS LOWER BI   ORDERED BY: RIDGE TAI     PROCEDURE DATE:  12/09/2023          INTERPRETATION:  CLINICAL INFORMATION: 73-year-old with left lower   extremity swelling. Assess for DVT    COMPARISON: None available.    TECHNIQUE: Duplex sonography of the BILATERAL LOWER extremity veins with   color and spectral Doppler. Limited compressions were obtained due to   patient pain, swelling and body habitus.    FINDINGS:    RIGHT:  Normal compressibility ofthe RIGHT common femoral, femoral and popliteal   veins.  Doppler examination shows normal spontaneous and phasic flow.  The calf veins were not visualized.    LEFT:  Normal compressibility of the LEFT common femoral, femoral and popliteal   veins. The femoral vein at the distal thigh level was not visualized.  Doppler examination shows normal spontaneous and phasic flow.  No LEFT calf vein thrombosis is detected.    A small right Baker's cyst is seen measuring 4.6 x 1.4 cm.    IMPRESSION: Limited study due to inability to obtain venous compressions   at all levels due to patient pain, swelling and body habitus.    No evidence of DVT in the visualized venous segments. The right calf   veins were not visualized. The left femoral vein at the distal thigh   level was also not seen.     Wound SURGERY CONSULT NOTE    HPI:  73F morbidly obese, chronic b/l lymphedema, HTN, HLD, hypothyroidism, AFib, ambulates w/ walker at bl, admit 6/2021 for septic L knee s/p washout c/c/b S aureus bacteremia, now p/w L knee pain and resultant inability to bear wt. Pt relays bl health when 11/24 experienced fever tmax 102F accompanied by diffuse myalgias which abated after 12hrs. Was evaluated by home visit Dr. Bender 11/28 who obtained labs, the results of which were relayed to her 12/2 as revealing of a "bad infection" though pt unclear as to source, was prescribed levaquin for this. Subsequently, 1 wk ago, developed L knee pain precluding ability to bear wt despite walker use, prompting ED presentation. Of note, she relays appearance of b/l LE skin is at bl other than posterior L knee skin tear which she states bled earlier in day. Otherwise she denies complaints.     VS significant for tachycardia HR 110s, hypertensive BP 120s-160s/80s-100    Labs c/f leukocytosis 12.45, normocytic (MCV 84.1) anemia H/H 10.8/37.1, thrombocytosis 464; abn coags (PT 16.8, INR 1.55); BCx x 2 in lab     XR L knee: redemonstrated diffuse osteophytes involving the femoral condyles and tibial plateau. Redemonstrated chronic dislocation of the patella with large osteophytes and remodeling.     VA duplex LE vein scan b/l: small R Hooks's cyst, limited study though no e/o DVT in visualized segments     Received morphine 4mg IV x 2, vanc 1g IV, zosyn 3.375g IV    Wound consult requested by team to assist w/ management of lymphedema and skin rash.   Pt leg swelling & difficulty walking more than a few steps at a time w/ walker before knee pain started.  Pt w/ wet feeling and itchiness in skin folds for a long time.  nothing has helped.  Pt hasn't followed up with lymphedema treatment.  no compression.   c/o pain, drainage, odor, color change. Offloading and pericare initiated upon admission. Pt is Incontinent of urine & stool.  No h/o bites, scratches, falls, trauma.  Appetite good w/o.  weight loss.  All questions asked and answered to pt's expressed understanding and satisfaction.    Current Diet: Diet, DASH/TLC:   Sodium & Cholesterol Restricted (12-10-23 @ 17:49)  Diet, DASH/TLC:   Sodium & Cholesterol Restricted  Consistent Carbohydrate Evening Snack (CSTCHOSN) (12-10-23 @ 17:48)      PAST MEDICAL & SURGICAL HISTORY:  Lymphedema      HTN (hypertension)      Hypothyroidism      Morbid obesity      Chronic atrial fibrillation      Septic arthritis of knee, left      S/P appendectomy      s/p spinal surgery      s/p left knee surgery          REVIEW OF SYSTEMS:   General/ Breast/ Skin/Vasc/ MSK: see HPI  All other systems negative    MEDICATIONS  (STANDING):  atorvastatin 10 milliGRAM(s) Oral at bedtime  cefepime   IVPB 2000 milliGRAM(s) IV Intermittent every 12 hours  chlorhexidine 2% Cloths 1 Application(s) Topical daily  DULoxetine 60 milliGRAM(s) Oral daily  furosemide    Tablet 20 milliGRAM(s) Oral daily  heparin   Injectable 5000 Unit(s) SubCutaneous every 8 hours  levothyroxine 25 MICROGram(s) Oral daily  metoprolol tartrate 12.5 milliGRAM(s) Oral two times a day  nystatin Powder 1 Application(s) Topical two times a day  vancomycin  IVPB 2000 milliGRAM(s) IV Intermittent every 12 hours    MEDICATIONS  (PRN):  acetaminophen     Tablet .. 650 milliGRAM(s) Oral every 6 hours PRN Temp greater or equal to 38C (100.4F), Mild Pain (1 - 3)  morphine  - Injectable 1 milliGRAM(s) IV Push every 6 hours PRN Severe Pain (7 - 10)  oxyCODONE    IR 5 milliGRAM(s) Oral every 6 hours PRN Severe Pain (7 - 10)      No Known Allergies    SOCIAL HISTORY: single; (+)HHA/  Denies smoking, ETOH, drugs    FAMILY HISTORY: atrial fibrillation (Father) & arthritis (Mother)     no h/o PVD or wound healing or skin problems    PHYSICAL EXAM:  Vital Signs Last 24 Hrs  T(C): 36.6 (12 Dec 2023 13:05), Max: 37 (11 Dec 2023 19:39)  T(F): 97.9 (12 Dec 2023 13:05), Max: 98.6 (11 Dec 2023 19:39)  HR: 109 (12 Dec 2023 09:08) (106 - 115)  BP: 135/79 (12 Dec 2023 13:05) (120/70 - 135/79)  BP(mean): --  RR: 18 (12 Dec 2023 13:05) (18 - 20)  SpO2: 98% (12 Dec 2023 13:05) (94% - 98%)    Parameters below as of 12 Dec 2023 13:05  Patient On (Oxygen Delivery Method): room air      NAD,  A&Ox3, MO  WD/ WN/ WG  Versa Care P500 bed  HEENT:  NC/AT, EOMI, sclera clear, mucosa moist, throat clear, trachea midline, neck supple  Respiratory: nonlabored w/ equal chest rise  Gastrointestinal: soft NT/ND   Neurology:  strength & sensation grossly intact,  Psych: calm/ appropriate  Musculoskeletal: FROM, no deformities/ contractures  Vascular: BLE equally warm,  no cyanosis, clubbing, nor acute ischemia        BLE lymphedema equal           BLE no DP/PT pulses palpable  Skin:  moist w/ good turgor  Bilateral buttocks w/ hyperpigmentation of moisture  Bilateral groin, skin creases of leg, Breast folds, and axillary folds     w/ deep red erythema moist skin w/o open skin or blistering   Lt thigh skin abrasion  no blistering  or drainage  No odor, erythema, increased warmth, tenderness, induration, fluctuance, nor crepitus    LABS/ CULTURES/ RADIOLOGY:                        11.3   9.73  )-----------( 443      ( 12 Dec 2023 06:56 )             38.2       135  |  97  |  12  ----------------------------<  114      [12-11-23 @ 06:14]  3.8   |  27  |  0.67        Ca     9.1     [12-11-23 @ 06:14]      Phos  3.7     [12-10-23 @ 07:04]      PT/INR: PT 15.3 , INR 1.40       [12-11-23 @ 10:10]  PTT: 36.6       [12-11-23 @ 10:10]              Culture - Blood (collected 12-10-23 @ 20:59)  Source: .Blood Blood  Preliminary Report (12-12-23 @ 02:02):    No growth at 24 hours    Culture - Blood (collected 12-10-23 @ 18:48)  Source: .Blood Blood  Preliminary Report (12-11-23 @ 22:03):    No growth at 24 hours    Culture - Blood (collected 12-09-23 @ 17:54)  Source: .Blood Blood-Peripheral  Gram Stain (12-10-23 @ 22:16):    Growth in aerobic bottle: Gram Positive Cocci in Clusters    Growth in anaerobic bottle: Gram Positive Cocci in Clusters  Final Report (12-11-23 @ 22:08):    Growth in aerobic and anaerobic bottles: Staphylococcus epidermidis    Isolation of Coagulase negative Staphylococcus from single blood culture    sets may represent    contamination. Contact the Microbiology Department at [phone] if    susceptibilitytesting is    clinically indicated.    Direct identification is available within approximately 3-5    hours either by Blood Panel Multiplexed PCR or Direct    MALDI-TOF. Details: [email or web address]  Organism: Blood Culture PCR (12-11-23 @ 22:08)  Organism: Blood Culture PCR (12-11-23 @ 22:08)      Method Type: PCR      -  Staphylococcus epidermidis, Methicillin resistant: Detec    Culture - Blood (collected 12-09-23 @ 17:22)  Source: .Blood Blood-Peripheral  Preliminary Report (12-11-23 @ 23:02):    No growth at 48 Hours            < from: [facility] Duplex Lower Ext Vein Scan, Bilat (12.09.23 @ 18:54) >  ACC: [id number] EXAM:  DUPLEX SCAN EXT VEINS LOWER BI   ORDERED BY: RIDGE TAI     PROCEDURE DATE:  12/09/2023          INTERPRETATION:  CLINICAL INFORMATION: 73-year-old with left lower   extremity swelling. Assess for DVT    COMPARISON: None available.    TECHNIQUE: Duplex sonography of the BILATERAL LOWER extremity veins with   color and spectral Doppler. Limited compressions were obtained due to   patient pain, swelling and body habitus.    FINDINGS:    RIGHT:  Normal compressibility ofthe RIGHT common femoral, femoral and popliteal   veins.  Doppler examination shows normal spontaneous and phasic flow.  The calf veins were not visualized.    LEFT:  Normal compressibility of the LEFT common femoral, femoral and popliteal   veins. The femoral vein at the distal thigh level was not visualized.  Doppler examination shows normal spontaneous and phasic flow.  No LEFT calf vein thrombosis is detected.    A small right Baker's cyst is seen measuring 4.6 x 1.4 cm.    IMPRESSION: Limited study due to inability to obtain venous compressions   at all levels due to patient pain, swelling and body habitus.    No evidence of DVT in the visualized venous segments. The right calf   veins were not visualized. The left femoral vein at the distal thigh   level was also not seen.

## 2023-12-12 NOTE — PROGRESS NOTE ADULT - ASSESSMENT
73F morbidly obese, chronic b/l lymphedema, HTN, HLD, hypothyroidism, AFib on Inez , ambulates w/ walker at bl, admit 6/2021 for septic L knee washout w r/o L septic knee      Septic Joint   Bacteremia    CT small joint effusion, synovitis; tricompartmental OA, edema/skin thickening  IR aspiration--serosanguinous drainage, 13 WBC, 8000 RBC  BCX 2/4 Staph epi,   Synovial culture pending      Overall, Fever, leukocytosis, knee pain  - Cefepime 2g q 12  - Vanco 2g q 12   - F/U BCXs x no growth x 24 hours     ID consult appreciated     PT

## 2023-12-12 NOTE — PROGRESS NOTE ADULT - ASSESSMENT
74 yo M lymphedema, prior L knee septic knee s/p washout, staph bacteremia--no apparent hardware, here with L knee pain  No documented fevers, leukocytosis  L knee pain, fever as outpatient  CT small joint effusion, synovitis; tricompartmental OA, edema/skin thickening  IR aspiration--serosanguinous drainage, 13 WBC, 8000 RBC  BCX 2/4 Staph epi, repeats pending  Synovial culture pending  Based on arthrocentesis, lower suspicion septic joint  BCX result contam? Repeat BCXs NGTD  Suspect noninfectious presentation, and L knee due to severe OA  Overall, Fever, leukocytosis, knee pain  - DC Vanco/Cefepime, monitor off abx for now (low threshold to resume if worsening/fever)  - F/U BCXs  - Monitor for alternate sources infection  - F/U Orthopedics  - If ongoing fevers, would check CT Chest, CT A/P (with contrast if able to tolerate) to evaluate for alternate source    My colleagues will be covering this patient starting on 12/13/23, I will return 12/14/23. Please call [phone] or on call fellow with any questions or change in status.     Noman Dior MD  Contact on TEAMS messaging from 9am - 5pm  From 5pm-9am, on weekends, or if no response call [phone] 72 yo M lymphedema, prior L knee septic knee s/p washout, staph bacteremia--no apparent hardware, here with L knee pain  No documented fevers, leukocytosis  L knee pain, fever as outpatient  CT small joint effusion, synovitis; tricompartmental OA, edema/skin thickening  IR aspiration--serosanguinous drainage, 13 WBC, 8000 RBC  BCX 2/4 Staph epi, repeats pending  Synovial culture pending  Based on arthrocentesis, lower suspicion septic joint  BCX result contam? Repeat BCXs NGTD  Suspect noninfectious presentation, and L knee due to severe OA  Overall, Fever, leukocytosis, knee pain  - DC Vanco/Cefepime, monitor off abx for now (low threshold to resume if worsening/fever)  - F/U BCXs  - Monitor for alternate sources infection  - F/U Orthopedics  - If ongoing fevers, would check CT Chest, CT A/P (with contrast if able to tolerate) to evaluate for alternate source    My colleagues will be covering this patient starting on 12/13/23, I will return 12/14/23. Please call [phone] or on call fellow with any questions or change in status.     Noman Dior MD  Contact on TEAMS messaging from 9am - 5pm  From 5pm-9am, on weekends, or if no response call [phone]

## 2023-12-12 NOTE — PROGRESS NOTE ADULT - SUBJECTIVE AND OBJECTIVE BOX
Patient is a 73y old  Female who presents with a chief complaint of L knee pain (11 Dec 2023 13:55)      SUBJECTIVE / OVERNIGHT EVENTS: no events     MEDICATIONS  (STANDING):  atorvastatin 10 milliGRAM(s) Oral at bedtime  cefepime   IVPB 2000 milliGRAM(s) IV Intermittent every 12 hours  chlorhexidine 2% Cloths 1 Application(s) Topical daily  DULoxetine 60 milliGRAM(s) Oral daily  furosemide    Tablet 20 milliGRAM(s) Oral daily  heparin   Injectable 5000 Unit(s) SubCutaneous every 8 hours  levothyroxine 25 MICROGram(s) Oral daily  metoprolol tartrate 12.5 milliGRAM(s) Oral two times a day  vancomycin  IVPB 2000 milliGRAM(s) IV Intermittent every 12 hours    MEDICATIONS  (PRN):  acetaminophen     Tablet .. 650 milliGRAM(s) Oral every 6 hours PRN Temp greater or equal to 38C (100.4F), Mild Pain (1 - 3)  morphine  - Injectable 1 milliGRAM(s) IV Push every 6 hours PRN Severe Pain (7 - 10)  oxyCODONE    IR 5 milliGRAM(s) Oral every 6 hours PRN Severe Pain (7 - 10)      CAPILLARY BLOOD GLUCOSE        I&O's Summary    10 Dec 2023 07:01  -  11 Dec 2023 07:00  --------------------------------------------------------  IN: 120 mL / OUT: 0 mL / NET: 120 mL    11 Dec 2023 07:01  -  11 Dec 2023 20:24  --------------------------------------------------------  IN: 1110 mL / OUT: 300 mL / NET: 810 mL      T(C): 36.6 (12-12-23 @ 13:05), Max: 36.6 (12-12-23 @ 06:33)  HR: 109 (12-12-23 @ 09:08) (106 - 109)  BP: 135/79 (12-12-23 @ 13:05) (126/74 - 135/79)  RR: 18 (12-12-23 @ 13:05) (18 - 18)  SpO2: 98% (12-12-23 @ 13:05) (97% - 98%)    PHYSICAL EXAM:  GENERAL: NAD, well-developed  HEAD:  Atraumatic, Normocephalic  EYES: EOMI, PERRLA, conjunctiva and sclera clear  NECK: Supple, No JVD  CHEST/LUNG: Clear to auscultation bilaterally; No wheeze  HEART: Regular rate and rhythm; No murmurs, rubs, or gallops  ABDOMEN: Soft, Nontender, Nondistended; Bowel sounds present  EXTREMITIES:  Left knee swollen tender   PSYCH: AAOx3  NEUROLOGY: non-focal  SKIN: No rashes or lesions                              11.3   9.73  )-----------( 443      ( 12 Dec 2023 06:56 )             38.2             PT/INR - ( 11 Dec 2023 10:10 )   PT: 15.3 sec;   INR: 1.40 ratio         PTT - ( 11 Dec 2023 10:10 )  PTT:36.6 sec    RADIOLOGY & ADDITIONAL TESTS:    Imaging Personally Reviewed:    Consultant(s) Notes Reviewed:      Care Discussed with Consultants/Other Providers:

## 2023-12-13 RX ORDER — APIXABAN 2.5 MG/1
5 TABLET, FILM COATED ORAL EVERY 12 HOURS
Refills: 0 | Status: DISCONTINUED | OUTPATIENT
Start: 2023-12-13 | End: 2023-12-15

## 2023-12-13 RX ADMIN — Medication 650 MILLIGRAM(S): at 17:47

## 2023-12-13 RX ADMIN — ATORVASTATIN CALCIUM 10 MILLIGRAM(S): 80 TABLET, FILM COATED ORAL at 22:40

## 2023-12-13 RX ADMIN — Medication 20 MILLIGRAM(S): at 05:38

## 2023-12-13 RX ADMIN — OXYCODONE HYDROCHLORIDE 5 MILLIGRAM(S): 5 TABLET ORAL at 23:48

## 2023-12-13 RX ADMIN — Medication 650 MILLIGRAM(S): at 12:59

## 2023-12-13 RX ADMIN — OXYCODONE HYDROCHLORIDE 5 MILLIGRAM(S): 5 TABLET ORAL at 11:26

## 2023-12-13 RX ADMIN — Medication 12.5 MILLIGRAM(S): at 17:49

## 2023-12-13 RX ADMIN — CHLORHEXIDINE GLUCONATE 1 APPLICATION(S): 213 SOLUTION TOPICAL at 05:39

## 2023-12-13 RX ADMIN — OXYCODONE HYDROCHLORIDE 5 MILLIGRAM(S): 5 TABLET ORAL at 17:48

## 2023-12-13 RX ADMIN — NYSTATIN CREAM 1 APPLICATION(S): 100000 CREAM TOPICAL at 17:49

## 2023-12-13 RX ADMIN — APIXABAN 5 MILLIGRAM(S): 2.5 TABLET, FILM COATED ORAL at 17:48

## 2023-12-13 RX ADMIN — NYSTATIN CREAM 1 APPLICATION(S): 100000 CREAM TOPICAL at 05:39

## 2023-12-13 RX ADMIN — OXYCODONE HYDROCHLORIDE 5 MILLIGRAM(S): 5 TABLET ORAL at 18:54

## 2023-12-13 RX ADMIN — OXYCODONE HYDROCHLORIDE 5 MILLIGRAM(S): 5 TABLET ORAL at 09:13

## 2023-12-13 RX ADMIN — HEPARIN SODIUM 5000 UNIT(S): 5000 INJECTION INTRAVENOUS; SUBCUTANEOUS at 05:38

## 2023-12-13 RX ADMIN — Medication 25 MICROGRAM(S): at 05:38

## 2023-12-13 RX ADMIN — Medication 12.5 MILLIGRAM(S): at 05:38

## 2023-12-13 RX ADMIN — DULOXETINE HYDROCHLORIDE 60 MILLIGRAM(S): 30 CAPSULE, DELAYED RELEASE ORAL at 13:00

## 2023-12-13 NOTE — PROGRESS NOTE ADULT - SUBJECTIVE AND OBJECTIVE BOX
Knee aspirate and cultures not consistent with septic joint. patient has severe destructive arthritis.  She is not a candidate for knee arthroplasty due to lymphedema and obesity.  Strongly recommend aggreesive weight management.

## 2023-12-13 NOTE — PROGRESS NOTE ADULT - SUBJECTIVE AND OBJECTIVE BOX
Patient is a 73y old  Female who presents with a chief complaint of L knee pain (11 Dec 2023 13:55)      SUBJECTIVE / OVERNIGHT EVENTS: no events         T(C): 36.5 (12-13-23 @ 19:44), Max: 37.2 (12-13-23 @ 12:19)  HR: 102 (12-13-23 @ 19:44) (102 - 109)  BP: 100/76 (12-13-23 @ 19:44) (100/76 - 147/85)  RR: 18 (12-13-23 @ 19:44) (18 - 18)  SpO2: 95% (12-13-23 @ 19:44) (95% - 98%)      MEDICATIONS  (STANDING):  apixaban 5 milliGRAM(s) Oral every 12 hours  atorvastatin 10 milliGRAM(s) Oral at bedtime  chlorhexidine 2% Cloths 1 Application(s) Topical daily  DULoxetine 60 milliGRAM(s) Oral daily  furosemide    Tablet 20 milliGRAM(s) Oral daily  levothyroxine 25 MICROGram(s) Oral daily  metoprolol tartrate 12.5 milliGRAM(s) Oral two times a day  nystatin Powder 1 Application(s) Topical two times a day    MEDICATIONS  (PRN):  acetaminophen     Tablet .. 650 milliGRAM(s) Oral every 6 hours PRN Temp greater or equal to 38C (100.4F), Mild Pain (1 - 3)  morphine  - Injectable 1 milliGRAM(s) IV Push every 6 hours PRN Severe Pain (7 - 10)  oxyCODONE    IR 5 milliGRAM(s) Oral every 6 hours PRN Severe Pain (7 - 10)    PHYSICAL EXAM:  GENERAL: NAD, well-developed  HEAD:  Atraumatic, Normocephalic  EYES: EOMI, PERRLA, conjunctiva and sclera clear  NECK: Supple, No JVD  CHEST/LUNG: Clear to auscultation bilaterally; No wheeze  HEART: Regular rate and rhythm; No murmurs, rubs, or gallops  ABDOMEN: Soft, Nontender, Nondistended; Bowel sounds present  EXTREMITIES:  Left knee swollen tender   PSYCH: AAOx3  NEUROLOGY: non-focal  SKIN: No rashes or lesions                                      11.3   9.73  )-----------( 443      ( 12 Dec 2023 06:56 )             38.2               Consultant(s) Notes Reviewed:      Care Discussed with Consultants/Other Providers:

## 2023-12-13 NOTE — PROGRESS NOTE ADULT - ASSESSMENT
73F morbidly obese, chronic b/l lymphedema, HTN, HLD, hypothyroidism, AFib on Inez , ambulates w/ walker at bl, admit 6/2021 for septic L knee washout w r/o L septic knee      Septic Joint   Bacteremia    CT small joint effusion, synovitis; tricompartmental OA, edema/skin thickening  IR aspiration--serosanguinous drainage, 13 WBC, 8000 RBC  BCX 2/4 Staph epi,   Synovial culture no growth     Overall, Fever, leukocytosis, knee pain  - - F/U BCXs x no growth x 24 hours   Monitor off abx     ID consult appreciated     PT

## 2023-12-13 NOTE — PROVIDER CONTACT NOTE (OTHER) - ASSESSMENT
pt agitated, fidgety and has not been taking medications, refuses meds and afraid of being "poisoned"  family at bedside aware of her behavior and delusions

## 2023-12-13 NOTE — CHART NOTE - NSCHARTNOTEFT_GEN_A_CORE
Patient aspiration not indicative of active infection in knee joint.  Will FU on aspiration culture.  No acute orthopaedic surgical intervention indicated at this time. This patient is orthopaedically stable for discharge.   Patient to follow up with Dr. Joshua as an outpatient for further evaluation and management.   All of the patient's questions and concerns were answered and addressed.

## 2023-12-14 PROCEDURE — 93971 EXTREMITY STUDY: CPT | Mod: 26,RT

## 2023-12-14 PROCEDURE — 99232 SBSQ HOSP IP/OBS MODERATE 35: CPT

## 2023-12-14 RX ORDER — PANTOPRAZOLE SODIUM 20 MG/1
40 TABLET, DELAYED RELEASE ORAL DAILY
Refills: 0 | Status: DISCONTINUED | OUTPATIENT
Start: 2023-12-14 | End: 2023-12-15

## 2023-12-14 RX ADMIN — PANTOPRAZOLE SODIUM 40 MILLIGRAM(S): 20 TABLET, DELAYED RELEASE ORAL at 15:46

## 2023-12-14 RX ADMIN — Medication 12.5 MILLIGRAM(S): at 18:53

## 2023-12-14 RX ADMIN — OXYCODONE HYDROCHLORIDE 5 MILLIGRAM(S): 5 TABLET ORAL at 20:17

## 2023-12-14 RX ADMIN — NYSTATIN CREAM 1 APPLICATION(S): 100000 CREAM TOPICAL at 18:53

## 2023-12-14 RX ADMIN — Medication 12.5 MILLIGRAM(S): at 05:40

## 2023-12-14 RX ADMIN — APIXABAN 5 MILLIGRAM(S): 2.5 TABLET, FILM COATED ORAL at 18:53

## 2023-12-14 RX ADMIN — NYSTATIN CREAM 1 APPLICATION(S): 100000 CREAM TOPICAL at 05:41

## 2023-12-14 RX ADMIN — OXYCODONE HYDROCHLORIDE 5 MILLIGRAM(S): 5 TABLET ORAL at 19:47

## 2023-12-14 RX ADMIN — CHLORHEXIDINE GLUCONATE 1 APPLICATION(S): 213 SOLUTION TOPICAL at 05:41

## 2023-12-14 RX ADMIN — Medication 20 MILLIGRAM(S): at 05:39

## 2023-12-14 RX ADMIN — ATORVASTATIN CALCIUM 10 MILLIGRAM(S): 80 TABLET, FILM COATED ORAL at 21:29

## 2023-12-14 RX ADMIN — DULOXETINE HYDROCHLORIDE 60 MILLIGRAM(S): 30 CAPSULE, DELAYED RELEASE ORAL at 11:41

## 2023-12-14 RX ADMIN — Medication 25 MICROGRAM(S): at 05:39

## 2023-12-14 RX ADMIN — APIXABAN 5 MILLIGRAM(S): 2.5 TABLET, FILM COATED ORAL at 05:39

## 2023-12-14 RX ADMIN — OXYCODONE HYDROCHLORIDE 5 MILLIGRAM(S): 5 TABLET ORAL at 08:46

## 2023-12-14 RX ADMIN — OXYCODONE HYDROCHLORIDE 5 MILLIGRAM(S): 5 TABLET ORAL at 09:30

## 2023-12-14 NOTE — PROGRESS NOTE ADULT - SUBJECTIVE AND OBJECTIVE BOX
Patient is a 73y old  Female who presents with a chief complaint of L knee pain (11 Dec 2023 13:55)      SUBJECTIVE / OVERNIGHT EVENTS: no events     T(C): 37.3 (12-14-23 @ 19:50), Max: 37.3 (12-14-23 @ 19:50)  HR: 103 (12-14-23 @ 19:50) (103 - 110)  BP: 112/87 (12-14-23 @ 19:50) (107/80 - 131/85)  RR: 18 (12-14-23 @ 19:50) (18 - 18)  SpO2: 96% (12-14-23 @ 19:50) (95% - 96%)      MEDICATIONS  (STANDING):  apixaban 5 milliGRAM(s) Oral every 12 hours  atorvastatin 10 milliGRAM(s) Oral at bedtime  chlorhexidine 2% Cloths 1 Application(s) Topical daily  DULoxetine 60 milliGRAM(s) Oral daily  furosemide    Tablet 20 milliGRAM(s) Oral daily  levothyroxine 25 MICROGram(s) Oral daily  metoprolol tartrate 12.5 milliGRAM(s) Oral two times a day  nystatin Powder 1 Application(s) Topical two times a day  pantoprazole    Tablet 40 milliGRAM(s) Oral daily    MEDICATIONS  (PRN):  acetaminophen     Tablet .. 650 milliGRAM(s) Oral every 6 hours PRN Temp greater or equal to 38C (100.4F), Mild Pain (1 - 3)  morphine  - Injectable 1 milliGRAM(s) IV Push every 6 hours PRN Severe Pain (7 - 10)  oxyCODONE    IR 5 milliGRAM(s) Oral every 6 hours PRN Severe Pain (7 - 10)  PHYSICAL EXAM:  GENERAL: NAD, well-developed  HEAD:  Atraumatic, Normocephalic  EYES: EOMI, PERRLA, conjunctiva and sclera clear  NECK: Supple, No JVD  CHEST/LUNG: Clear to auscultation bilaterally; No wheeze  HEART: Regular rate and rhythm; No murmurs, rubs, or gallops  ABDOMEN: Soft, Nontender, Nondistended; Bowel sounds present  EXTREMITIES:  Left knee swollen tender   PSYCH: AAOx3  NEUROLOGY: non-focal  SKIN: No rashes or lesions    no new labs     Consultant(s) Notes Reviewed:      Care Discussed with Consultants/Other Providers:

## 2023-12-14 NOTE — PROGRESS NOTE ADULT - SUBJECTIVE AND OBJECTIVE BOX
CC: F/U for Leukocytosis    Saw/spoke to patient. Generally well. No new complaints.    Allergies  No Known Allergies    ANTIMICROBIALS:      PE:    Vital Signs Last 24 Hrs  T(C): 37.1 (14 Dec 2023 15:48), Max: 37.1 (14 Dec 2023 15:48)  T(F): 98.8 (14 Dec 2023 15:48), Max: 98.8 (14 Dec 2023 15:48)  HR: 110 (14 Dec 2023 15:48) (102 - 110)  BP: 107/80 (14 Dec 2023 15:48) (100/76 - 137/85)  RR: 18 (14 Dec 2023 15:48) (18 - 18)  SpO2: 95% (14 Dec 2023 15:48) (95% - 98%)    Gen: AOx3, NAD, non-toxic  CV: Nontachycardic  Resp: Breathing comfortably, RA  Abd: Soft, nontender  IV/Skin: No thrombophlebitis    LABS:    MICROBIOLOGY:    Knee Synovial Fluid  12-11-23   No growth  --    polymorphonuclear leukocytes seen  No organisms seen  by cytocentrifuge    .Blood Blood  12-10-23   No growth at 72 Hours  --  --    .Blood Blood  12-10-23   No growth at 72 Hours  --  --    .Blood Blood-Peripheral  12-09-23   Growth in aerobic and anaerobic bottles: Staphylococcus epidermidis  Isolation of Coagulase negative Staphylococcus from single blood culture  sets may represent  contamination. Contact the Microbiology Department at [phone] if  susceptibilitytesting is  clinically indicated.  Direct identification is available within approximately 3-5  hours either by Blood Panel Multiplexed PCR or Direct  MALDI-TOF. Details: [email or web address]  --  Blood Culture PCR    .Blood Blood-Peripheral  12-09-23   No growth at 4 days  --  --    Rapid RVP Result: NotDetec (12-11 @ 15:08)    RADIOLOGY:    12/10 CT    IMPRESSION:    Nonspecific small knee joint effusion with mild synovitis. If there is   clinical concern for septic arthritis, knee joint aspiration should be   performed.    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    Nonspecific subcutaneous edema with skin thickening about the visualized   distal thigh, knee, and proximal calf, which may be related to lower   extremity edema or cellulitis. No drainable fluid collection seen within   the soft tissues. No soft tissue gas seen.   CC: F/U for Leukocytosis    Saw/spoke to patient. Generally well. No new complaints.    Allergies  No Known Allergies    ANTIMICROBIALS:      PE:    Vital Signs Last 24 Hrs  T(C): 37.1 (14 Dec 2023 15:48), Max: 37.1 (14 Dec 2023 15:48)  T(F): 98.8 (14 Dec 2023 15:48), Max: 98.8 (14 Dec 2023 15:48)  HR: 110 (14 Dec 2023 15:48) (102 - 110)  BP: 107/80 (14 Dec 2023 15:48) (100/76 - 137/85)  RR: 18 (14 Dec 2023 15:48) (18 - 18)  SpO2: 95% (14 Dec 2023 15:48) (95% - 98%)    Gen: AOx3, NAD, non-toxic  CV: Nontachycardic  Resp: Breathing comfortably, RA  Abd: Soft, nontender  IV/Skin: No thrombophlebitis    LABS:    MICROBIOLOGY:    Knee Synovial Fluid  12-11-23   No growth  --    polymorphonuclear leukocytes seen  No organisms seen  by cytocentrifuge    .Blood Blood  12-10-23   No growth at 72 Hours  --  --    .Blood Blood  12-10-23   No growth at 72 Hours  --  --    .Blood Blood-Peripheral  12-09-23   Growth in aerobic and anaerobic bottles: Staphylococcus epidermidis  Isolation of Coagulase negative Staphylococcus from single blood culture  sets may represent  contamination. Contact the Microbiology Department at [phone] if  susceptibilitytesting is  clinically indicated.  Direct identification is available within approximately 3-5  hours either by Blood Panel Multiplexed PCR or Direct  MALDI-TOF. Details: [email or web address]  --  Blood Culture PCR    .Blood Blood-Peripheral  12-09-23   No growth at 4 days  --  --    Rapid RVP Result: NotDetec (12-11 @ 15:08)    RADIOLOGY:    12/10 CT    IMPRESSION:    Nonspecific small knee joint effusion with mild synovitis. If there is   clinical concern for septic arthritis, knee joint aspiration should be   performed.    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    Nonspecific subcutaneous edema with skin thickening about the visualized   distal thigh, knee, and proximal calf, which may be related to lower   extremity edema or cellulitis. No drainable fluid collection seen within   the soft tissues. No soft tissue gas seen.

## 2023-12-14 NOTE — DISCHARGE NOTE PROVIDER - PROVIDER TOKENS
PROVIDER:[TOKEN:[[id number]],FOLLOWUP:[2 weeks]],PROVIDER:[TOKEN:[[id number]],FOLLOWUP:[1 week]]

## 2023-12-14 NOTE — DISCHARGE NOTE PROVIDER - HOSPITAL COURSE
Discharge Summary     Admit Date: 12-09-23  Discharge Date: 12-14-23    Admission diagnoses:   ***  Cellulitis of left lower extremity  Sirs  Septic joint  Bacteremia        Discharge diagnoses:   Cellulitis of left lower extremity  Sirs  Septic joint  Bacteremia    Hospital Course:   For full details, please see H&P, progress notes, consult notes and ancillary notes. Briefly, ADELINA CHAPMAN is a 73F morbidly obese, chronic b/l lymphedema, HTN, HLD, hypothyroidism, AFib on Inez , ambulates w/ walker at bl, admit 6/2021 for septic L knee washout . Patient admitted with Sirs, Left knee pain, Bacterremia from positive blood cultures 12/9/23. CT small joint effusion, synovitis; tricompartmental OA, edema/skin thickening.  Orthopedic consulted, patient s/p Knee aspirate and cultures not consistent with septic joint. patient has severe destructive arthritis. She is not a candidate for knee arthroplasty due to lymphedema and obesity. Strongly recommend aggreesive weight management.  IR aspiration--serosanguinous drainage, 13 WBC, 8000 RBC; BCX 2/4 Staph epi; Synovial culture no growth , BCX result contaminated? Repeat BCXs NGT    On day of discharge, patient is clinically stable with no new exam findings or acute symptoms compared to prior. The patient was seen by the attending physician on the date of discharge and deemed stable and acceptable for discharge. The patient's chronic medical conditions were treated accordingly per the patient's home medication regimen. The patient's medication reconciliation (with changes made to chronic medications), follow up appointments, discharge orders, instructions, and significant lab and diagnostic studies are as noted.     Discharge follow up action items:     1. Follow up with PCP in 1-2 weeks.   2. Follow up labs, path, & imaging ***  3. Medication changes ***  4. On hold medications ***    Patient's ordered code status: ***    Patient disposition: ***       Discharge Summary     Admit Date: 12-09-23  Discharge Date: 12-14-23    Admission diagnoses:   L knee pain    Cellulitis of left lower extremity  Sirs  Septic joint  Bacteremia        Discharge diagnoses:   Cellulitis of left lower extremity  Sirs  Septic joint  Bacteremia    Hospital Course:   For full details, please see H&P, progress notes, consult notes and ancillary notes. Briefly, ADELINA CHAPMAN is a 73F morbidly obese, chronic b/l lymphedema, HTN, HLD, hypothyroidism, AFib on Inez , ambulates w/ walker at bl, admit 6/2021 for septic L knee washout . Patient admitted with Sirs, Left knee pain, Bacterremia from positive blood cultures 12/9/23. CT small joint effusion, synovitis; tricompartmental OA, edema/skin thickening.  Orthopedic consulted, patient s/p Knee aspirate and cultures not consistent with septic joint. patient has severe destructive arthritis. She is not a candidate for knee arthroplasty due to lymphedema and obesity. Strongly recommend aggreesive weight management.  IR aspiration--serosanguinous drainage, 13 WBC, 8000 RBC; BCX 2/4 Staph epi; Synovial culture no growth , BCX result contaminated? Repeat BCXs NGT    On day of discharge, patient is clinically stable with no new exam findings or acute symptoms compared to prior. The patient was seen by the attending physician on the date of discharge and deemed stable and acceptable for discharge. The patient's chronic medical conditions were treated accordingly per the patient's home medication regimen. The patient's medication reconciliation (with changes made to chronic medications), follow up appointments, discharge orders, instructions, and significant lab and diagnostic studies are as noted.     Discharge follow up action items:     1. Follow up with PCP in 1-2 weeks.   2. Follow up labs, path, & imaging ***  3. Medication changes ***  4. On hold medications ***    Patient's ordered code status: Full Code     Patient disposition: Stable, discharge as per Dr. Rodriguez        Discharge Summary     Admit Date: 12-09-23  Discharge Date: 12-14-23    Admission diagnoses:   L knee pain    Cellulitis of left lower extremity  Sirs  Septic joint  Bacteremia        Discharge diagnoses:   Cellulitis of left lower extremity  Sirs  Septic joint  Bacteremia    Hospital Course:   For full details, please see H&P, progress notes, consult notes and ancillary notes. Briefly, ADELINA CHAPMAN is a 73F morbidly obese, chronic b/l lymphedema, HTN, HLD, hypothyroidism, AFib on Inez , ambulates w/ walker at bl, admit 6/2021 for septic L knee washout . Patient admitted with Sirs, Left knee pain, Bacterremia from positive blood cultures 12/9/23. CT small joint effusion, synovitis; tricompartmental OA, edema/skin thickening.  Orthopedic consulted, patient s/p Knee aspirate and cultures not consistent with septic joint. patient has severe destructive arthritis. She is not a candidate for knee arthroplasty due to lymphedema and obesity. Strongly recommend aggreesive weight management.  IR aspiration--serosanguinous drainage, 13 WBC, 8000 RBC; BCX 2/4 Staph epi; Synovial culture no growth , BCX result contaminated? Repeat BCXs NGT    On day of discharge, patient is clinically stable with no new exam findings or acute symptoms compared to prior. The patient was seen by the attending physician on the date of discharge and deemed stable and acceptable for discharge. The patient's chronic medical conditions were treated accordingly per the patient's home medication regimen. The patient's medication reconciliation (with changes made to chronic medications), follow up appointments, discharge orders, instructions, and significant lab and diagnostic studies are as noted.     Discharge follow up action items:     1. Follow up with PCP in 1-2 weeks.   2. Follow up labs, path, & imaging: none  3. Medication changes: Metoprolol Succinate changed to Tartrate, added Nystatin and Pantoprazole   4. On hold medications: Amlodipine and Probiotic     Patient's ordered code status: Full Code     Patient disposition: Stable, discharge as per Dr. Rodriguez

## 2023-12-14 NOTE — DISCHARGE NOTE PROVIDER - CARE PROVIDER_API CALL
Ambrose Joshua  Orthopaedic Surgery  [address] [place] [address], [address]  [place] [address]  Phone: [phone]  Fax: [phone]  Follow Up Time: 2 weeks    Topher Bender  Internal Medicine  [address] [place] [address]  Phone: [phone]  Fax: [phone]  Follow Up Time: 1 week   Ambrose Joshua  Orthopaedic Surgery  [address] [place] [address], [address]  [place] [address]  Phone: [phone]  Fax: [phone]  Follow Up Time: 2 weeks    Topher Bender  Internal Medicine  [address] [place] [address]  Phone: [phone]  Fax: [phone]  Follow Up Time: 1 week

## 2023-12-14 NOTE — DISCHARGE NOTE PROVIDER - NSDCMRMEDTOKEN_GEN_ALL_CORE_FT
amLODIPine 10 mg oral tablet: 1 tab(s) orally once a day  atorvastatin 10 mg oral tablet: 1 tab(s) orally once a day (at bedtime)  DULoxetine 60 mg oral delayed release capsule: 1 cap(s) orally once a day  Eliquis 5 mg oral tablet: 1 tab(s) orally 2 times a day  Lasix 20 mg oral tablet: 1 tab(s) orally once a day  levothyroxine 25 mcg (0.025 mg) oral tablet: 1 tab(s) orally once a day  metoprolol succinate 50 mg oral capsule, extended release: 1 cap(s) orally once a day  oxyCODONE 5 mg oral tablet: 1 tab(s) orally once a day (at bedtime)  oxyCODONE 5 mg oral tablet: 2 tab(s) orally once a day  probiotic:    acetaminophen 325 mg oral tablet: 2 tab(s) orally every 6 hours As needed Temp greater or equal to 38C (100.4F), Mild Pain (1 - 3)  apixaban 5 mg oral tablet: 1 tab(s) orally every 12 hours  atorvastatin 10 mg oral tablet: 1 tab(s) orally once a day (at bedtime)  DULoxetine 60 mg oral delayed release capsule: 1 cap(s) orally once a day  furosemide 20 mg oral tablet: 1 tab(s) orally once a day  levothyroxine 25 mcg (0.025 mg) oral tablet: 1 tab(s) orally once a day  nystatin 100,000 units/g topical powder: 1 Apply topically to affected area 2 times a day  oxyCODONE 5 mg oral tablet: 1 tab(s) orally every 6 hours As needed Severe Pain (7 - 10)  pantoprazole 40 mg oral delayed release tablet: 1 tab(s) orally once a day

## 2023-12-14 NOTE — DISCHARGE NOTE PROVIDER - NSDCCPCAREPLAN_GEN_ALL_CORE_FT
PRINCIPAL DISCHARGE DIAGNOSIS  Diagnosis: Cellulitis of left knee  Assessment and Plan of Treatment: s/p abt      SECONDARY DISCHARGE DIAGNOSES  Diagnosis: Left knee pain  Assessment and Plan of Treatment: s/p aspiration      Diagnosis: Systemic inflammatory response syndrome (SIRS)  Assessment and Plan of Treatment: resolved    Diagnosis: Septic joint of left knee joint  Assessment and Plan of Treatment:     Diagnosis: Positive blood culture  Assessment and Plan of Treatment: BCX result contam? Repeat BCXs NGTD       PRINCIPAL DISCHARGE DIAGNOSIS  Diagnosis: Cellulitis of left knee  Assessment and Plan of Treatment: s/p antibiotics,      SECONDARY DISCHARGE DIAGNOSES  Diagnosis: Systemic inflammatory response syndrome (SIRS)  Assessment and Plan of Treatment: resolved    Diagnosis: Left knee pain  Assessment and Plan of Treatment: s/p aspiration, follow up iw Prthopedics for management of Osteoarthritis       Diagnosis: Septic joint of left knee joint  Assessment and Plan of Treatment:     Diagnosis: Positive blood culture  Assessment and Plan of Treatment: BCX result contam? Repeat BCXs NGTD

## 2023-12-14 NOTE — PROGRESS NOTE ADULT - ASSESSMENT
73F morbidly obese, chronic b/l lymphedema, HTN, HLD, hypothyroidism, AFib on Inez , ambulates w/ walker at bl, admit 6/2021 for septic L knee washout w r/o L septic knee      Septic Joint   Bacteremia Contaminant   rule dout   SIRS     CT small joint effusion, synovitis; tricompartmental OA, edema/skin thickening  IR aspiration--serosanguinous drainage, 13 WBC, 8000 RBC  BCX 2/4 Staph epi,   Synovial culture no growth     Overall, Fever, leukocytosis, knee pain  - - F/U BCXs x no growth x 24 hours   Monitor off abx     ID following      PT

## 2023-12-15 VITALS — OXYGEN SATURATION: 98 % | DIASTOLIC BLOOD PRESSURE: 73 MMHG | SYSTOLIC BLOOD PRESSURE: 160 MMHG

## 2023-12-15 LAB
ANION GAP SERPL CALC-SCNC: 14 MMOL/L — SIGNIFICANT CHANGE UP (ref 5–17)
ANION GAP SERPL CALC-SCNC: 14 MMOL/L — SIGNIFICANT CHANGE UP (ref 5–17)
BUN SERPL-MCNC: 12 MG/DL — SIGNIFICANT CHANGE UP (ref 7–23)
BUN SERPL-MCNC: 12 MG/DL — SIGNIFICANT CHANGE UP (ref 7–23)
CALCIUM SERPL-MCNC: 9.6 MG/DL — SIGNIFICANT CHANGE UP (ref 8.4–10.5)
CALCIUM SERPL-MCNC: 9.6 MG/DL — SIGNIFICANT CHANGE UP (ref 8.4–10.5)
CHLORIDE SERPL-SCNC: 94 MMOL/L — LOW (ref 96–108)
CHLORIDE SERPL-SCNC: 94 MMOL/L — LOW (ref 96–108)
CO2 SERPL-SCNC: 27 MMOL/L — SIGNIFICANT CHANGE UP (ref 22–31)
CO2 SERPL-SCNC: 27 MMOL/L — SIGNIFICANT CHANGE UP (ref 22–31)
CREAT SERPL-MCNC: 0.67 MG/DL — SIGNIFICANT CHANGE UP (ref 0.5–1.3)
CREAT SERPL-MCNC: 0.67 MG/DL — SIGNIFICANT CHANGE UP (ref 0.5–1.3)
CULTURE RESULTS: SIGNIFICANT CHANGE UP
CULTURE RESULTS: SIGNIFICANT CHANGE UP
EGFR: 92 ML/MIN/1.73M2 — SIGNIFICANT CHANGE UP
EGFR: 92 ML/MIN/1.73M2 — SIGNIFICANT CHANGE UP
GLUCOSE SERPL-MCNC: 99 MG/DL — SIGNIFICANT CHANGE UP (ref 70–99)
GLUCOSE SERPL-MCNC: 99 MG/DL — SIGNIFICANT CHANGE UP (ref 70–99)
HCT VFR BLD CALC: 37.4 % — SIGNIFICANT CHANGE UP (ref 34.5–45)
HCT VFR BLD CALC: 37.4 % — SIGNIFICANT CHANGE UP (ref 34.5–45)
HGB BLD-MCNC: 11 G/DL — LOW (ref 11.5–15.5)
HGB BLD-MCNC: 11 G/DL — LOW (ref 11.5–15.5)
MCHC RBC-ENTMCNC: 24.6 PG — LOW (ref 27–34)
MCHC RBC-ENTMCNC: 24.6 PG — LOW (ref 27–34)
MCHC RBC-ENTMCNC: 29.4 GM/DL — LOW (ref 32–36)
MCHC RBC-ENTMCNC: 29.4 GM/DL — LOW (ref 32–36)
MCV RBC AUTO: 83.7 FL — SIGNIFICANT CHANGE UP (ref 80–100)
MCV RBC AUTO: 83.7 FL — SIGNIFICANT CHANGE UP (ref 80–100)
NRBC # BLD: 0 /100 WBCS — SIGNIFICANT CHANGE UP (ref 0–0)
NRBC # BLD: 0 /100 WBCS — SIGNIFICANT CHANGE UP (ref 0–0)
PLATELET # BLD AUTO: 397 K/UL — SIGNIFICANT CHANGE UP (ref 150–400)
PLATELET # BLD AUTO: 397 K/UL — SIGNIFICANT CHANGE UP (ref 150–400)
POTASSIUM SERPL-MCNC: 3.9 MMOL/L — SIGNIFICANT CHANGE UP (ref 3.5–5.3)
POTASSIUM SERPL-MCNC: 3.9 MMOL/L — SIGNIFICANT CHANGE UP (ref 3.5–5.3)
POTASSIUM SERPL-SCNC: 3.9 MMOL/L — SIGNIFICANT CHANGE UP (ref 3.5–5.3)
POTASSIUM SERPL-SCNC: 3.9 MMOL/L — SIGNIFICANT CHANGE UP (ref 3.5–5.3)
RBC # BLD: 4.47 M/UL — SIGNIFICANT CHANGE UP (ref 3.8–5.2)
RBC # BLD: 4.47 M/UL — SIGNIFICANT CHANGE UP (ref 3.8–5.2)
RBC # FLD: 16.2 % — HIGH (ref 10.3–14.5)
RBC # FLD: 16.2 % — HIGH (ref 10.3–14.5)
SODIUM SERPL-SCNC: 135 MMOL/L — SIGNIFICANT CHANGE UP (ref 135–145)
SODIUM SERPL-SCNC: 135 MMOL/L — SIGNIFICANT CHANGE UP (ref 135–145)
SPECIMEN SOURCE: SIGNIFICANT CHANGE UP
SPECIMEN SOURCE: SIGNIFICANT CHANGE UP
WBC # BLD: 11.89 K/UL — HIGH (ref 3.8–10.5)
WBC # BLD: 11.89 K/UL — HIGH (ref 3.8–10.5)
WBC # FLD AUTO: 11.89 K/UL — HIGH (ref 3.8–10.5)
WBC # FLD AUTO: 11.89 K/UL — HIGH (ref 3.8–10.5)

## 2023-12-15 PROCEDURE — 73502 X-RAY EXAM HIP UNI 2-3 VIEWS: CPT

## 2023-12-15 PROCEDURE — 86901 BLOOD TYPING SEROLOGIC RH(D): CPT

## 2023-12-15 PROCEDURE — 73701 CT LOWER EXTREMITY W/DYE: CPT

## 2023-12-15 PROCEDURE — 84100 ASSAY OF PHOSPHORUS: CPT

## 2023-12-15 PROCEDURE — 85025 COMPLETE CBC W/AUTO DIFF WBC: CPT

## 2023-12-15 PROCEDURE — 85652 RBC SED RATE AUTOMATED: CPT

## 2023-12-15 PROCEDURE — 86850 RBC ANTIBODY SCREEN: CPT

## 2023-12-15 PROCEDURE — 80202 ASSAY OF VANCOMYCIN: CPT

## 2023-12-15 PROCEDURE — 96376 TX/PRO/DX INJ SAME DRUG ADON: CPT

## 2023-12-15 PROCEDURE — 36415 COLL VENOUS BLD VENIPUNCTURE: CPT

## 2023-12-15 PROCEDURE — 85027 COMPLETE CBC AUTOMATED: CPT

## 2023-12-15 PROCEDURE — 87070 CULTURE OTHR SPECIMN AEROBIC: CPT

## 2023-12-15 PROCEDURE — 93970 EXTREMITY STUDY: CPT

## 2023-12-15 PROCEDURE — 93971 EXTREMITY STUDY: CPT

## 2023-12-15 PROCEDURE — 86900 BLOOD TYPING SEROLOGIC ABO: CPT

## 2023-12-15 PROCEDURE — 97530 THERAPEUTIC ACTIVITIES: CPT

## 2023-12-15 PROCEDURE — 80048 BASIC METABOLIC PNL TOTAL CA: CPT

## 2023-12-15 PROCEDURE — 73562 X-RAY EXAM OF KNEE 3: CPT

## 2023-12-15 PROCEDURE — 82962 GLUCOSE BLOOD TEST: CPT

## 2023-12-15 PROCEDURE — 85730 THROMBOPLASTIN TIME PARTIAL: CPT

## 2023-12-15 PROCEDURE — 96375 TX/PRO/DX INJ NEW DRUG ADDON: CPT

## 2023-12-15 PROCEDURE — 87150 DNA/RNA AMPLIFIED PROBE: CPT

## 2023-12-15 PROCEDURE — 89051 BODY FLUID CELL COUNT: CPT

## 2023-12-15 PROCEDURE — 0225U NFCT DS DNA&RNA 21 SARSCOV2: CPT

## 2023-12-15 PROCEDURE — 87077 CULTURE AEROBIC IDENTIFY: CPT

## 2023-12-15 PROCEDURE — 20611 DRAIN/INJ JOINT/BURSA W/US: CPT

## 2023-12-15 PROCEDURE — 87640 STAPH A DNA AMP PROBE: CPT

## 2023-12-15 PROCEDURE — 85610 PROTHROMBIN TIME: CPT

## 2023-12-15 PROCEDURE — 97110 THERAPEUTIC EXERCISES: CPT

## 2023-12-15 PROCEDURE — 87040 BLOOD CULTURE FOR BACTERIA: CPT

## 2023-12-15 PROCEDURE — 87075 CULTR BACTERIA EXCEPT BLOOD: CPT

## 2023-12-15 PROCEDURE — 86803 HEPATITIS C AB TEST: CPT

## 2023-12-15 PROCEDURE — 99285 EMERGENCY DEPT VISIT HI MDM: CPT | Mod: 25

## 2023-12-15 PROCEDURE — 87641 MR-STAPH DNA AMP PROBE: CPT

## 2023-12-15 PROCEDURE — 96374 THER/PROPH/DIAG INJ IV PUSH: CPT

## 2023-12-15 PROCEDURE — 80053 COMPREHEN METABOLIC PANEL: CPT

## 2023-12-15 PROCEDURE — 97162 PT EVAL MOD COMPLEX 30 MIN: CPT

## 2023-12-15 PROCEDURE — 87205 SMEAR GRAM STAIN: CPT

## 2023-12-15 PROCEDURE — 83735 ASSAY OF MAGNESIUM: CPT

## 2023-12-15 PROCEDURE — 86140 C-REACTIVE PROTEIN: CPT

## 2023-12-15 RX ORDER — DULOXETINE HYDROCHLORIDE 30 MG/1
1 CAPSULE, DELAYED RELEASE ORAL
Qty: 0 | Refills: 0 | DISCHARGE
Start: 2023-12-15

## 2023-12-15 RX ORDER — ACETAMINOPHEN 500 MG
2 TABLET ORAL
Qty: 0 | Refills: 0 | DISCHARGE
Start: 2023-12-15

## 2023-12-15 RX ORDER — AMLODIPINE BESYLATE 2.5 MG/1
1 TABLET ORAL
Refills: 0 | DISCHARGE

## 2023-12-15 RX ORDER — OXYCODONE HYDROCHLORIDE 5 MG/1
1 TABLET ORAL
Refills: 0 | DISCHARGE

## 2023-12-15 RX ORDER — METOPROLOL TARTRATE 50 MG
1 TABLET ORAL
Refills: 0 | DISCHARGE

## 2023-12-15 RX ORDER — FUROSEMIDE 40 MG
1 TABLET ORAL
Refills: 0 | DISCHARGE

## 2023-12-15 RX ORDER — NYSTATIN CREAM 100000 [USP'U]/G
1 CREAM TOPICAL
Qty: 0 | Refills: 0 | DISCHARGE
Start: 2023-12-15

## 2023-12-15 RX ORDER — APIXABAN 2.5 MG/1
1 TABLET, FILM COATED ORAL
Refills: 0 | DISCHARGE

## 2023-12-15 RX ORDER — METOPROLOL TARTRATE 50 MG
0.5 TABLET ORAL
Qty: 30 | Refills: 0
Start: 2023-12-15 | End: 2024-01-13

## 2023-12-15 RX ORDER — ATORVASTATIN CALCIUM 80 MG/1
1 TABLET, FILM COATED ORAL
Qty: 0 | Refills: 0 | DISCHARGE
Start: 2023-12-15

## 2023-12-15 RX ORDER — OXYCODONE HYDROCHLORIDE 5 MG/1
1 TABLET ORAL
Qty: 0 | Refills: 0 | DISCHARGE
Start: 2023-12-15

## 2023-12-15 RX ORDER — APIXABAN 2.5 MG/1
1 TABLET, FILM COATED ORAL
Qty: 0 | Refills: 0 | DISCHARGE
Start: 2023-12-15

## 2023-12-15 RX ORDER — LEVOTHYROXINE SODIUM 125 MCG
1 TABLET ORAL
Qty: 0 | Refills: 0 | DISCHARGE
Start: 2023-12-15

## 2023-12-15 RX ORDER — OXYCODONE HYDROCHLORIDE 5 MG/1
2 TABLET ORAL
Refills: 0 | DISCHARGE

## 2023-12-15 RX ORDER — PANTOPRAZOLE SODIUM 20 MG/1
1 TABLET, DELAYED RELEASE ORAL
Qty: 0 | Refills: 0 | DISCHARGE
Start: 2023-12-15

## 2023-12-15 RX ORDER — FUROSEMIDE 40 MG
1 TABLET ORAL
Qty: 0 | Refills: 0 | DISCHARGE
Start: 2023-12-15

## 2023-12-15 RX ADMIN — DULOXETINE HYDROCHLORIDE 60 MILLIGRAM(S): 30 CAPSULE, DELAYED RELEASE ORAL at 11:50

## 2023-12-15 RX ADMIN — PANTOPRAZOLE SODIUM 40 MILLIGRAM(S): 20 TABLET, DELAYED RELEASE ORAL at 11:50

## 2023-12-15 RX ADMIN — Medication 25 MICROGRAM(S): at 05:27

## 2023-12-15 RX ADMIN — OXYCODONE HYDROCHLORIDE 5 MILLIGRAM(S): 5 TABLET ORAL at 05:55

## 2023-12-15 RX ADMIN — Medication 20 MILLIGRAM(S): at 05:27

## 2023-12-15 RX ADMIN — CHLORHEXIDINE GLUCONATE 1 APPLICATION(S): 213 SOLUTION TOPICAL at 05:28

## 2023-12-15 RX ADMIN — Medication 12.5 MILLIGRAM(S): at 05:27

## 2023-12-15 RX ADMIN — APIXABAN 5 MILLIGRAM(S): 2.5 TABLET, FILM COATED ORAL at 05:28

## 2023-12-15 RX ADMIN — NYSTATIN CREAM 1 APPLICATION(S): 100000 CREAM TOPICAL at 05:28

## 2023-12-15 NOTE — DISCHARGE NOTE NURSING/CASE MANAGEMENT/SOCIAL WORK - NSDCPEFALRISK_GEN_ALL_CORE
For information on Fall & Injury Prevention, visit: [email or web address] OR  [email or web address] OR  https://www.cdc.gov/steadi/patient.html For information on Fall & Injury Prevention, visit: [email or web address] OR  [email or web address] OR  https://www.cdc.gov/steadi/patient.html

## 2023-12-15 NOTE — DISCHARGE NOTE NURSING/CASE MANAGEMENT/SOCIAL WORK - PATIENT PORTAL LINK FT
You can access the FollowMyHealth Patient Portal offered by [facility] by registering at the following website: [email or web address]. By joining [employer]’s FollowMyHealth portal, you will also be able to view your health information using other applications (apps) compatible with our system. You can access the FollowMyHealth Patient Portal offered by [facility] by registering at the following website: [email or web address]. By joining [facility]’s FollowMyHealth portal, you will also be able to view your health information using other applications (apps) compatible with our system.

## 2024-09-25 NOTE — PRE-OP CHECKLIST - AS BP NONINV METHOD
Quality 431: Preventive Care And Screening: Unhealthy Alcohol Use - Screening: Patient not identified as an unhealthy alcohol user when screened for unhealthy alcohol use using a systematic screening method Quality 110: Preventive Care And Screening: Influenza Immunization: Influenza Immunization Administered during Influenza season Quality 402: Tobacco Use And Help With Quitting Among Adolescents: Patient screened for tobacco and never smoked electronic Detail Level: Detailed Quality 130: Documentation Of Current Medications In The Medical Record: Current Medications Documented Quality 47: Advance Care Plan: Advance care planning not documented, reason not otherwise specified. Quality 226: Preventive Care And Screening: Tobacco Use: Screening And Cessation Intervention: Patient screened for tobacco use and is an ex/non-smoker